# Patient Record
Sex: FEMALE | Race: BLACK OR AFRICAN AMERICAN | NOT HISPANIC OR LATINO | Employment: UNEMPLOYED | URBAN - METROPOLITAN AREA
[De-identification: names, ages, dates, MRNs, and addresses within clinical notes are randomized per-mention and may not be internally consistent; named-entity substitution may affect disease eponyms.]

---

## 2017-01-23 ENCOUNTER — HISTORICAL (OUTPATIENT)
Dept: INTERNAL MEDICINE | Facility: CLINIC | Age: 37
End: 2017-01-23

## 2017-05-12 ENCOUNTER — HISTORICAL (OUTPATIENT)
Dept: OTOLARYNGOLOGY | Facility: CLINIC | Age: 37
End: 2017-05-12

## 2017-06-23 ENCOUNTER — HISTORICAL (OUTPATIENT)
Dept: ADMINISTRATIVE | Facility: HOSPITAL | Age: 37
End: 2017-06-23

## 2017-06-23 LAB
BUN SERPL-MCNC: 8 MG/DL (ref 7–25)
CALCIUM SERPL-MCNC: 8.5 MG/DL (ref 8.4–10.3)
CHLORIDE SERPL-SCNC: 105 MMOL/L (ref 96–110)
CO2 SERPL-SCNC: 27 MMOL/L (ref 24–32)
CREAT SERPL-MCNC: 0.51 MG/DL (ref 0.7–1.1)
ERYTHROCYTE [DISTWIDTH] IN BLOOD BY AUTOMATED COUNT: 15.9 % (ref 11.5–14.5)
GLUCOSE SERPL-MCNC: 79 MG/DL (ref 65–99)
HCT VFR BLD AUTO: 35.6 % (ref 35–46)
HGB BLD-MCNC: 11.9 GM/DL (ref 12–16)
MCH RBC QN AUTO: 24.7 PG (ref 26–34)
MCHC RBC AUTO-ENTMCNC: 33.4 GM/DL (ref 31–37)
MCV RBC AUTO: 74 FL (ref 80–100)
PLATELET # BLD AUTO: 281 X10(3)/MCL (ref 130–400)
PMV BLD AUTO: 12.1 FL (ref 7.4–10.4)
POTASSIUM SERPL-SCNC: 3.7 MMOL/L (ref 3.6–5.2)
RBC # BLD AUTO: 4.81 X10(6)/MCL (ref 4–5.2)
SODIUM SERPL-SCNC: 140 MMOL/L (ref 135–146)
WBC # SPEC AUTO: 6.8 X10(3)/MCL (ref 4.5–11)

## 2017-06-30 ENCOUNTER — HISTORICAL (OUTPATIENT)
Dept: SURGERY | Facility: HOSPITAL | Age: 37
End: 2017-06-30

## 2017-06-30 LAB — B-HCG SERPL QL: NEGATIVE

## 2017-07-21 ENCOUNTER — HISTORICAL (OUTPATIENT)
Dept: ADMINISTRATIVE | Facility: HOSPITAL | Age: 37
End: 2017-07-21

## 2017-07-21 LAB
BUN SERPL-MCNC: 8 MG/DL (ref 7–18)
CALCIUM SERPL-MCNC: 8.5 MG/DL (ref 8.5–10.1)
CHLORIDE SERPL-SCNC: 104 MMOL/L (ref 98–107)
CO2 SERPL-SCNC: 28 MMOL/L (ref 21–32)
CREAT SERPL-MCNC: 0.6 MG/DL (ref 0.6–1.3)
ERYTHROCYTE [DISTWIDTH] IN BLOOD BY AUTOMATED COUNT: 16.3 % (ref 11.5–14.5)
GLUCOSE SERPL-MCNC: 79 MG/DL (ref 74–106)
HCT VFR BLD AUTO: 36.7 % (ref 35–46)
HGB BLD-MCNC: 12.5 GM/DL (ref 12–16)
MCH RBC QN AUTO: 25.1 PG (ref 26–34)
MCHC RBC AUTO-ENTMCNC: 34.1 GM/DL (ref 31–37)
MCV RBC AUTO: 73.7 FL (ref 80–100)
PLATELET # BLD AUTO: 268 X10(3)/MCL (ref 130–400)
PMV BLD AUTO: 11.1 FL (ref 7.4–10.4)
POTASSIUM SERPL-SCNC: 3.9 MMOL/L (ref 3.5–5.1)
RBC # BLD AUTO: 4.98 X10(6)/MCL (ref 4–5.2)
SODIUM SERPL-SCNC: 140 MMOL/L (ref 136–145)
WBC # SPEC AUTO: 8.2 X10(3)/MCL (ref 4.5–11)

## 2017-07-24 ENCOUNTER — HISTORICAL (OUTPATIENT)
Dept: SURGERY | Facility: HOSPITAL | Age: 37
End: 2017-07-24

## 2017-07-24 LAB — B-HCG SERPL QL: NEGATIVE

## 2017-08-04 ENCOUNTER — HISTORICAL (OUTPATIENT)
Dept: ADMINISTRATIVE | Facility: HOSPITAL | Age: 37
End: 2017-08-04

## 2017-08-11 ENCOUNTER — HOSPITAL ENCOUNTER (OUTPATIENT)
Dept: MEDSURG UNIT | Facility: HOSPITAL | Age: 37
End: 2017-08-12
Attending: OTOLARYNGOLOGY | Admitting: OTOLARYNGOLOGY

## 2017-08-11 LAB
B-HCG SERPL QL: NEGATIVE
POTASSIUM SERPL-SCNC: 3.7 MMOL/L (ref 3.5–5.1)

## 2018-09-01 ENCOUNTER — HISTORICAL (OUTPATIENT)
Dept: ADMINISTRATIVE | Facility: HOSPITAL | Age: 38
End: 2018-09-01

## 2018-09-01 LAB
ABS NEUT (OLG): 5.17 X10(3)/MCL (ref 2.1–9.2)
BASOPHILS # BLD AUTO: 0.03 X10(3)/MCL
BASOPHILS NFR BLD AUTO: 0 %
BUN SERPL-MCNC: 6 MG/DL (ref 7–18)
CALCIUM SERPL-MCNC: 7.7 MG/DL (ref 8.5–10.1)
CHLORIDE SERPL-SCNC: 106 MMOL/L (ref 98–107)
CO2 SERPL-SCNC: 28 MMOL/L (ref 21–32)
CREAT SERPL-MCNC: 0.6 MG/DL (ref 0.6–1.3)
CREAT/UREA NIT SERPL: 10
EOSINOPHIL # BLD AUTO: 0.03 X10(3)/MCL
EOSINOPHIL NFR BLD AUTO: 0 %
ERYTHROCYTE [DISTWIDTH] IN BLOOD BY AUTOMATED COUNT: 16.3 % (ref 11.5–14.5)
GLUCOSE SERPL-MCNC: 87 MG/DL (ref 74–106)
HCT VFR BLD AUTO: 30.6 % (ref 35–46)
HGB BLD-MCNC: 9.9 GM/DL (ref 12–16)
IMM GRANULOCYTES # BLD AUTO: 0.02 10*3/UL
IMM GRANULOCYTES NFR BLD AUTO: 0 %
LYMPHOCYTES # BLD AUTO: 1.8 X10(3)/MCL
LYMPHOCYTES NFR BLD AUTO: 23 % (ref 13–40)
MCH RBC QN AUTO: 22.6 PG (ref 26–34)
MCHC RBC AUTO-ENTMCNC: 32.4 GM/DL (ref 31–37)
MCV RBC AUTO: 69.7 FL (ref 80–100)
MONOCYTES # BLD AUTO: 0.66 X10(3)/MCL
MONOCYTES NFR BLD AUTO: 9 % (ref 4–12)
NEUTROPHILS # BLD AUTO: 5.17 X10(3)/MCL
NEUTROPHILS NFR BLD AUTO: 67 X10(3)/MCL
PLATELET # BLD AUTO: 316 X10(3)/MCL (ref 130–400)
PMV BLD AUTO: 10.1 FL (ref 7.4–10.4)
POTASSIUM SERPL-SCNC: 3.9 MMOL/L (ref 3.5–5.1)
RBC # BLD AUTO: 4.39 X10(6)/MCL (ref 4–5.2)
SODIUM SERPL-SCNC: 140 MMOL/L (ref 136–145)
T3FREE SERPL-MCNC: 2.51 PG/ML (ref 2.18–3.98)
T4 FREE SERPL-MCNC: 1.07 NG/DL (ref 0.76–1.46)
TSH SERPL-ACNC: 0.71 MIU/L (ref 0.36–3.74)
WBC # SPEC AUTO: 7.7 X10(3)/MCL (ref 4.5–11)

## 2018-10-01 ENCOUNTER — HISTORICAL (OUTPATIENT)
Dept: RADIOLOGY | Facility: HOSPITAL | Age: 38
End: 2018-10-01

## 2018-10-25 ENCOUNTER — HISTORICAL (OUTPATIENT)
Dept: RADIOLOGY | Facility: HOSPITAL | Age: 38
End: 2018-10-25

## 2018-10-25 LAB
ABS NEUT (OLG): 5.19 X10(3)/MCL (ref 2.1–9.2)
ALBUMIN SERPL-MCNC: 3.3 GM/DL (ref 3.4–5)
ALBUMIN/GLOB SERPL: 0.7 RATIO (ref 1.1–2)
ALP SERPL-CCNC: 64 UNIT/L (ref 46–116)
ALT SERPL-CCNC: 14 UNIT/L (ref 12–78)
ANISOCYTOSIS BLD QL SMEAR: NORMAL
AST SERPL-CCNC: 11 UNIT/L (ref 15–37)
BASOPHILS # BLD AUTO: 0 X10(3)/MCL (ref 0–0.2)
BASOPHILS NFR BLD AUTO: 0 %
BILIRUB SERPL-MCNC: 0.3 MG/DL (ref 0.2–1)
BILIRUBIN DIRECT+TOT PNL SERPL-MCNC: 0.1 MG/DL (ref 0–0.2)
BILIRUBIN DIRECT+TOT PNL SERPL-MCNC: 0.2 MG/DL (ref 0–0.8)
BUN SERPL-MCNC: 9.5 MG/DL (ref 7–18)
CALCIUM SERPL-MCNC: 8.4 MG/DL (ref 8.5–10.1)
CHLORIDE SERPL-SCNC: 105 MMOL/L (ref 98–107)
CO2 SERPL-SCNC: 25.7 MMOL/L (ref 21–32)
CREAT SERPL-MCNC: 0.75 MG/DL (ref 0.6–1.3)
EOSINOPHIL # BLD AUTO: 0.1 X10(3)/MCL (ref 0–0.9)
EOSINOPHIL NFR BLD AUTO: 1 %
ERYTHROCYTE [DISTWIDTH] IN BLOOD BY AUTOMATED COUNT: 17.9 % (ref 11.5–17)
FERRITIN SERPL-MCNC: 7 NG/ML (ref 8–388)
GLOBULIN SER-MCNC: 4.6 GM/DL (ref 2.4–3.5)
GLUCOSE SERPL-MCNC: 99 MG/DL (ref 74–106)
HCT VFR BLD AUTO: 34.7 % (ref 37–47)
HGB BLD-MCNC: 10.9 GM/DL (ref 12–16)
IMM GRANULOCYTES # BLD AUTO: 0.02 % (ref 0–0.02)
IMM GRANULOCYTES NFR BLD AUTO: 0.2 % (ref 0–0.43)
IRON SATN MFR SERPL: 6.8 % (ref 20–50)
IRON SERPL-MCNC: 29 MCG/DL (ref 50–175)
LYMPHOCYTES # BLD AUTO: 2 X10(3)/MCL (ref 0.6–4.6)
LYMPHOCYTES NFR BLD AUTO: 24 %
MCH RBC QN AUTO: 21.8 PG (ref 27–31)
MCHC RBC AUTO-ENTMCNC: 31.4 GM/DL (ref 33–36)
MCV RBC AUTO: 69.5 FL (ref 80–94)
MICROCYTES BLD QL SMEAR: NORMAL
MONOCYTES # BLD AUTO: 1 X10(3)/MCL (ref 0.1–1.3)
MONOCYTES NFR BLD AUTO: 12 %
NEUTROPHILS # BLD AUTO: 5.19 X10(3)/MCL (ref 1.4–7.9)
NEUTROPHILS NFR BLD AUTO: 62 %
PLATELET # BLD AUTO: 304 X10(3)/MCL (ref 130–400)
PLATELET # BLD EST: ADEQUATE 10*3/UL
PMV BLD AUTO: 9.6 FL (ref 9.4–12.4)
POTASSIUM SERPL-SCNC: 4.4 MMOL/L (ref 3.5–5.1)
PROT SERPL-MCNC: 7.9 GM/DL (ref 6.4–8.2)
RBC # BLD AUTO: 4.99 X10(6)/MCL (ref 4.2–5.4)
SODIUM SERPL-SCNC: 139 MMOL/L (ref 136–145)
TIBC SERPL-MCNC: 429 MCG/DL (ref 250–450)
TRANSFERRIN SERPL-MCNC: 317 MG/DL (ref 200–360)
WBC # SPEC AUTO: 8.4 X10(3)/MCL (ref 4.5–11.5)

## 2019-03-07 ENCOUNTER — HISTORICAL (OUTPATIENT)
Dept: ADMINISTRATIVE | Facility: HOSPITAL | Age: 39
End: 2019-03-07

## 2019-03-07 LAB
ALBUMIN SERPL-MCNC: 3.8 GM/DL (ref 3.4–5)
ALBUMIN/GLOB SERPL: 0.8 RATIO (ref 1.1–2)
ALP SERPL-CCNC: 83 UNIT/L (ref 45–117)
ALT SERPL-CCNC: 18 UNIT/L (ref 12–78)
ANISOCYTOSIS BLD QL SMEAR: ABNORMAL
AST SERPL-CCNC: 18 UNIT/L (ref 15–37)
BASOPHILS NFR BLD MANUAL: 0 %
BILIRUB SERPL-MCNC: 0.4 MG/DL (ref 0.2–1)
BILIRUBIN DIRECT+TOT PNL SERPL-MCNC: 0.1 MG/DL
BILIRUBIN DIRECT+TOT PNL SERPL-MCNC: 0.3 MG/DL
BUN SERPL-MCNC: 8 MG/DL (ref 7–18)
CALCIUM SERPL-MCNC: 8.6 MG/DL (ref 8.5–10.1)
CHLORIDE SERPL-SCNC: 109 MMOL/L (ref 98–107)
CO2 SERPL-SCNC: 22 MMOL/L (ref 21–32)
CREAT SERPL-MCNC: 0.9 MG/DL (ref 0.6–1.3)
DEPRECATED CALCIDIOL+CALCIFEROL SERPL-MC: 9.17 NG/ML (ref 30–80)
EOSINOPHIL NFR BLD MANUAL: 0 %
ERYTHROCYTE [DISTWIDTH] IN BLOOD BY AUTOMATED COUNT: 18.9 % (ref 11.5–14.5)
FERRITIN SERPL-MCNC: 9.4 NG/ML (ref 10–150)
GLOBULIN SER-MCNC: 4.9 GM/ML (ref 2.3–3.5)
GLUCOSE SERPL-MCNC: 80 MG/DL (ref 74–106)
GRANULOCYTES NFR BLD MANUAL: 69 % (ref 43–75)
HCT VFR BLD AUTO: 36.6 % (ref 35–46)
HGB BLD-MCNC: 11.3 GM/DL (ref 12–16)
IRON SATN MFR SERPL: 8 % (ref 15–50)
IRON SERPL-MCNC: 34 MCG/DL (ref 50–170)
LYMPHOCYTES NFR BLD MANUAL: 1 %
LYMPHOCYTES NFR BLD MANUAL: 18 % (ref 20.5–51.1)
MCH RBC QN AUTO: 21.3 PG (ref 26–34)
MCHC RBC AUTO-ENTMCNC: 30.9 GM/DL (ref 31–37)
MCV RBC AUTO: 68.9 FL (ref 80–100)
MICROCYTES BLD QL SMEAR: ABNORMAL
MONOCYTES NFR BLD MANUAL: 11 % (ref 2–9)
NEUTS BAND NFR BLD MANUAL: 1 % (ref 0–10)
PLATELET # BLD AUTO: 300 X10(3)/MCL (ref 130–400)
PLATELET # BLD EST: ADEQUATE 10*3/UL
PMV BLD AUTO: 10.3 FL (ref 7.4–10.4)
POLYCHROMASIA BLD QL SMEAR: ABNORMAL
POTASSIUM SERPL-SCNC: 3.6 MMOL/L (ref 3.5–5.1)
PROT SERPL-MCNC: 8.7 GM/DL (ref 6.4–8.2)
RBC # BLD AUTO: 5.31 X10(6)/MCL (ref 4–5.2)
RBC MORPH BLD: ABNORMAL
SODIUM SERPL-SCNC: 136 MMOL/L (ref 136–145)
SPHEROCYTES BLD QL SMEAR: ABNORMAL
TIBC SERPL-MCNC: 426 MCG/DL (ref 250–450)
TRANSFERRIN SERPL-MCNC: 331 MG/DL (ref 200–360)
WBC # SPEC AUTO: 5.8 X10(3)/MCL (ref 4.5–11)

## 2019-03-15 ENCOUNTER — HISTORICAL (OUTPATIENT)
Dept: RADIOLOGY | Facility: HOSPITAL | Age: 39
End: 2019-03-15

## 2019-04-15 ENCOUNTER — HISTORICAL (OUTPATIENT)
Dept: ENDOSCOPY | Facility: HOSPITAL | Age: 39
End: 2019-04-15

## 2019-04-15 LAB — B-HCG SERPL QL: NEGATIVE

## 2020-03-13 ENCOUNTER — HISTORICAL (OUTPATIENT)
Dept: RADIOLOGY | Facility: HOSPITAL | Age: 40
End: 2020-03-13

## 2020-09-21 ENCOUNTER — HISTORICAL (OUTPATIENT)
Dept: LAB | Facility: HOSPITAL | Age: 40
End: 2020-09-21

## 2020-09-21 LAB
ABS NEUT (OLG): 6.64 X10(3)/MCL (ref 2.1–9.2)
ALBUMIN SERPL-MCNC: 3.5 GM/DL (ref 3.4–5)
ALBUMIN/GLOB SERPL: 0.7 RATIO (ref 1.1–2)
ALP SERPL-CCNC: 68 UNIT/L (ref 46–116)
ALT SERPL-CCNC: 19 UNIT/L (ref 12–78)
AST SERPL-CCNC: 12 UNIT/L (ref 15–37)
BASOPHILS # BLD AUTO: 0 X10(3)/MCL (ref 0–0.2)
BASOPHILS NFR BLD AUTO: 0 %
BILIRUB SERPL-MCNC: 0.3 MG/DL (ref 0.2–1)
BILIRUBIN DIRECT+TOT PNL SERPL-MCNC: 0.1 MG/DL (ref 0–0.2)
BILIRUBIN DIRECT+TOT PNL SERPL-MCNC: 0.2 MG/DL (ref 0–0.8)
BUN SERPL-MCNC: 12.2 MG/DL (ref 7–18)
CALCIUM SERPL-MCNC: 9.1 MG/DL (ref 8.5–10.1)
CHLORIDE SERPL-SCNC: 104 MMOL/L (ref 98–107)
CHOLEST SERPL-MCNC: 199 MG/DL (ref 0–200)
CHOLEST/HDLC SERPL: 4.1 {RATIO} (ref 0–4)
CO2 SERPL-SCNC: 25.8 MMOL/L (ref 21–32)
CREAT SERPL-MCNC: 0.8 MG/DL (ref 0.6–1.3)
DEPRECATED CALCIDIOL+CALCIFEROL SERPL-MC: 25.1 NG/ML (ref 6.6–49.9)
EOSINOPHIL # BLD AUTO: 0 X10(3)/MCL (ref 0–0.9)
EOSINOPHIL NFR BLD AUTO: 0 %
ERYTHROCYTE [DISTWIDTH] IN BLOOD BY AUTOMATED COUNT: 17.5 % (ref 11.5–17)
GLOBULIN SER-MCNC: 4.8 GM/DL (ref 2.4–3.5)
GLUCOSE SERPL-MCNC: 99 MG/DL (ref 74–106)
HCT VFR BLD AUTO: 31.6 % (ref 37–47)
HDLC SERPL-MCNC: 49 MG/DL (ref 40–60)
HGB BLD-MCNC: 10.3 GM/DL (ref 12–16)
LDLC SERPL CALC-MCNC: 124 MG/DL (ref 0–129)
LYMPHOCYTES # BLD AUTO: 1.9 X10(3)/MCL (ref 0.6–4.6)
LYMPHOCYTES NFR BLD AUTO: 20 %
MCH RBC QN AUTO: 22.4 PG (ref 27–31)
MCHC RBC AUTO-ENTMCNC: 32.6 GM/DL (ref 33–36)
MCV RBC AUTO: 68.7 FL (ref 80–94)
MONOCYTES # BLD AUTO: 0.9 X10(3)/MCL (ref 0.1–1.3)
MONOCYTES NFR BLD AUTO: 9 %
NEUTROPHILS # BLD AUTO: 6.64 X10(3)/MCL (ref 1.4–7.9)
NEUTROPHILS NFR BLD AUTO: 70 %
PLATELET # BLD AUTO: 335 X10(3)/MCL (ref 130–400)
PMV BLD AUTO: 10.1 FL (ref 9.4–12.4)
POTASSIUM SERPL-SCNC: 4 MMOL/L (ref 3.5–5.1)
PROT SERPL-MCNC: 8.3 GM/DL (ref 6.4–8.2)
RBC # BLD AUTO: 4.6 X10(6)/MCL (ref 4.2–5.4)
SODIUM SERPL-SCNC: 141 MMOL/L (ref 136–145)
TRIGL SERPL-MCNC: 129 MG/DL
TSH SERPL-ACNC: 1.43 MIU/ML (ref 0.36–3.74)
VLDLC SERPL CALC-MCNC: 26 MG/DL
WBC # SPEC AUTO: 9.5 X10(3)/MCL (ref 4.5–11.5)

## 2020-10-30 ENCOUNTER — HISTORICAL (OUTPATIENT)
Dept: RADIOLOGY | Facility: HOSPITAL | Age: 40
End: 2020-10-30

## 2021-03-24 ENCOUNTER — HISTORICAL (OUTPATIENT)
Dept: ADMINISTRATIVE | Facility: HOSPITAL | Age: 41
End: 2021-03-24

## 2021-03-24 LAB
ABS NEUT (OLG): 6.44 X10(3)/MCL (ref 2.1–9.2)
APPEARANCE, UA: CLEAR
BACTERIA #/AREA URNS AUTO: ABNORMAL /HPF
BASOPHILS # BLD AUTO: 0 X10(3)/MCL (ref 0–0.2)
BASOPHILS NFR BLD AUTO: 0 %
BILIRUB UR QL STRIP: NEGATIVE
COLOR UR: NORMAL
EOSINOPHIL # BLD AUTO: 0 X10(3)/MCL (ref 0–0.9)
EOSINOPHIL NFR BLD AUTO: 0 %
ERYTHROCYTE [DISTWIDTH] IN BLOOD BY AUTOMATED COUNT: 17.3 % (ref 11.5–14.5)
GLUCOSE (UA): NEGATIVE
HAV IGM SERPL QL IA: NONREACTIVE
HBV CORE IGM SERPL QL IA: NONREACTIVE
HBV SURFACE AG SERPL QL IA: NONREACTIVE
HCT VFR BLD AUTO: 35.1 % (ref 35–46)
HCV AB SERPL QL IA: NONREACTIVE
HGB BLD-MCNC: 10.7 GM/DL (ref 12–16)
HGB UR QL STRIP: NEGATIVE
HIV 1+2 AB+HIV1 P24 AG SERPL QL IA: NONREACTIVE
HYALINE CASTS #/AREA URNS LPF: ABNORMAL /LPF
IMM GRANULOCYTES # BLD AUTO: 0.02 10*3/UL
IMM GRANULOCYTES NFR BLD AUTO: 0 %
KETONES UR QL STRIP: NEGATIVE
LEUKOCYTE ESTERASE UR QL STRIP: NEGATIVE
LYMPHOCYTES # BLD AUTO: 2.6 X10(3)/MCL (ref 0.6–4.6)
LYMPHOCYTES NFR BLD AUTO: 26 %
MCH RBC QN AUTO: 20.7 PG (ref 26–34)
MCHC RBC AUTO-ENTMCNC: 30.5 GM/DL (ref 31–37)
MCV RBC AUTO: 68 FL (ref 80–100)
MONOCYTES # BLD AUTO: 0.8 X10(3)/MCL (ref 0.1–1.3)
MONOCYTES NFR BLD AUTO: 8 %
NEUTROPHILS # BLD AUTO: 6.44 X10(3)/MCL (ref 2.1–9.2)
NEUTROPHILS NFR BLD AUTO: 65 %
NITRITE UR QL STRIP: NEGATIVE
PH UR STRIP: 7 [PH] (ref 4.5–8)
PLATELET # BLD AUTO: 389 X10(3)/MCL (ref 130–400)
PMV BLD AUTO: 10.2 FL (ref 7.4–10.4)
POC BETA-HCG (QUAL): NEGATIVE
PROT UR QL STRIP: NEGATIVE
RBC # BLD AUTO: 5.16 X10(6)/MCL (ref 4–5.2)
RBC #/AREA URNS AUTO: ABNORMAL /HPF
SP GR UR STRIP: 1.01 (ref 1–1.03)
SQUAMOUS #/AREA URNS LPF: ABNORMAL /LPF
T PALLIDUM AB SER QL: NONREACTIVE
UROBILINOGEN UR STRIP-ACNC: NORMAL
WBC # SPEC AUTO: 9.8 X10(3)/MCL (ref 4.5–11)
WBC #/AREA URNS AUTO: ABNORMAL /HPF

## 2021-04-22 ENCOUNTER — HISTORICAL (OUTPATIENT)
Dept: RADIOLOGY | Facility: HOSPITAL | Age: 41
End: 2021-04-22

## 2021-06-08 ENCOUNTER — HISTORICAL (OUTPATIENT)
Dept: RADIOLOGY | Facility: HOSPITAL | Age: 41
End: 2021-06-08

## 2021-07-16 ENCOUNTER — HISTORICAL (OUTPATIENT)
Dept: ADMINISTRATIVE | Facility: HOSPITAL | Age: 41
End: 2021-07-16

## 2021-07-22 ENCOUNTER — HISTORICAL (OUTPATIENT)
Dept: SURGERY | Facility: HOSPITAL | Age: 41
End: 2021-07-22

## 2021-08-18 ENCOUNTER — HISTORICAL (OUTPATIENT)
Dept: RADIOLOGY | Facility: HOSPITAL | Age: 41
End: 2021-08-18

## 2021-09-08 ENCOUNTER — HISTORICAL (OUTPATIENT)
Dept: LAB | Facility: HOSPITAL | Age: 41
End: 2021-09-08

## 2021-09-08 LAB
ABS NEUT (OLG): 1.88 X10(3)/MCL (ref 2.1–9.2)
ALBUMIN SERPL-MCNC: 3.7 GM/DL (ref 3.5–5)
ALBUMIN/GLOB SERPL: 0.8 RATIO (ref 1.1–2)
ALP SERPL-CCNC: 75 UNIT/L (ref 40–150)
ALT SERPL-CCNC: 15 UNIT/L (ref 0–55)
AST SERPL-CCNC: 18 UNIT/L (ref 5–34)
BASOPHILS # BLD AUTO: 0 X10(3)/MCL (ref 0–0.2)
BASOPHILS NFR BLD AUTO: 0 %
BILIRUB SERPL-MCNC: 0.4 MG/DL
BILIRUBIN DIRECT+TOT PNL SERPL-MCNC: 0.2 MG/DL (ref 0–0.5)
BILIRUBIN DIRECT+TOT PNL SERPL-MCNC: 0.2 MG/DL (ref 0–0.8)
BUN SERPL-MCNC: 10.4 MG/DL (ref 7–18.7)
CALCIUM SERPL-MCNC: 9 MG/DL (ref 8.4–10.2)
CHLORIDE SERPL-SCNC: 104 MMOL/L (ref 98–107)
CHOLEST SERPL-MCNC: 132 MG/DL
CHOLEST/HDLC SERPL: 4 {RATIO} (ref 0–5)
CO2 SERPL-SCNC: 25 MMOL/L (ref 22–29)
CREAT SERPL-MCNC: 0.74 MG/DL (ref 0.55–1.02)
DEPRECATED CALCIDIOL+CALCIFEROL SERPL-MC: 27.2 NG/ML (ref 30–80)
ERYTHROCYTE [DISTWIDTH] IN BLOOD BY AUTOMATED COUNT: 16.2 % (ref 11.5–14.5)
EST. AVERAGE GLUCOSE BLD GHB EST-MCNC: 119.8 MG/DL
GLOBULIN SER-MCNC: 4.7 GM/DL (ref 2.4–3.5)
GLUCOSE SERPL-MCNC: 90 MG/DL (ref 74–100)
HBA1C MFR BLD: 5.8 %
HCT VFR BLD AUTO: 35.8 % (ref 35–46)
HDLC SERPL-MCNC: 36 MG/DL (ref 35–60)
HGB BLD-MCNC: 11.2 GM/DL (ref 12–16)
IGA SERPL-MCNC: 477 MG/DL (ref 65–421)
IGG SERPL-MCNC: 1910 MG/DL (ref 552–1632)
IGM SERPL-MCNC: 187 MG/DL (ref 33–293)
IMM GRANULOCYTES # BLD AUTO: 0.01 10*3/UL
IMM GRANULOCYTES NFR BLD AUTO: 0 %
LDLC SERPL CALC-MCNC: 79 MG/DL (ref 50–140)
LYMPHOCYTES # BLD AUTO: 1.2 X10(3)/MCL (ref 0.6–4.6)
LYMPHOCYTES NFR BLD AUTO: 30 %
MCH RBC QN AUTO: 21.3 PG (ref 26–34)
MCHC RBC AUTO-ENTMCNC: 31.3 GM/DL (ref 31–37)
MCV RBC AUTO: 68.1 FL (ref 80–100)
MONOCYTES # BLD AUTO: 0.9 X10(3)/MCL (ref 0.1–1.3)
MONOCYTES NFR BLD AUTO: 22 %
NEUTROPHILS # BLD AUTO: 1.88 X10(3)/MCL (ref 2.1–9.2)
NEUTROPHILS NFR BLD AUTO: 48 %
NRBC BLD AUTO-RTO: 0 % (ref 0–0.2)
PLATELET # BLD AUTO: 314 X10(3)/MCL (ref 130–400)
PMV BLD AUTO: 10.7 FL (ref 7.4–10.4)
POTASSIUM SERPL-SCNC: 3 MMOL/L (ref 3.5–5.1)
PROT SERPL-MCNC: 8.4 GM/DL (ref 6.4–8.3)
RBC # BLD AUTO: 5.26 X10(6)/MCL (ref 4–5.2)
SODIUM SERPL-SCNC: 138 MMOL/L (ref 136–145)
TRIGL SERPL-MCNC: 86 MG/DL (ref 37–140)
VLDLC SERPL CALC-MCNC: 17 MG/DL
WBC # SPEC AUTO: 3.9 X10(3)/MCL (ref 4.5–11)

## 2021-10-06 ENCOUNTER — HISTORICAL (OUTPATIENT)
Dept: ADMINISTRATIVE | Facility: HOSPITAL | Age: 41
End: 2021-10-06

## 2021-10-06 LAB
APPEARANCE, UA: CLEAR
BACTERIA #/AREA URNS AUTO: ABNORMAL /HPF
BILIRUB UR QL STRIP: NEGATIVE
COLOR UR: YELLOW
GLUCOSE (UA): NEGATIVE
HGB UR QL STRIP: NEGATIVE
HYALINE CASTS #/AREA URNS LPF: ABNORMAL /LPF
KETONES UR QL STRIP: NEGATIVE
LEUKOCYTE ESTERASE UR QL STRIP: NEGATIVE
NITRITE UR QL STRIP: NEGATIVE
PH UR STRIP: 6.5 [PH] (ref 4.5–8)
PROT UR QL STRIP: 30 MG/DL
RBC #/AREA URNS AUTO: ABNORMAL /HPF
SP GR UR STRIP: 1.02 (ref 1–1.03)
SQUAMOUS #/AREA URNS LPF: >100 /LPF
UROBILINOGEN UR STRIP-ACNC: NORMAL
WBC #/AREA URNS AUTO: ABNORMAL /HPF

## 2021-10-09 LAB — FINAL CULTURE: NORMAL

## 2021-10-11 ENCOUNTER — HISTORICAL (OUTPATIENT)
Dept: ADMINISTRATIVE | Facility: HOSPITAL | Age: 41
End: 2021-10-11

## 2021-10-18 ENCOUNTER — HISTORICAL (OUTPATIENT)
Dept: ADMINISTRATIVE | Facility: HOSPITAL | Age: 41
End: 2021-10-18

## 2021-10-18 LAB — SARS-COV-2 AG RESP QL IA.RAPID: NEGATIVE

## 2021-10-19 ENCOUNTER — HOSPITAL ENCOUNTER (OUTPATIENT)
Dept: MEDSURG UNIT | Facility: HOSPITAL | Age: 41
End: 2021-10-20
Attending: OBSTETRICS & GYNECOLOGY | Admitting: OBSTETRICS & GYNECOLOGY

## 2021-10-20 LAB
ABS NEUT (OLG): 13.19 X10(3)/MCL (ref 2.1–9.2)
BASOPHILS # BLD AUTO: 0 X10(3)/MCL (ref 0–0.2)
BASOPHILS NFR BLD AUTO: 0 %
BUN SERPL-MCNC: 7.1 MG/DL (ref 7–18.7)
CALCIUM SERPL-MCNC: 8.6 MG/DL (ref 8.4–10.2)
CHLORIDE SERPL-SCNC: 105 MMOL/L (ref 98–107)
CO2 SERPL-SCNC: 27 MMOL/L (ref 22–29)
CREAT SERPL-MCNC: 0.69 MG/DL (ref 0.55–1.02)
CREAT/UREA NIT SERPL: 10
ERYTHROCYTE [DISTWIDTH] IN BLOOD BY AUTOMATED COUNT: 20.3 % (ref 11.5–14.5)
GLUCOSE SERPL-MCNC: 104 MG/DL (ref 74–100)
HCT VFR BLD AUTO: 31.3 % (ref 35–46)
HGB BLD-MCNC: 9.7 GM/DL (ref 12–16)
IMM GRANULOCYTES # BLD AUTO: 0.07 10*3/UL
IMM GRANULOCYTES NFR BLD AUTO: 0 %
LYMPHOCYTES # BLD AUTO: 1.8 X10(3)/MCL (ref 0.6–4.6)
LYMPHOCYTES NFR BLD AUTO: 11 %
MCH RBC QN AUTO: 21.7 PG (ref 26–34)
MCHC RBC AUTO-ENTMCNC: 31 GM/DL (ref 31–37)
MCV RBC AUTO: 70 FL (ref 80–100)
MONOCYTES # BLD AUTO: 1.2 X10(3)/MCL (ref 0.1–1.3)
MONOCYTES NFR BLD AUTO: 7 %
NEUTROPHILS # BLD AUTO: 13.19 X10(3)/MCL (ref 2.1–9.2)
NEUTROPHILS NFR BLD AUTO: 81 %
NRBC BLD AUTO-RTO: 0 % (ref 0–0.2)
PLATELET # BLD AUTO: 324 X10(3)/MCL (ref 130–400)
PMV BLD AUTO: 10.5 FL (ref 7.4–10.4)
POTASSIUM SERPL-SCNC: 3.8 MMOL/L (ref 3.5–5.1)
RBC # BLD AUTO: 4.47 X10(6)/MCL (ref 4–5.2)
SODIUM SERPL-SCNC: 139 MMOL/L (ref 136–145)
WBC # SPEC AUTO: 16.2 X10(3)/MCL (ref 4.5–11)

## 2022-02-28 ENCOUNTER — HISTORICAL (OUTPATIENT)
Dept: INTERNAL MEDICINE | Facility: CLINIC | Age: 42
End: 2022-02-28

## 2022-02-28 LAB
ABS NEUT (OLG): 5.38 (ref 2.1–9.2)
ALBUMIN SERPL-MCNC: 3.3 G/DL (ref 3.5–5)
ALBUMIN/GLOB SERPL: 0.8 {RATIO} (ref 1.1–2)
ALP SERPL-CCNC: 57 U/L (ref 40–150)
ALT SERPL-CCNC: 12 U/L (ref 0–55)
AST SERPL-CCNC: 16 U/L (ref 5–34)
BASOPHILS # BLD AUTO: 0 10*3/UL (ref 0–0.2)
BASOPHILS NFR BLD AUTO: 0 %
BILIRUB SERPL-MCNC: 0.4 MG/DL
BILIRUBIN DIRECT+TOT PNL SERPL-MCNC: 0.1 (ref 0–0.5)
BILIRUBIN DIRECT+TOT PNL SERPL-MCNC: 0.3 (ref 0–0.8)
BUN SERPL-MCNC: 6.5 MG/DL (ref 7–18.7)
CALCIUM SERPL-MCNC: 8.3 MG/DL (ref 8.7–10.5)
CHLORIDE SERPL-SCNC: 102 MMOL/L (ref 98–107)
CO2 SERPL-SCNC: 31 MMOL/L (ref 22–29)
CREAT SERPL-MCNC: 0.68 MG/DL (ref 0.55–1.02)
DEPRECATED CALCIDIOL+CALCIFEROL SERPL-MC: 28 NG/ML (ref 30–80)
EOSINOPHIL # BLD AUTO: 0.1 10*3/UL (ref 0–0.9)
EOSINOPHIL NFR BLD AUTO: 1 %
ERYTHROCYTE [DISTWIDTH] IN BLOOD BY AUTOMATED COUNT: 17.9 % (ref 11.5–14.5)
FLAG2 (OHS): 60
FLAG3 (OHS): 80
FLAGS (OHS): 80
GLOBULIN SER-MCNC: 4.2 G/DL (ref 2.4–3.5)
GLUCOSE SERPL-MCNC: 91 MG/DL (ref 74–100)
HCT VFR BLD AUTO: 37.9 % (ref 35–46)
HGB BLD-MCNC: 12.2 G/DL (ref 12–16)
ICTERIC INTERF INDEX SERPL-ACNC: 0
IMM GRANULOCYTES # BLD AUTO: 0.02 10*3/UL
IMM GRANULOCYTES NFR BLD AUTO: 0 %
LIPEMIC INTERF INDEX SERPL-ACNC: 2
LOW EVENT # SUSPECT FLAG (OHS): 90
LYMPHOCYTES # BLD AUTO: 1.7 10*3/UL (ref 0.6–4.6)
LYMPHOCYTES NFR BLD AUTO: 21 %
MANUAL DIFF? (OHS): NO
MCH RBC QN AUTO: 24 PG (ref 26–34)
MCHC RBC AUTO-ENTMCNC: 32.2 G/DL (ref 31–37)
MCV RBC AUTO: 74.6 FL (ref 80–100)
MO BLASTS SUSPECT FLAG (OHS): 40
MONOCYTES # BLD AUTO: 0.9 10*3/UL (ref 0.1–1.3)
MONOCYTES NFR BLD AUTO: 11 %
NEUTROPHILS # BLD AUTO: 5.38 10*3/UL (ref 2.1–9.2)
NEUTROPHILS NFR BLD AUTO: 67 %
NRBC BLD AUTO-RTO: 0 % (ref 0–0.2)
PLATELET # BLD AUTO: 277 10*3/UL (ref 130–400)
PLATELET CLUMPS SUSPECT FLAG (OHS): 50
PMV BLD AUTO: 11.1 FL (ref 7.4–10.4)
POTASSIUM SERPL-SCNC: 3.4 MMOL/L (ref 3.5–5.1)
PROT SERPL-MCNC: 7.5 G/DL (ref 6.4–8.3)
RBC # BLD AUTO: 5.08 10*6/UL (ref 4–5.2)
SODIUM SERPL-SCNC: 138 MMOL/L (ref 136–145)
WBC # SPEC AUTO: 8.1 10*3/UL (ref 4.5–11)
ZZGIANT PLATELETS (OHS): 20

## 2022-04-11 ENCOUNTER — HISTORICAL (OUTPATIENT)
Dept: ADMINISTRATIVE | Facility: HOSPITAL | Age: 42
End: 2022-04-11
Payer: MEDICAID

## 2022-04-24 VITALS
DIASTOLIC BLOOD PRESSURE: 90 MMHG | WEIGHT: 293 LBS | SYSTOLIC BLOOD PRESSURE: 139 MMHG | HEIGHT: 67 IN | BODY MASS INDEX: 45.99 KG/M2 | OXYGEN SATURATION: 98 %

## 2022-05-04 NOTE — HISTORICAL [facility] CERNER
This is a historical note converted from [employer]. Formatting and pictures may have been removed.  Please reference [employer] for original formatting and attached multimedia. History of Present Illness  36 year old female referred for allergic rhinitis. She states that her main symptoms are nasal obstruction, post nasal drip, and chronic facial pressure and pain. She has had about 4-5 rounds of antibiotics over the last year, the longest of which was a 3 week course of augmentin. She does get some benefit with antibiotics, but her symptoms quickly return. She denies symptoms of allergy such as sneezing or watery eyes or itchy eyes. She denies facial trauma or prior nasal surgery.  Currently, she is using flonase twice daily and has been doing that for several months without much benefit. [1]  ?   05/30/2017: Continues to endorse nasal obstruction with frontal pain/pressure. No benefit from flonase or augmentin. Uses water and halls drops for PND. [1]  ?  8/11/2017: Here for surgery. PCP has increased BP meds  Review of Systems  Constitutional - Denies  Eye - Denies  HENT - See HPI  Respiratory - Denies  Cardiovascular - Denies  Gastrointestinal - Denies  Genitourinary - Denies  Heme/Lymph ?- Denies  Endocrine ?- Denies  Immunologic ?- Denies  Musculoskeletal ?- Denies  Integumentary ?- Denies  Neurologic ?- Denies  All Other ROS negative [2]  Physical Exam  Constitutional: no acute distress, grooming appropriate, normal habitus,  Communication: voicing normally  Eyes: PERRL, EOMI  Head/face: no scars, lesions, or masses, no sinus tenderness on palpation, salivary glands normal, facial strength normal  Ears: external ears normal, TM intact, no effusion, hearing intact at conversational level  Nose: external nose normal, no septal deviation,?inferior turbinate hypertrophy, no polyp, mass or other mucosal lesions  Oral cavity: MMM, no lesion to lips, FOM,?oral tongue, buccal mucosa?without mass, dentition  intact  Oropharynx: BOT,?tonsillary fossa, hard/soft palate, posterior pharynx?without mass or lesion  Neck: supple, no thyromegaly, no lymphadenopathy  Respiratory: non-labored respirations, no stridor  Cardiovascular: normal peripheral perfusion, normal rate  Abdomen: soft, non-tender, non-distended  Extremities: no gross deformity, no clubbing, cyanosis, or edema  Skin: no lesions or rash to head/neck  Neurologic: CNII-XII intact, alert and oriented x 3  Lymphatic: no cervical lymphadenopathy  Psych: appropriate mood/affect [3]  Assessment/Plan  ?  36 year old F with CRS. Multiple reschedules d/t pre-op hypertension. PCP has increased regimen. Better controlled this AM.  - To OR for CRS with FESS/turbinate today  [4]   Problem List/Past Medical History  Acid reflux  Allergic rhinitis  HTN - Hypertension  Hypertension  Morbid obesity  Historical  No historical problems  Procedure/Surgical History  EMB (2016),  section (), Cholecystectomy (),  section (), cerclage.  Medications  Inpatient  cefazolin 2gm/50ml D5W (Premix), 2 gm, 100 mL, IV Piggyback, On Call  Depo-Provera 150 mg/mL intramuscular suspension, 150 mg, IM, Once  Depo-Provera 150 mg/mL intramuscular suspension, 150 mg, IM, Once  Depo-Provera 150 mg/mL intramuscular suspension, 150 mg, IM, Once  IVF Lactated Ringers LR Infusion 1,000 mL, 1000 mL, IV  Valium 5 mg oral tablet, 10 mg, 2 tab(s), Oral, On Call, PRN  Home  AMOX-CLAV 875-125 MG TABLET, 1 tab(s), Oral, BID,? ?Not taking  AMOXICILLIN 500 MG CAPSULE, 500 mg, 1 cap(s), Oral, TID,? ?Not taking  cetirizine 10 mg oral tablet, 10 mg, 1 tab(s), Oral, Daily, 2 refills  Colace 100 mg oral capsule, 100 mg, 1 cap(s), Oral, BID, PRN, 1 refills  ferrous gluconate 325 mg oral tablet, 325 mg, 1 tab(s), Oral, Daily, 6 refills  Flonase 50 mcg/inh nasal spray, 2 spray(s), Nasal, BID, 2 refills  hydrochlorothiazide 12.5 mg oral tablet, 12.5 mg, 1 tab(s), Oral, Daily, 5  refills  HYDROXYZINE HCL 50MG TABS (WHITE), 50 mg, 1 tab(s), Oral, Daily  lisinopril 20 mg oral tablet, 20 mg, 1 tab(s), Oral, Daily, 5 refills  METHYLPREDNISOLONE 4 MG DOSEPK, Oral,? ?Not taking  PROMETHAZINE DM SYRUP, 5 mL, Oral, QID,? ?Not taking  Allergies  Tylox  Social History  Alcohol - Low Risk  Past  Current, Wine, 1-2 times per month  Employment/School  Employed  Exercise  Exercise duration: 90. Exercise frequency: 1-2 times/week. Self assessment: Poor condition. Exercise type: Aerobics.  Home/Environment  Lives with Children. Living situation: Home/Independent. Alcohol abuse in household: No. Substance abuse in household: No. Smoker in household: No. Injuries/Abuse/Neglect in household: No. Feels unsafe at home: No.  Nutrition/Health  Regular, low sodium, Wants to lose weight: Yes.  Other  Sexual  Substance Abuse - Denies Substance Abuse  Never  Never  Tobacco - Denies Tobacco Use  Never smoker  Never smoker     [1]?H&ELVIS; Warren Downey MD 07/24/2017 06:08 CDT  [2]?MARIE&Warren Manrique MD 07/24/2017 06:08 CDT  [3]?MARIE&Warren Manrique MD 07/24/2017 06:08 CDT  [4]?MARIE&Warren Manrique MD 07/24/2017 06:08 CDT

## 2022-05-04 NOTE — HISTORICAL [facility]
This is a historical note converted from [employer]. Formatting and pictures may have been removed.  Please reference [employer] for original formatting and attached multimedia. Indication for Surgery  Ms. Case is a 37 year old female with recurrent episodes of sinusitis. Despite maximal medical treatment, she was found to have changes consistent with chronic sinusitis on imaging. After discussion of options, she opted to proceed with endoscopic sinus surgery.  Preoperative Diagnosis  1. Chronic sinusitis  2. Nasal obstruction  3. Inferior turbinate hypertrophy  Postoperative Diagnosis  1. Chronic sinusitis  2. Nasal obstruction  3. Inferior turbinate hypertrophy  Operation  1. Endoscopic sinus surgery  2. Bilateral maxillary antrostomy  3. Bilateral total ethmoidectomy  4. Bilateral sphenoidotomy  5. Bilateral inferior turbinate submucous resection and outfracture  Surgeon(s)  Sridevi Mcbride MD  Assistant  Pool Ruiz MD (staff)  Anesthesia  Genera endotracheal anesthesia  Estimated Blood Loss  30 ml  Specimen(s)  Left and right nasal contents  Complications  None  Technique  After appropriate consent was obtained, the patient was taken to the operating room and laid in the supine position. ?General endotracheal anesthesia was performed.?Time out was performed. Epinephrine soaked cottonoid pledgets were placed in bilateral nasal cavities, with a concentration of 1:1,000.?The patient was then prepped and draped in a sterile fashion.??Next, the navigation was set up and found to be functioning. We then proceeded to examine the nasal cavity. She had bilateral inferior turbinate hypertrophy and a high leftward septal deflection that was nonobstructing.?2% Lidocaine with 1:200,000 Epinephrine was injected into the face of the middle turbinate, the axilla and the lateral nasal wall on both sides. We started on the left side. The middle turbinate on the left side was medialized with a freer. Ball-tipped probe was used to  cely the uncinate. The backbiter was used to bisect the uncinate and the uncinate was removed both superiorly and inferiorly with the up-biting cutters and microdebrider.??The ethmoid bulla was then taken down with the microdebrider.?Up-biting forceps and microdebrider were used to take the anterior ethmoids down to the lamina.??The basal lamella of the middle turbinate was entered low and medial using the microdebrider.?Posterior ethmoid partitions were taken down and the superior turbinate was identified. The inferior 2/3 of the superior turbinate was taken down and the sphenoid ostium was identified and confirmed with navigation. The ostium was enlarged with mushroom punch and Kerrison rongeur. Ethmoidectomy was then completed from posterior to anterior, taking down partitions up to the skull base. The axilla was taken down with Kerrison rongeur as well. Maxillary antrostomy was performed by dilating the natural ostia with the ball-tipped probe, and the opening was enlarged with , back-biter, and microdebrider. We then repeated the same on the right side. The backbiter was used to bisect the uncinate and the uncinate was removed both superiorly and inferiorly with the up-biting cutters and microdebrider.??The ethmoid bulla was then taken down with the microdebrider.?Up-biting forceps and microdebrider were used to take the anterior ethmoids down to the lamina.??The basal lamella of the middle turbinate was entered low and medial using the microdebrider.?Posterior ethmoid partitions were taken down and the superior turbinate was identified. The inferior 2/3 of the superior turbinate was taken down and the sphenoid ostium was identified and confirmed with navigation. The ostium was enlarged with mushroom punch and Kerrison rongeur. Ethmoidectomy was then completed from posterior to anterior, taking down partitions up to the skull base. The axilla was taken down with Kerrison rongeur as well.  Maxillary antrostomy was performed by dilating the natural ostia with the ball-tipped probe, and the opening was enlarged with , back-biter, and microdebrider. We next turned our attention to the inferior turbinates. The head of the turbinate was injected with 2% Lidocaine with 1:200,000 Epinephrine. 15 blade was used to puncture the head of the left inferior turbinate. Caudal was used to raise soft tissue from the bone. Microdebrider with the inferior turbinate blade was used to perform a submucous resection. The turbinate was in then outfractured. The same was performed on the right side. Half of a Nasopore was placed into each middle meatus with the middle turbinate medialized. The patient was then turned back to anesthesia, awakened, and transferred to the recovery room in good condition.  was present for connolly portions of the procedure.

## 2022-05-04 NOTE — HISTORICAL [facility] CERN[facility]
This is a historical note converted from [employer]. Formatting and pictures may have been removed.  Please reference [employer] for original formatting and attached multimedia. Chief Complaint  Here for surgery  History of Present Illness  36 year old female referred for allergic rhinitis. She states that her main symptoms are nasal obstruction, post nasal drip, and chronic facial pressure and pain. She has had about 4-5 rounds of antibiotics over the last year, the longest of which was a 3 week course of augmentin. She does get some benefit with antibiotics, but her symptoms quickly return. She denies symptoms of allergy such as sneezing or watery eyes or itchy eyes. She denies facial trauma or prior nasal surgery.  Currently, she is using flonase twice daily and has been doing that for several months without much benefit. [1]  ?   05/30/2017: Continues to endorse nasal obstruction with frontal pain/pressure. No benefit from flonase or augmentin. Uses water and halls drops for PND.  ?   6/30/17: Here today for surgery  Review of Systems  Constitutional - Denies  Eye - Denies  HENT - See HPI  Respiratory - Denies  Cardiovascular - Denies  Gastrointestinal - Denies  Genitourinary - Denies  Heme/Lymph ?- Denies  Endocrine ?- Denies  Immunologic ?- Denies  Musculoskeletal ?- Denies  Integumentary ?- Denies  Neurologic ?- Denies  All Other ROS negative  Physical Exam  Constitutional: no acute distress, grooming appropriate, normal habitus,  Communication: voicing normally  Eyes: PERRL, EOMI  Head/face: no scars, lesions, or masses, no sinus tenderness on palpation, salivary glands normal, facial strength normal  Ears: external ears normal, TM intact, no effusion, hearing intact at conversational level  Nose: external nose normal, no septal deviation,?inferior turbinate hypertrophy, no polyp, mass or other mucosal lesions  Oral cavity: MMM, no lesion to lips, FOM,?oral tongue, buccal mucosa?without mass, dentition  intact  Oropharynx: BOT,?tonsillary fossa, hard/soft palate, posterior pharynx?without mass or lesion  Neck: supple, no thyromegaly, no lymphadenopathy  Respiratory: non-labored respirations, no stridor  Cardiovascular: normal peripheral perfusion, normal rate  Abdomen: soft, non-tender, non-distended  Extremities: no gross deformity, no clubbing, cyanosis, or edema  Skin: no lesions or rash to head/neck  Neurologic: CNII-XII intact, alert and oriented x 3  Lymphatic: no cervical lymphadenopathy  Psych: appropriate mood/affect  Assessment/Plan  36 year old F with CRS.  - To OR for CRS with FESS/turbinate today   Problem List/Past Medical History  Acid reflux  Allergic rhinitis  HTN - Hypertension  Hypertension  Morbid obesity  Historical  No historical problems  Procedure/Surgical History  EMB (2016),  section (), Cholecystectomy (),  section (), cerclage.  Medications  Inpatient  Ancef, 2 gm, IV Piggyback, On Call  Ancef, 2 gm, 50 mL, IV Piggyback, On Call  Depo-Provera 150 mg/mL intramuscular suspension, 150 mg, IM, Once  Depo-Provera 150 mg/mL intramuscular suspension, 150 mg, IM, Once  Depo-Provera 150 mg/mL intramuscular suspension, 150 mg, IM, Once  Valium 5 mg oral tablet, 10 mg, 2 tab(s), Oral, On Call, PRN  Home  cetirizine 10 mg oral tablet, 10 mg, 1 tab(s), Oral, Daily, 2 refills  Colace 100 mg oral capsule, 100 mg, 1 cap(s), Oral, BID, PRN, 1 refills  ferrous gluconate 325 mg oral tablet, 325 mg, 1 tab(s), Oral, Daily, 6 refills  Flonase 50 mcg/inh nasal spray, 2 spray(s), Nasal, BID, 2 refills  hydrOXYzine hydrochloride 50 mg oral tablet, 50 mg, 1 tab(s), Oral, Daily, PRN  lisinopril 10 mg oral tablet, 10 mg, 1 tab(s), Oral, Daily, 6 refills  Allergies  Tylox  Social History  Alcohol - Low Risk  Current, Wine, 1-2 times per month  Employment/School  Employed  Exercise  Exercise type: does not exercise.  Home/Environment  Lives with Children. Injuries/Abuse/Neglect in  household: No. Feels unsafe at home: No.  Nutrition/Health  Regular  Other  Sexual  Substance Abuse - Denies Substance Abuse  Never  Tobacco - Denies Tobacco Use  Never smoker     [1]?Office Visit Note; Yasmine Gaffney MD 05/30/2017 12:57 CDT

## 2022-05-04 NOTE — HISTORICAL [facility]
This is a historical note converted from [employer]. Formatting and pictures may have been removed.  Please reference [employer] for original formatting and attached multimedia. Admission Information  35yo F with a history of CRS presenting for FESS.?Overnight observation?for BP and pain control.  Hospital Course  Patient presented on the day of admission for elective FESS. She underwent her surgery as planned. Post-operatively she was noted to have significant hypertension in same day surgery as well as difficulty with pain control. The patient has previously had difficulty with BP management pre-operatively leading multiple surgery cancellations in the past. As such she was admitted for close management of her BP and pain. IM was consulted for assistance in BP management. On POD1 she was doing well. Her BP was near baseline and her pain was improved. She reported resolution of her headache.  Procedures and Treatment Provided  08/11/2017 - [id number] - Turbinate Reduction  08/11/2017 - [id number] - FESS/Functional Endoscopic Sinus Surgery  Physical Exam  Vitals & Measurements  T:?36.4? ?C ?(Oral)? T:?36.6? ?C ?(Temporal Artery)? TMIN:?36.4? ?C ?(Oral)? TMAX:?36.6? ?C ?(Temporal Artery)? HR:?82?(Monitored)? BP:?142/83? SpO2:?99%?  Constitutional: no acute distress, grooming appropriate, normal habitus,  Communication: voicing normally  Eyes: PERRL, EOMI  Head/face: no scars, lesions, or masses, no sinus tenderness on palpation, salivary glands normal, facial strength normal  Ears: external ears normal, TM intact, no effusion, hearing intact at conversational level  Nose: mustache dressing in place and unsoiled  Oral cavity: MMM, no lesion to lips, FOM,?oral tongue, buccal mucosa?without mass, dentition intact  Oropharynx: BOT,?tonsillary fossa, hard/soft palate, posterior pharynx?without mass or lesion  Neck: supple, soft  Respiratory: non-labored respirations, no stridor  Cardiovascular: normal peripheral perfusion,  normal rate  Abdomen: soft, non-tender, non-distended  Extremities: no gross deformity, no clubbing, cyanosis, or edema  Skin: no lesions or rash to head/neck  Neurologic: CNII-XII intact, alert and oriented x 3  Lymphatic: no cervical lymphadenopathy  Psych: appropriate mood/affect [1]  Discharge Plan  Discharge home  Discharge activity: as tolerated, may shower, no lifting more than 20lbs for 2 weeks, no nose blowing  Discharge diet: regular  Orders:  acetaminophen-HYDROcodone, 2 tab(s), form: Tab, Oral, q4hr PRN for pain, severe, first dose 08/11/17 20:50:00 CDT  acetaminophen-HYDROcodone, 1 tab(s), form: Tab, Oral, q3hr PRN for pain, moderate, first dose 08/11/17 20:49:00 CDT  docusate, 100 mg, form: Cap, Oral, BID PRN for constipation, first dose 08/11/17 14:10:00 CDT, 24,034  ferrous gluconate, 324 mg, form: Tab, Oral, Daily, first dose 08/12/17 9:00:00 CDT, 23  hydrochlorothiazide, 12.5 mg, form: Tab, Oral, Daily, first dose 08/12/17 9:00:00 CDT, 23  sulfamethoxazole-trimethoprim, 1 tab(s), form: Tab, Oral, BID, first dose 08/11/17 21:00:00 CDT  Discharge, Home, when awake and alert, tolerating liquids.  Discharge Activity, Other:, Avoid heavy lifting or strenuous activity. Do not blow nose.  Discharge Diet, Regular  Place in Outpatient Observation, 08/11/17 14:09:00 CDT, Sara KIDD, Sander PORTILLO Post-Op, No  Regular Diet, 08/11/17 17:25:00 CDT, Start Meal: Next Meal  Patient Discharge Condition  stable  Discharge Disposition  home, plan for post-op follow-up on 8/23  [1]?Admission H & P; Scottie KIDD, Farrukh LOPEZ 08/11/2017 05:54 CDT   The patients note was reviewed and I agree with the proposed assessment and plan.

## 2022-05-04 NOTE — HISTORICAL [facility]
This is a historical note converted from [employer]. Formatting and pictures may have been removed.  Please reference [employer] for original formatting and attached multimedia. Chief Complaint  ?  Here for surgery  History of Present Illness  36 year old female referred for allergic rhinitis. She states that her main symptoms are nasal obstruction, post nasal drip, and chronic facial pressure and pain. She has had about 4-5 rounds of antibiotics over the last year, the longest of which was a 3 week course of augmentin. She does get some benefit with antibiotics, but her symptoms quickly return. She denies symptoms of allergy such as sneezing or watery eyes or itchy eyes. She denies facial trauma or prior nasal surgery.  Currently, she is using flonase twice daily and has been doing that for several months without much benefit. [1]  ?  05/30/2017: Continues to endorse nasal obstruction with frontal pain/pressure. No benefit from flonase or augmentin. Uses water and halls drops for PND.  ?  6/30/17: Here today for surgery  Review of Systems  Constitutional - Denies  Eye - Denies  HENT - See HPI  Respiratory - Denies  Cardiovascular - Denies  Gastrointestinal - Denies  Genitourinary - Denies  Heme/Lymph ?- Denies  Endocrine ?- Denies  Immunologic ?- Denies  Musculoskeletal ?- Denies  Integumentary ?- Denies  Neurologic ?- Denies  All Other ROS negative  Physical Exam  Constitutional: no acute distress, grooming appropriate, normal habitus,  Communication: voicing normally  Eyes: PERRL, EOMI  Head/face: no scars, lesions, or masses, no sinus tenderness on palpation, salivary glands normal, facial strength normal  Ears: external ears normal, TM intact, no effusion, hearing intact at conversational level  Nose: external nose normal, no septal deviation,?inferior turbinate hypertrophy, no polyp, mass or other mucosal lesions  Oral cavity: MMM, no lesion to lips, FOM,?oral tongue, buccal mucosa?without mass, dentition  intact  Oropharynx: BOT,?tonsillary fossa, hard/soft palate, posterior pharynx?without mass or lesion  Neck: supple, no thyromegaly, no lymphadenopathy  Respiratory: non-labored respirations, no stridor  Cardiovascular: normal peripheral perfusion, normal rate  Abdomen: soft, non-tender, non-distended  Extremities: no gross deformity, no clubbing, cyanosis, or edema  Skin: no lesions or rash to head/neck  Neurologic: CNII-XII intact, alert and oriented x 3  Lymphatic: no cervical lymphadenopathy  Psych: appropriate mood/affect  Assessment/Plan  ?  36 year old F with CRS.  - To OR for CRS with FESS/turbinate today  ?  ?  17: No changes to H&P above. PCP increased dose of BP medication, BP better controlled per report. Here for surgery. Problem List/Past Medical History  ??Acid reflux  ?Allergic rhinitis  ?HTN - Hypertension  ?Hypertension  ?Morbid obesity  ?Historical  ???No historical problems  Procedure/Surgical History  EMB (2016),  section (), Cholecystectomy (),  section (), cerclage.  Medications  ?Inpatient  ???Ancef, 2 gm, IV Piggyback, On Call  ???Ancef, 2 gm, 50 mL, IV Piggyback, On Call  ???Depo-Provera 150 mg/mL intramuscular suspension, 150 mg, IM, Once  ???Depo-Provera 150 mg/mL intramuscular suspension, 150 mg, IM, Once  ???Depo-Provera 150 mg/mL intramuscular suspension, 150 mg, IM, Once  ???Valium 5 mg oral tablet, 10 mg, 2 tab(s), Oral, On Call, PRN  ?Home  ???cetirizine 10 mg oral tablet, 10 mg, 1 tab(s), Oral, Daily, 2 refills  ???Colace 100 mg oral capsule, 100 mg, 1 cap(s), Oral, BID, PRN, 1 refills  ???ferrous gluconate 325 mg oral tablet, 325 mg, 1 tab(s), Oral, Daily, 6 refills  ???Flonase 50 mcg/inh nasal spray, 2 spray(s), Nasal, BID, 2 refills  ???hydrOXYzine hydrochloride 50 mg oral tablet, 50 mg, 1 tab(s), Oral, Daily, PRN  ???lisinopril 10 mg oral tablet, 10 mg, 1 tab(s), Oral, Daily, 6 refills  Allergies  Tylox  Social History  ??Alcohol - Low  Risk  ???Current, Wine, 1-2 times per month  ??Employment/School  ???Employed  ??Exercise  ???Exercise type: does not exercise.  ??Home/Environment  ???Lives with Children. Injuries/Abuse/Neglect in household: No. Feels unsafe at home: No.  ??Nutrition/Health  ???Regular  ??Other  ???  ??Sexual  ???  ??Substance Abuse - Denies Substance Abuse  ???Never  ??Tobacco - Denies Tobacco Use  ???Never smoker  ?  ?   [1]?ENT Surgery H & P; Yadiel KIDD, Edith 06/30/2017 08:39 CDT

## 2022-05-04 NOTE — HISTORICAL [facility]ERNER
This is a historical note converted from [place]. Formatting and pictures may have been removed.  Please reference [place] for original formatting and attached multimedia. History of Present Illness  Patient is a 37 yo F w/ PMH of HTN who presents for EGD. Patient has a multi year history of GERD, which is currently treated with Zantac. She states that she still intermittently experiences heartburn symptoms as well as globus sensation with solid foods. She states that she cant eat spicy foods or bready foods as they seem to get stuck in her throat and she ends up having nausea/vomiting. Denies any blood in her emesis. Denies abdominal pain. Has regular BMs. No BRBPR. Denies NSAID use. Denies any family history of GI cancers. Denies any smoking. Occasional (1x monthly) EtOH use.  Was sent for UGI which demonstrated narrowing of GE junction with proximal dilation.  Review of Systems  12 pt ROS negative except for stated above  Physical Exam  Vitals & Measurements  T:?36.8? ?C (Oral)? HR:?85(Monitored)? RR:?18? BP:?163/114? SpO2:?99%? WT:?139.9?kg?  NAD  NC/AT  RRR  CTAB  S/NT/ND  2+ pulses  Assessment/Plan  37 yo F w/ dysphagia, globus sensation, GERD  - EGD today   Problem List/Past Medical History  Ongoing  Acid reflux  Allergic rhinitis  HTN - Hypertension  Hypertension  Morbid obesity  Historical  No qualifying data  Procedure/Surgical History  Drainage of Left Ethmoid Sinus, Percutaneous Endoscopic Approach (08/11/2017)  Drainage of Left Maxillary Sinus, Percutaneous Endoscopic Approach (08/11/2017)  Drainage of Left Sphenoid Sinus, Percutaneous Endoscopic Approach (08/11/2017)  Drainage of Right Ethmoid Sinus, Percutaneous Endoscopic Approach (08/11/2017)  Drainage of Right Maxillary Sinus, Percutaneous Endoscopic Approach (08/11/2017)  Drainage of Right Sphenoid Sinus, Percutaneous Endoscopic Approach (08/11/2017)  Excision of Nasal Turbinate, Percutaneous Endoscopic Approach (08/11/2017)  FESS/Functional  Endoscopic Sinus Surgery (None) (2017)  Nasal/sinus endoscopy, surgical, with maxillary antrostomy; (2017)  Nasal/sinus endoscopy, surgical, with sphenoidotomy; (2017)  Nasal/sinus endoscopy, surgical; with ethmoidectomy, total (anterior and posterior) (2017)  Reposition Nasal Turbinate, Via Natural or Artificial Opening Endoscopic (2017)  Stereotactic computer-assisted (navigational) procedure; cranial, extradural (List separately in addition to code for primary procedure) (2017)  Submucous resection inferior turbinate, partial or complete, any method (2017)  Turbinate Reduction (None, Inferior) (2017)  EMB (2016)   section ()  Cholecystectomy ()   section ()  Bilateral tubal ligation  cerclage   Medications  Inpatient  buffered lidocaine 2% - 0.5 ml syringe, 10 mg= 0.5 mL, Subcutaneous, As Directed  IVF Lactated Ringers LR Infusion 1,000 mL, 1000 mL, IV  Home  acetaminophen/butalbital/caffeine 300 mg-50 mg-40 mg oral capsule  Astelin 137 mcg/inh nasal spray, 1 spray(s), Nasal, TID, PRN, 3 refills  cetirizine 10 mg oral tablet, 10 mg= 1 tab(s), Oral, Daily, 6 refills  Colace 100 mg oral capsule, 100 mg= 1 cap(s), Oral, BID, PRN, 3 refills  dextromethorphan-promethazine 15 mg-6.25 mg/5 mL oral syrup, Oral  ferrous sulfate 325 mg (65 mg elemental iron) oral delayed release tablet, 325 mg= 1 tab(s), Oral, Daily, 4 refills  Flonase 50 mcg/inh nasal spray, 1 spray(s), Nasal, BID, 11 refills  HYDROXYZINE FITO 25 MG CAP, 25 mg= 1 cap(s), Oral, Daily  lisinopril 10 mg oral tablet, See Instructions, 5 refills  naproxen 250 mg oral tablet, 250 mg= 1 tab(s), Oral, BID  TRAZODONE 50 MG TABLET, Oral  Vitamin D 50,000 intl units oral capsule, 02257 IntUnit= 1 cap(s), Oral, qFriday  Allergies  Tylox?(tounge swelling, edema)  Social History  Alcohol - Low Risk, 2015  Current, Wine, Liquor, 04/15/2019  Employment/School  Employed,  06/01/2015  Exercise  Exercise duration: 90. Exercise frequency: 1-2 times/week. Self assessment: Poor condition. Exercise type: Aerobics., 07/27/2017  Home/Environment  Lives with Children. Living situation: Home/Independent. Alcohol abuse in household: No. Substance abuse in household: No. Smoker in household: No. Injuries/Abuse/Neglect in household: No. Feels unsafe at home: No., 07/26/2017  Nutrition/Health  Regular, low sodium, Wants to lose weight: Yes., 07/26/2017  Other  Sexual  Sexually active: Yes. Sexually active at age 17 Years. Number of current partners 1. Number of lifetime partners 7. Sexual orientation: Heterosexual. Uses condoms: No. Other contraceptive use: Tubaligation. History of sexual abuse: No., 11/12/2018  Substance Abuse - Denies Substance Abuse, 06/01/2015  Never, 04/15/2019  Tobacco - Denies Tobacco Use, 06/01/2015  Never (less than 100 in lifetime), N/A, 04/15/2019  Family History  Cardiac arrest.: Brother.  Diabetes mellitus type 1.: Father.  Hypertension.: Mother and Father.  Immunizations  Vaccine Date Status   tetanus/diphtheria/pertussis, acel(Tdap) 10/17/2018 Given   influenza virus vaccine, inactivated 10/17/2018 Given   influenza virus vaccine, inactivated 10/03/2016 Given       Dysphagia primarily to breads (pancakes, biscuits) and has to regurgitate it up when she eats these things.? Denies any weight loss.? Has heartburn, reflux for which she takes prilosec otc.?Has to take colace for bowel movements.? no melena or rectal bleeding.? Reports an EGD in the past x 2.? does not recall findings.? Had?UGI?series?that showed persistent narrowing at the GE junction.

## 2022-05-22 ENCOUNTER — HOSPITAL ENCOUNTER (EMERGENCY)
Facility: HOSPITAL | Age: 42
Discharge: ANOTHER HOSPITAL | End: 2022-05-22
Attending: SPECIALIST
Payer: MEDICAID

## 2022-05-22 VITALS
RESPIRATION RATE: 24 BRPM | TEMPERATURE: 98 F | DIASTOLIC BLOOD PRESSURE: 107 MMHG | SYSTOLIC BLOOD PRESSURE: 159 MMHG | HEART RATE: 90 BPM | OXYGEN SATURATION: 99 %

## 2022-05-22 DIAGNOSIS — E87.6 HYPOKALEMIA: ICD-10-CM

## 2022-05-22 DIAGNOSIS — R73.9 ELEVATED BLOOD SUGAR: ICD-10-CM

## 2022-05-22 DIAGNOSIS — K85.90 ACUTE PANCREATITIS, UNSPECIFIED COMPLICATION STATUS, UNSPECIFIED PANCREATITIS TYPE: Primary | ICD-10-CM

## 2022-05-22 LAB
ALBUMIN SERPL-MCNC: 3.9 GM/DL (ref 3.5–5)
ALBUMIN/GLOB SERPL: 0.7 RATIO (ref 1.1–2)
ALP SERPL-CCNC: 193 UNIT/L (ref 40–150)
ALT SERPL-CCNC: 520 UNIT/L (ref 0–55)
AMPHET UR QL SCN: NEGATIVE
ANISOCYTOSIS BLD QL SMEAR: SLIGHT
APPEARANCE UR: ABNORMAL
AST SERPL-CCNC: 510 UNIT/L (ref 5–34)
B-HCG SERPL QL: NEGATIVE
BACTERIA #/AREA URNS AUTO: ABNORMAL /HPF
BARBITURATE SCN PRESENT UR: NEGATIVE
BASOPHILS # BLD AUTO: 0.02 X10(3)/MCL (ref 0–0.2)
BASOPHILS NFR BLD AUTO: 0.1 %
BENZODIAZ UR QL SCN: POSITIVE
BILIRUB UR QL STRIP.AUTO: ABNORMAL MG/DL
BILIRUBIN DIRECT+TOT PNL SERPL-MCNC: 4.3 MG/DL
BUN SERPL-MCNC: 8.3 MG/DL (ref 7–18.7)
CALCIUM SERPL-MCNC: 9.9 MG/DL (ref 8.4–10.2)
CANNABINOIDS UR QL SCN: NEGATIVE
CHLORIDE SERPL-SCNC: 99 MMOL/L (ref 98–107)
CO2 SERPL-SCNC: 27 MMOL/L (ref 22–29)
COCAINE UR QL SCN: NEGATIVE
COLOR UR AUTO: ABNORMAL
CREAT SERPL-MCNC: 1.24 MG/DL (ref 0.55–1.02)
EOSINOPHIL # BLD AUTO: 0.02 X10(3)/MCL (ref 0–0.9)
EOSINOPHIL NFR BLD AUTO: 0.1 %
ERYTHROCYTE [DISTWIDTH] IN BLOOD BY AUTOMATED COUNT: 17.5 % (ref 11.5–17)
GLOBULIN SER-MCNC: 5.3 GM/DL (ref 2.4–3.5)
GLUCOSE SERPL-MCNC: 163 MG/DL (ref 74–100)
GLUCOSE UR QL STRIP.AUTO: 100 MG/DL
HCT VFR BLD AUTO: 43.7 % (ref 37–47)
HGB BLD-MCNC: 14.2 GM/DL (ref 12–16)
IMM GRANULOCYTES # BLD AUTO: 0.06 X10(3)/MCL (ref 0–0.02)
IMM GRANULOCYTES NFR BLD AUTO: 0.3 % (ref 0–0.43)
KETONES UR QL STRIP.AUTO: ABNORMAL MG/DL
LEUKOCYTE ESTERASE UR QL STRIP.AUTO: ABNORMAL UNIT/L
LIPASE SERPL-CCNC: >3000 U/L
LYMPHOCYTES # BLD AUTO: 1.54 X10(3)/MCL (ref 0.6–4.6)
LYMPHOCYTES NFR BLD AUTO: 7.3 %
MCH RBC QN AUTO: 24 PG (ref 27–31)
MCHC RBC AUTO-ENTMCNC: 32.5 MG/DL (ref 33–36)
MCV RBC AUTO: 73.8 FL (ref 80–94)
MONOCYTES # BLD AUTO: 1.72 X10(3)/MCL (ref 0.1–1.3)
MONOCYTES NFR BLD AUTO: 8.2 %
NEUTROPHILS # BLD AUTO: 17.7 X10(3)/MCL (ref 2.1–9.2)
NEUTROPHILS NFR BLD AUTO: 84 %
NITRITE UR QL STRIP.AUTO: NEGATIVE
OPIATES UR QL SCN: NEGATIVE
PCP UR QL: NEGATIVE
PH UR STRIP.AUTO: 6 [PH]
PH UR: 6 [PH] (ref 3–11)
PLATELET # BLD AUTO: 392 X10(3)/MCL (ref 130–400)
PLATELET # BLD EST: ADEQUATE 10*3/UL
PMV BLD AUTO: 10.6 FL (ref 9.4–12.4)
POTASSIUM SERPL-SCNC: 2.9 MMOL/L (ref 3.5–5.1)
PROT SERPL-MCNC: 9.2 GM/DL (ref 6.4–8.3)
PROT UR QL STRIP.AUTO: 100 MG/DL
RBC # BLD AUTO: 5.92 X10(6)/MCL (ref 4.2–5.4)
RBC #/AREA URNS AUTO: ABNORMAL /HPF
RBC MORPH BLD: ABNORMAL
RBC UR QL AUTO: ABNORMAL UNIT/L
SARS-COV-2 RNA RESP QL NAA+PROBE: NOT DETECTED
SODIUM SERPL-SCNC: 141 MMOL/L (ref 136–145)
SP GR UR STRIP.AUTO: 1.02
SPECIFIC GRAVITY, URINE AUTO (.000) (OHS): 1.02 (ref 1–1.03)
SQUAMOUS #/AREA URNS AUTO: ABNORMAL /LPF
UROBILINOGEN UR STRIP-ACNC: 4 MG/DL
WBC # SPEC AUTO: 21.1 X10(3)/MCL (ref 4.5–11.5)
WBC #/AREA URNS AUTO: ABNORMAL /HPF

## 2022-05-22 PROCEDURE — 85025 COMPLETE CBC W/AUTO DIFF WBC: CPT | Performed by: SPECIALIST

## 2022-05-22 PROCEDURE — 36415 COLL VENOUS BLD VENIPUNCTURE: CPT | Performed by: SPECIALIST

## 2022-05-22 PROCEDURE — 80307 DRUG TEST PRSMV CHEM ANLYZR: CPT | Performed by: SPECIALIST

## 2022-05-22 PROCEDURE — 81025 URINE PREGNANCY TEST: CPT | Performed by: SPECIALIST

## 2022-05-22 PROCEDURE — 63600175 PHARM REV CODE 636 W HCPCS: Performed by: SPECIALIST

## 2022-05-22 PROCEDURE — 96374 THER/PROPH/DIAG INJ IV PUSH: CPT

## 2022-05-22 PROCEDURE — 80053 COMPREHEN METABOLIC PANEL: CPT | Performed by: SPECIALIST

## 2022-05-22 PROCEDURE — 96375 TX/PRO/DX INJ NEW DRUG ADDON: CPT

## 2022-05-22 PROCEDURE — 99285 EMERGENCY DEPT VISIT HI MDM: CPT | Mod: 25

## 2022-05-22 PROCEDURE — 87635 SARS-COV-2 COVID-19 AMP PRB: CPT | Performed by: SPECIALIST

## 2022-05-22 PROCEDURE — 96376 TX/PRO/DX INJ SAME DRUG ADON: CPT

## 2022-05-22 PROCEDURE — 81001 URINALYSIS AUTO W/SCOPE: CPT | Mod: 59 | Performed by: SPECIALIST

## 2022-05-22 PROCEDURE — 83690 ASSAY OF LIPASE: CPT | Performed by: SPECIALIST

## 2022-05-22 PROCEDURE — 96361 HYDRATE IV INFUSION ADD-ON: CPT

## 2022-05-22 PROCEDURE — 25000003 PHARM REV CODE 250: Performed by: SPECIALIST

## 2022-05-22 RX ORDER — MORPHINE SULFATE 4 MG/ML
4 INJECTION, SOLUTION INTRAMUSCULAR; INTRAVENOUS
Status: COMPLETED | OUTPATIENT
Start: 2022-05-22 | End: 2022-05-22

## 2022-05-22 RX ORDER — HYDROCODONE BITARTRATE AND ACETAMINOPHEN 5; 325 MG/1; MG/1
1 TABLET ORAL
Status: DISCONTINUED | OUTPATIENT
Start: 2022-05-22 | End: 2022-05-22

## 2022-05-22 RX ORDER — ONDANSETRON 2 MG/ML
4 INJECTION INTRAMUSCULAR; INTRAVENOUS
Status: COMPLETED | OUTPATIENT
Start: 2022-05-22 | End: 2022-05-22

## 2022-05-22 RX ORDER — ACETAMINOPHEN 500 MG
1000 TABLET ORAL
Status: COMPLETED | OUTPATIENT
Start: 2022-05-22 | End: 2022-05-22

## 2022-05-22 RX ADMIN — POTASSIUM BICARBONATE 25 MEQ: 977.5 TABLET, EFFERVESCENT ORAL at 04:05

## 2022-05-22 RX ADMIN — SODIUM CHLORIDE, POTASSIUM CHLORIDE, SODIUM LACTATE AND CALCIUM CHLORIDE 1000 ML: 600; 310; 30; 20 INJECTION, SOLUTION INTRAVENOUS at 04:05

## 2022-05-22 RX ADMIN — MORPHINE SULFATE 4 MG: 4 INJECTION INTRAVENOUS at 03:05

## 2022-05-22 RX ADMIN — MORPHINE SULFATE 4 MG: 4 INJECTION INTRAVENOUS at 06:05

## 2022-05-22 RX ADMIN — ACETAMINOPHEN 1000 MG: 500 TABLET, FILM COATED ORAL at 02:05

## 2022-05-22 RX ADMIN — SODIUM CHLORIDE 500 ML: 9 INJECTION, SOLUTION INTRAVENOUS at 02:05

## 2022-05-22 RX ADMIN — ONDANSETRON 4 MG: 2 INJECTION INTRAMUSCULAR; INTRAVENOUS at 02:05

## 2022-05-22 NOTE — ED NOTES
Patient more alert now talking states her anxiety is bad c/o abdominal pain urine collected sent to lab

## 2022-05-22 NOTE — ED PROVIDER NOTES
Encounter Date: 5/22/2022       History     Chief Complaint   Patient presents with    Weakness     [facility]  Zofran 4 mg in route c/o weakness nausea vomiting denies diarrhea denies cough or fever weak speaking so quietly unable to understand      Patient c/o N/V with generalized abdominal pain beginning yesterday and reports constipation over three days; no fever or diarrhea; patient seems sedated and admits to Xanax for her anxiety        Review of patient's allergies indicates:   Allergen Reactions    Tylox [oxycodone-acetaminophen] Anaphylaxis     History reviewed. No pertinent past medical history.  History reviewed. No pertinent surgical history.  History reviewed. No pertinent family history.     Review of Systems   Constitutional: Positive for fatigue. Negative for chills and fever.   HENT: Negative.    Respiratory: Negative for cough and shortness of breath.    Cardiovascular: Negative for chest pain and palpitations.   Gastrointestinal: Positive for abdominal pain. Negative for abdominal distention, anal bleeding and blood in stool.   Skin: Negative.    Neurological: Negative for dizziness, light-headedness and headaches.   All other systems reviewed and are negative.      Physical Exam     Initial Vitals   BP Pulse Resp Temp SpO2   05/22/22 0041 05/22/22 0041 05/22/22 0041 05/22/22 0248 05/22/22 0041   (!) 106/51 91 20 97.6 °F (36.4 °C) 96 %      MAP       --                Physical Exam    Nursing note and vitals reviewed.  Constitutional: She appears well-developed and well-nourished.   HENT:   Head: Normocephalic and atraumatic.   Eyes: EOM are normal. Pupils are equal, round, and reactive to light.   Neck: Neck supple.   Normal range of motion.  Cardiovascular: Normal rate, regular rhythm, normal heart sounds and intact distal pulses.   Pulmonary/Chest: Breath sounds normal.   Abdominal: Abdomen is soft. Bowel sounds are normal. There is abdominal tenderness (generalized). There is  guarding.   Musculoskeletal:         General: Normal range of motion.      Cervical back: Normal range of motion and neck supple.     Neurological: She is alert and oriented to person, place, and time. She has normal strength.   Skin: Skin is warm and dry.         ED Course   Procedures  Labs Reviewed   COMPREHENSIVE METABOLIC PANEL - Abnormal; Notable for the following components:       Result Value    Potassium Level 2.9 (*)     Glucose Level 163 (*)     Creatinine 1.24 (*)     Protein Total 9.2 (*)     Globulin 5.3 (*)     Albumin/Globulin Ratio 0.7 (*)     Bilirubin Total 4.3 (*)     Alkaline Phosphatase 193 (*)     Alanine Aminotransferase 520 (*)     Aspartate Aminotransferase 510 (*)     All other components within normal limits   URINALYSIS, REFLEX TO URINE CULTURE - Abnormal; Notable for the following components:    Color, UA Dark Yellow (*)     Appearance, UA Slightly Cloudy (*)     Protein,   (*)     Glucose,   (*)     Ketones, UA Trace (*)     Blood, UA Trace-Lysed (*)     Bilirubin, UA Large (*)     Urobilinogen, UA 4.0 (*)     Leukocyte Esterase, UA Small (*)     All other components within normal limits   CBC WITH DIFFERENTIAL - Abnormal; Notable for the following components:    WBC 21.1 (*)     RBC 5.92 (*)     MCV 73.8 (*)     MCH 24.0 (*)     MCHC 32.5 (*)     RDW 17.5 (*)     Neut # 17.7 (*)     Mono # 1.72 (*)     IG# 0.06 (*)     All other components within normal limits   BLOOD SMEAR MICROSCOPIC EXAM ([facility]) - Abnormal; Notable for the following components:    RBC Morph Abnormal (*)     Anisocyte Slight (*)     All other components within normal limits   DRUG SCREEN, URINE (BEAKER) - Abnormal; Notable for the following components:    Benzodiazepine, Urine Positive (*)     All other components within normal limits   LIPASE - Abnormal; Notable for the following components:    Lipase Level >3,000 (*)     All other components within normal limits   URINALYSIS, MICROSCOPIC - Abnormal;  Notable for the following components:    Bacteria, UA Moderate (*)     WBC, UA 6-10 (*)     Squamous Epithelial Cells, UA Moderate (*)     All other components within normal limits   PREGNANCY TEST, URINE RAPID - Normal   SARS-COV-2 (COVID-19) QUALITATIVE PCR - Normal   CBC W/ AUTO DIFFERENTIAL    Narrative:     The following orders were created for panel order CBC auto differential.  Procedure                               Abnormality         Status                     ---------                               -----------         ------                     CBC with Differential[[id number]]        Abnormal            Final result                 Please view results for these tests on the individual orders.     EKG Readings: (Independently Interpreted)   Rhythm: Normal Sinus Rhythm. Ectopy: No Ectopy.   Minimal voltage criteria for LVH, may be normal variant; Prolonged QT       Imaging Results          CT Abdomen Pelvis  Without Contrast (Preliminary result)  Result time 05/22/22 04:01:58    Preliminary result by Dre Veloz MD (05/22/22 03:54:57)                 Narrative:    START OF REPORT:  Technique: CT of the abdomen and pelvis was performed with axial images as well as sagittal and coronal reconstruction images without intravenous contrast.    Comparison: None available.    Clinical History: Weakness, nausea, vomiting, abdominal pain.    Dosage Information: Automated Exposure Control was utilized.    Findings:  Thorax:  Lungs: There is nonspecific dependent change at the lung bases.  Pleura: No effusions or thickening.  Heart: The heart size is within normal limits.  Abdomen:  Abdominal Wall: No abdominal wall pathology is seen.  Liver: The liver appears unremarkable.  Biliary System: No intrahepatic or extrahepatic biliary duct dilatation is seen.  Gallbladder: Surgical clips are seen in the gallbladder fossa consistent with prior cholecystectomy.  Pancreas: Edematous pancreas with surrounding peripancreatic  fat stranding and fluid in the right anterior pararenal spaces. This is consistent with acute pancreatitis. No pancreatic pseudocyst is identified.  Spleen: The spleen appears unremarkable.  Adrenals: The adrenal glands appear unremarkable.  Kidneys: The kidneys appear unremarkable with no stones cysts masses or hydronephrosis.  Aorta: The abdominal aorta appears unremarkable.  IVC: Unremarkable.  Bowel:  Esophagus: The visualized esophagus appears unremarkable.  Stomach: The stomach appears unremarkable.  Duodenum: Unremarkable appearing duodenum.  Small Bowel: The small bowel appears unremarkable.  Colon: Nondistended.  Appendix: The appendix appears unremarkable.  Peritoneum: No intraperitoneal free air or ascites is seen.    Pelvis: Calcific densities are seen in the pelvis, likely reflecting phleboliths.  Bladder: The bladder appears unremarkable.  Female:  Uterus: The uterus is surgically absent.  Ovaries: No adnexal masses are seen.    Bony structures:  Dorsal Spine: There is mild spondylosis of the visualized dorsal spine.      Impression:  1. Edematous pancreas with surrounding peripancreatic fat stranding and fluid in the right anterior pararenal spaces. This is consistent with acute pancreatitis. No pancreatic pseudocyst is identified. Correlate with clinical and laboratory findings as regards further evaluation and followup.  2. Details and other findings as discussed above.                                   Medications   lactated ringers bolus 1,000 mL (1,000 mLs Intravenous New Bag 5/22/22 0450)   ondansetron injection 4 mg (4 mg Intravenous Given 5/22/22 0213)   acetaminophen tablet 1,000 mg (1,000 mg Oral Given 5/22/22 0225)   sodium chloride 0.9% bolus 500 mL (0 mLs Intravenous Stopped 5/22/22 0353)   morphine injection 4 mg (4 mg Intravenous Given 5/22/22 0337)   potassium bicarbonate disintegrating tablet 25 mEq (25 mEq Oral Given 5/22/22 0451)     Patient Vitals for the past 24 hrs:   BP Temp  Temp src Pulse Resp SpO2   05/22/22 0400 (!) 159/107 -- -- 96 (!) 25 96 %   05/22/22 0337 -- -- -- -- 20 --   05/22/22 0248 -- 97.6 °F (36.4 °C) Oral -- -- --   05/22/22 0216 -- -- -- -- (!) 26 --   05/22/22 0207 136/84 -- -- 99 (!) 26 --   05/22/22 0041 (!) 106/51 -- -- 91 20 96 %     Progressing as expected, pain controlled, no emesis, nausea improved  Medical Decision Making:   Differential Diagnosis:    Viral illness; Dehydration; Electrolyte abnormality; Bowel obstruction; hepatitis; pancreatitis  Clinical Tests:   Lab Tests: Ordered  Radiological Study: Ordered  Medical Tests: Ordered  ED Management:  Must consider choledocholithiasis and will seek accepting facility for GI evaluation                    Accepted at [facility]  Clinical Impression:   Final diagnoses:  [K85.90] Acute pancreatitis, unspecified complication status, unspecified pancreatitis type (Primary)  [R73.9] Elevated blood sugar  [E87.6] Hypokalemia          ED Disposition Condition    Transfer to Another Facility Stable              Titus Cespedes MD  05/22/22 7822

## 2022-05-22 NOTE — ED NOTES
Report given to [facility] ED nurse; [facility] arrived to transport patient

## 2022-06-10 ENCOUNTER — TELEPHONE (OUTPATIENT)
Dept: INTERNAL MEDICINE | Facility: CLINIC | Age: 42
End: 2022-06-10
Payer: MEDICAID

## 2022-06-10 DIAGNOSIS — Z12.39 ENCOUNTER FOR SCREENING FOR MALIGNANT NEOPLASM OF BREAST, UNSPECIFIED SCREENING MODALITY: Primary | ICD-10-CM

## 2022-06-10 NOTE — TELEPHONE ENCOUNTER
----- Message from Trisha Callejas sent at 6/10/2022 12:53 PM CDT -----  Regarding: Dr. Cifuentes-[facility] Order  Patient is requesting an order for her year [facility]. Thanks

## 2022-06-16 ENCOUNTER — TELEPHONE (OUTPATIENT)
Dept: INTERNAL MEDICINE | Facility: CLINIC | Age: 42
End: 2022-06-16
Payer: MEDICAID

## 2022-06-16 ENCOUNTER — LAB VISIT (OUTPATIENT)
Dept: LAB | Facility: HOSPITAL | Age: 42
End: 2022-06-16
Attending: STUDENT IN AN ORGANIZED HEALTH CARE EDUCATION/TRAINING PROGRAM
Payer: MEDICAID

## 2022-06-16 DIAGNOSIS — Z00.00 ROUTINE GENERAL MEDICAL EXAMINATION AT A HEALTH CARE FACILITY: Primary | ICD-10-CM

## 2022-06-16 LAB
ALBUMIN SERPL-MCNC: 3.3 GM/DL (ref 3.5–5)
ALBUMIN/GLOB SERPL: 0.7 RATIO (ref 1.1–2)
ALP SERPL-CCNC: 80 UNIT/L (ref 40–150)
ALT SERPL-CCNC: 12 UNIT/L (ref 0–55)
APPEARANCE UR: CLEAR
AST SERPL-CCNC: 13 UNIT/L (ref 5–34)
BACTERIA #/AREA URNS AUTO: ABNORMAL /HPF
BASOPHILS # BLD AUTO: 0.03 X10(3)/MCL (ref 0–0.2)
BASOPHILS NFR BLD AUTO: 0.5 %
BILIRUB UR QL STRIP.AUTO: ABNORMAL MG/DL
BILIRUBIN DIRECT+TOT PNL SERPL-MCNC: 0.4 MG/DL
BUN SERPL-MCNC: 6.9 MG/DL (ref 7–18.7)
CALCIUM SERPL-MCNC: 9.1 MG/DL (ref 8.4–10.2)
CHLORIDE SERPL-SCNC: 105 MMOL/L (ref 98–107)
CO2 SERPL-SCNC: 29 MMOL/L (ref 22–29)
COLOR UR AUTO: YELLOW
CREAT SERPL-MCNC: 0.71 MG/DL (ref 0.55–1.02)
DEPRECATED CALCIDIOL+CALCIFEROL SERPL-MC: 28.8 NG/ML (ref 30–80)
EOSINOPHIL # BLD AUTO: 0.07 X10(3)/MCL (ref 0–0.9)
EOSINOPHIL NFR BLD AUTO: 1.1 %
ERYTHROCYTE [DISTWIDTH] IN BLOOD BY AUTOMATED COUNT: 17.1 % (ref 11.5–17)
GLOBULIN SER-MCNC: 4.6 GM/DL (ref 2.4–3.5)
GLUCOSE SERPL-MCNC: 100 MG/DL (ref 74–100)
GLUCOSE UR QL STRIP.AUTO: NEGATIVE MG/DL
HCT VFR BLD AUTO: 36.9 % (ref 37–47)
HGB BLD-MCNC: 12.4 GM/DL (ref 12–16)
KETONES UR QL STRIP.AUTO: ABNORMAL MG/DL
LEUKOCYTE ESTERASE UR QL STRIP.AUTO: NEGATIVE UNIT/L
LYMPHOCYTES # BLD AUTO: 1.92 X10(3)/MCL (ref 0.6–4.6)
LYMPHOCYTES NFR BLD AUTO: 29.5 %
MCH RBC QN AUTO: 25.8 PG (ref 27–31)
MCHC RBC AUTO-ENTMCNC: 33.6 MG/DL (ref 33–36)
MCV RBC AUTO: 76.7 FL (ref 80–94)
MONOCYTES # BLD AUTO: 0.75 X10(3)/MCL (ref 0.1–1.3)
MONOCYTES NFR BLD AUTO: 11.5 %
NEUTROPHILS # BLD AUTO: 3.7 X10(3)/MCL (ref 2.1–9.2)
NEUTROPHILS NFR BLD AUTO: 57.2 %
NITRITE UR QL STRIP.AUTO: NEGATIVE
PH UR STRIP.AUTO: 6.5 [PH]
PLATELET # BLD AUTO: 284 X10(3)/MCL (ref 130–400)
PMV BLD AUTO: 10.5 FL (ref 9.4–12.4)
POTASSIUM SERPL-SCNC: 3.6 MMOL/L (ref 3.5–5.1)
PROT SERPL-MCNC: 7.9 GM/DL (ref 6.4–8.3)
PROT UR QL STRIP.AUTO: ABNORMAL MG/DL
RBC # BLD AUTO: 4.81 X10(6)/MCL (ref 4.2–5.4)
RBC #/AREA URNS AUTO: ABNORMAL /HPF
RBC UR QL AUTO: NEGATIVE UNIT/L
SODIUM SERPL-SCNC: 144 MMOL/L (ref 136–145)
SP GR UR STRIP.AUTO: 1.02
SQUAMOUS #/AREA URNS AUTO: ABNORMAL /HPF
UROBILINOGEN UR STRIP-ACNC: 1 MG/DL
WBC # SPEC AUTO: 6.5 X10(3)/MCL (ref 4.5–11.5)
WBC #/AREA URNS AUTO: ABNORMAL /HPF

## 2022-06-16 PROCEDURE — 81001 URINALYSIS AUTO W/SCOPE: CPT

## 2022-06-16 PROCEDURE — 80053 COMPREHEN METABOLIC PANEL: CPT

## 2022-06-16 PROCEDURE — 85025 COMPLETE CBC W/AUTO DIFF WBC: CPT

## 2022-06-16 PROCEDURE — 82306 VITAMIN D 25 HYDROXY: CPT

## 2022-06-16 PROCEDURE — 36415 COLL VENOUS BLD VENIPUNCTURE: CPT

## 2022-06-20 RX ORDER — OXYCODONE HYDROCHLORIDE 5 MG/1
5 TABLET ORAL EVERY 6 HOURS PRN
COMMUNITY
Start: 2021-12-21 | End: 2022-08-22

## 2022-06-20 RX ORDER — MEDROXYPROGESTERONE ACETATE 10 MG/1
10 TABLET ORAL DAILY
COMMUNITY
Start: 2021-12-21 | End: 2022-08-22

## 2022-06-20 RX ORDER — SENNOSIDES 8.6 MG/1
34.4 TABLET ORAL NIGHTLY PRN
COMMUNITY
Start: 2021-11-11 | End: 2022-12-06

## 2022-06-20 RX ORDER — ERGOCALCIFEROL 1.25 MG/1
50000 CAPSULE ORAL
COMMUNITY
Start: 2021-12-21 | End: 2023-02-01

## 2022-06-20 RX ORDER — SUMATRIPTAN SUCCINATE 100 MG/1
100 TABLET ORAL EVERY 12 HOURS PRN
COMMUNITY
Start: 2022-06-06 | End: 2022-06-21 | Stop reason: SDUPTHER

## 2022-06-20 RX ORDER — DOXEPIN HYDROCHLORIDE 100 MG/1
100 CAPSULE ORAL NIGHTLY
COMMUNITY
Start: 2022-05-11 | End: 2023-02-01

## 2022-06-20 RX ORDER — LISINOPRIL AND HYDROCHLOROTHIAZIDE 12.5; 2 MG/1; MG/1
1 TABLET ORAL DAILY
COMMUNITY
Start: 2022-05-24 | End: 2022-06-21

## 2022-06-20 RX ORDER — ESZOPICLONE 3 MG/1
3 TABLET, FILM COATED ORAL NIGHTLY
COMMUNITY
Start: 2022-03-15 | End: 2022-08-22

## 2022-06-20 RX ORDER — ALPRAZOLAM 2 MG/1
2 TABLET ORAL 3 TIMES DAILY
COMMUNITY
Start: 2022-05-16 | End: 2023-02-01

## 2022-06-20 RX ORDER — NIFEDIPINE 90 MG/1
90 TABLET, EXTENDED RELEASE ORAL DAILY
COMMUNITY
Start: 2022-05-28 | End: 2022-10-13

## 2022-06-20 RX ORDER — LEVOCETIRIZINE DIHYDROCHLORIDE 5 MG/1
5 TABLET, FILM COATED ORAL NIGHTLY
COMMUNITY
Start: 2021-12-21 | End: 2022-12-06

## 2022-06-20 RX ORDER — AZELASTINE 1 MG/ML
1 SPRAY, METERED NASAL 2 TIMES DAILY
COMMUNITY
Start: 2021-10-11 | End: 2022-12-06

## 2022-06-20 RX ORDER — ZOLPIDEM TARTRATE 10 MG/1
10 TABLET ORAL NIGHTLY PRN
COMMUNITY
End: 2022-12-06

## 2022-06-20 RX ORDER — FLUTICASONE PROPIONATE 50 MCG
1 SPRAY, SUSPENSION (ML) NASAL 2 TIMES DAILY
COMMUNITY
Start: 2022-06-03 | End: 2023-02-01 | Stop reason: SDUPTHER

## 2022-06-20 RX ORDER — VENLAFAXINE HYDROCHLORIDE 150 MG/1
150 CAPSULE, EXTENDED RELEASE ORAL DAILY
COMMUNITY
Start: 2022-04-18 | End: 2023-01-24 | Stop reason: SDUPTHER

## 2022-06-20 RX ORDER — PANTOPRAZOLE SODIUM 40 MG/1
40 TABLET, DELAYED RELEASE ORAL EVERY MORNING
COMMUNITY
Start: 2022-05-28 | End: 2022-12-06

## 2022-06-20 RX ORDER — LORAZEPAM 2 MG/1
2 TABLET ORAL EVERY 6 HOURS PRN
COMMUNITY
End: 2022-08-22

## 2022-06-20 NOTE — PROGRESS NOTES
INTERNAL MEDICINE RESIDENT CLINIC  CLINIC NOTE    Patient Name: Abdi Combs  YOB: 1980  Chief Complaint: Follow-up and Hypertension (Pt states was hospitalized for pancreatitis at [facility] for 1 week  stil with pain in lower flank right side)     PRESENTING HISTORY   History of Present Illness:  Ms. Abdi Combs is a 42 y.o. female w/ PMH hypertension, GERD, anxiety/depression, allergic rhinitis. s/p FESS 17 who comes to clinic today for follow up.  Patient was hospitalized in May for acute pancreatitis which was felt to be secondary to Lisinopril. Pt states she has continued with pain although it is relieved with acetaminophen.  Her lisinopril was stopped she was started on Procardia.  She states she has been having difficulty trying to lose weight still despite multiple different diets and exercise programs.  She is awaiting a call from bariatric surgery center.  She is states her blood pressure has been running pretty well at home.  She also states her migraines have been worse since discharge from hospital and she has run out of her sumatriptan.  She continues to follow with outside psychiatrist.  She denies chest pain, shortness a breath, palpitations, dizziness, lightheadedness, dysuria, hematuria, melena, hematochezia.    Review of Systems:  12 point review of symptoms negative unless otherwise stated above    PAST HISTORY:     Past Medical History:   Diagnosis Date    Anxiety     Depression     Hypertension         Past Surgical History:   Procedure Laterality Date     SECTION      CHOLECYSTECTOMY      HYSTERECTOMY         Family History   Problem Relation Age of Onset    Hypertension Mother     Hypertension Father     Heart disease Father        Social History     Socioeconomic History    Marital status: Single    Number of children: 2   Tobacco Use    Smoking status: Never Smoker    Smokeless tobacco: Never Used   Substance and Sexual Activity    Alcohol use: Not  Currently    Drug use: Never    Sexual activity: Yes   Social History Narrative    ** Merged History Encounter **          Social Determinants of Health     Financial Resource Strain: Medium Risk    Difficulty of Paying Living Expenses: Somewhat hard   Food Insecurity: No Food Insecurity    Worried About Running Out of Food in the Last Year: Never true    Ran Out of Food in the Last Year: Never true   Transportation Needs: No Transportation Needs    Lack of Transportation (Medical): No    Lack of Transportation (Non-Medical): No   Physical Activity: Sufficiently Active    Days of Exercise per Week: 3 days    Minutes of Exercise per Session: 60 min   Stress: Stress Concern Present    Feeling of Stress : Very much   Social Connections: Moderately Integrated    Frequency of Communication with Friends and Family: More than three times a week    Frequency of Social Gatherings with Friends and Family: Three times a week    Attends Bahai Services: More than 4 times per year    Attends Club or Organization Meetings: More than 4 times per year    Marital Status: Never    Housing Stability: Unknown    Unable to Pay for Housing in the Last Year: No    Unstable Housing in the Last Year: No       MEDICATIONS:     Current Outpatient Medications   Medication Instructions    ALPRAZolam (XANAX) 2 mg, Oral, 3 times daily    azelastine (ASTELIN) 137 mcg (0.1 %) nasal spray 1 spray, Nasal, 2 times daily    doxepin (SINEQUAN) 100 mg, Oral, Nightly    ergocalciferol (ERGOCALCIFEROL) 50,000 Units, Oral, Every 7 days    eszopiclone (LUNESTA) 3 mg, Oral, Nightly    fluticasone propionate (FLONASE) 50 mcg/actuation nasal spray 1 spray, Each Nostril, 2 times daily    levocetirizine (XYZAL) 5 mg, Oral, Nightly    lisinopriL-hydrochlorothiazide (PRINZIDE,ZESTORETIC) 20-12.5 mg per tablet 1 tablet, Oral, Daily    LORazepam (ATIVAN) 2 mg, Oral, Every 6 hours PRN    medroxyPROGESTERone (PROVERA) 10 mg, Oral,  "Daily    NIFEdipine (PROCARDIA-XL) 90 mg, Oral, Daily    oxyCODONE (ROXICODONE) 5 mg, Oral, Every 6 hours PRN    pantoprazole (PROTONIX) 40 mg, Oral, Every morning    senna (SENOKOT) 34.4 mg, Oral, Nightly PRN    sumatriptan (IMITREX) 100 mg, Oral, Every 12 hours PRN    venlafaxine (EFFEXOR-XR) 150 mg, Oral, Daily    zolpidem (AMBIEN) 10 mg, Oral, Nightly PRN        OBJECTIVE:   Vital Signs:  Vitals:    06/21/22 0744   BP: 126/89   Resp: 20   Temp: 98.1 °F (36.7 °C)   SpO2: 98%   Weight: 134 kg (295 lb 6.4 oz)   Height: 5' 6.93" (1.7 m)        Physical Exam:  General: well-developed well-nourished in no acute distress  Eye: PERRLA, EOMI, clear conjunctiva, eyelids normal  HENT: Head-normocephalic and atraumatic  Neck: full range of motion, no thyromegaly or lymphadenopathy, trachea midline, supple, no palpable thyroid nodules  Respiratory: clear to auscultation bilaterally without wheezes, rales, rhonchi  Cardiovascular: regular rate and rhythm without murmurs.  No gallops or rubs no JVD.  Capillary refill within normal limits.  Gastrointestinal: soft, non-tender, non-distended with normal bowel sounds, without masses to palpation  Genitourinary: no CVA tenderness to palpation  Musculoskeletal: full range of motion of all extremities/spine without limitation or discomfort  Integumentary: no rashes or skin lesions present  Neurologic: no signs of peripheral neurological deficit, motor/sensory function intact  Psychiatric:  alert and oriented, cognitive function intact, cooperative with exam, good eye contact, judgement and insight intact, mood and affect full range.      Laboratory  Lab Results   Component Value Date     06/16/2022    K 3.6 06/16/2022    CO2 29 06/16/2022    BUN 6.9 (L) 06/16/2022    CREATININE 0.71 06/16/2022    CALCIUM 9.1 06/16/2022    BILIDIR 0.1 02/28/2022    IBILI 0.30 02/28/2022    ALKPHOS 80 06/16/2022    AST 13 06/16/2022    ALT 12 06/16/2022        Lab Results   Component Value " Date    WBC 6.5 06/16/2022    RBC 4.81 06/16/2022    HGB 12.4 06/16/2022    HCT 36.9 (L) 06/16/2022    MCV 76.7 (L) 06/16/2022    MCH 25.8 (L) 06/16/2022    MCHC 33.6 06/16/2022    RDW 17.1 (H) 06/16/2022     06/16/2022    MPV 10.5 06/16/2022        Diagnostic Results:  CT Abdomen Pelvis  Without Contrast    Result Date: 5/22/2022  EXAMINATION:  CT ABDOMEN PELVIS WITHOUT CONTRAST    CLINICAL HISTORY:  ; Bowel obstruction suspected;Abdominal pain, acute, nonlocalized;    TECHNIQUE:  Helical-acquisition CT images were obtained without the intravenous administration of iodinated contrast media. Enteric contrast was not utilized.  Multiplanar reformats were accomplished by a CT technologist at a separate workstation and pushed to PACS for physician review.    Automated tube current modulation, weight-based exposure dosing, and/or iterative reconstruction technique utilized to reach lowest reasonably achievable exposure rate.    DLP: 1178.2 mGy*cm    COMPARISON:  None available at the time of initial interpretation.    FINDINGS:  Images were reviewed in soft tissue, lung, and bone windows.    Exam quality: adequate for evaluation    Lines/tubes: none visualized    Heart: No acute or focal abnormality.    Lung bases: No acute or focal abnormality.    Esophagus: Visualized lower portion is unremarkable.    Hepatobiliary: No acute or focal liver abnormality. The gallbladder is surgically absent.  No convincing obstructive biliary process.    Pancreas: Edematous pancreas with surrounding peripancreatic fat stranding and fluid in the right anterior pararenal spaces. This is consistent with acute pancreatitis. No organized acute peripancreatic collection or pseudocyst is identified.  There is no dilatation of the pancreatic duct.    Spleen: No acute or focal abnormality.    Adrenal Glands: No acute or focal abnormality.    Genitourinary: No solid lesion renal lesion, complex cyst, radiopaque stone, or evidence of distal  obstructive uropathy. No focal bladder wall thickening or convincing intraluminal abnormality. Prostate is unremarkable for CT assessment.    Stomach/Bowel: No evidence of gastric outlet or small bowel obstruction. No acute inflammatory process or convincing focal lesion.    Peritoneal/Extraperitoneal Spaces: No free fluid or air. No drainable collections.    Vasculature: No acute or focal abnormality.    Lymph Nodes: No pathologic enlargement or necrotic process.    Musculoskeletal: No acute process or suspicious focal abnormality.         No results found in the last 24 hours.     ASSESSMENT & PLAN:   Migraines  -Continues following with [facility] neurology  -Continue sumatriptan 100 mg daily PRN for migraine    HTN  -/89 today  -Continue Nifedipine 90mg daily    Allergic rhinosinusitis  -Not discussed this visit, continue current plan as needed  --Continue Xyzal    GERD  -Continue Nexium  -EGD performed after results of KUB. GI recommends continue PPI and assess response to dilatation. Have encouraged patient to eat with small bites and chew well and make sure she takes sips of liquid with dry foods. Likely cause for symptoms are purely anatomical.    Iron deficiency anemia  AUB s/p hysterectomy  -Continue senna  -Continue iron supplementation every other day  -Continue to follow with [facility] gynecology clinic    Anxiety/depression  -Continues on Xanax, Lunesta, venlafaxine per outside psychiatrist at [facility]    LAURA on CPAP  -Following with [place] [facility] [place]. Burger  -Using and benefitting from CPAP machine, continue. Currently awaiting new CPAP machine as hers was recalled    Morbid obesity  -Education and counseling of diet and exercise  -Patient has had difficulty losing weight despite being on appropriate diet and exercise regimens for 6+ months. She is currently doing a vegan diet with her son however has not had any results other than losing a couple pounds here and there. She also currently  walks 3 miles a day with no improvement in weight loss.  -Awaiting referral from bariatric surgery evaluation as it was sent last visit      Health Maintenance/ Wellness  Pneumococcal vaccine: UTD  TDAP: UTD  Influenza vaccine: UTD  Shingrix vaccine: n/a  AAA U/S screening:n/a  Bone Density Screening: n/a  Mammogram: Last done on Oct 2020, results wnl, will send referral for mammogram  Pap smear: Hysterectomy in October 2021  Screening colonoscopy:n/a  Lung Cancer Screening: n/a  Hepatitis Panel: Negative in March 2021      Counseling:  - Patient instructed to limit alcohol use  - Patient counselled on smoking cessation  - Educated on diet (portion control) and exercise (at least 30 minutes per day)  - Relevant educational materials provided    Labs ordered: CMP, CBC, Vit D  Imaging: CT head without contrast  Medications: reconciled, discussed and refills given.  RTC in 4 months    Discussed all treatments indicated above in great detail with patient while also discussing the possible side effects, risks and benefits of each medication; patient was able to repeat these explanations back to me, voiced understanding and agreed to the above treatment plan.    Darius Cifuentes MD  06/20/2022, 11:21 AM

## 2022-06-21 ENCOUNTER — OFFICE VISIT (OUTPATIENT)
Dept: INTERNAL MEDICINE | Facility: CLINIC | Age: 42
End: 2022-06-21
Payer: MEDICAID

## 2022-06-21 VITALS
HEIGHT: 67 IN | RESPIRATION RATE: 20 BRPM | DIASTOLIC BLOOD PRESSURE: 89 MMHG | WEIGHT: 293 LBS | OXYGEN SATURATION: 98 % | SYSTOLIC BLOOD PRESSURE: 126 MMHG | BODY MASS INDEX: 45.99 KG/M2 | TEMPERATURE: 98 F

## 2022-06-21 DIAGNOSIS — G47.33 OBSTRUCTIVE SLEEP APNEA SYNDROME: ICD-10-CM

## 2022-06-21 DIAGNOSIS — E66.01 MORBID OBESITY: ICD-10-CM

## 2022-06-21 DIAGNOSIS — G43.009 MIGRAINE WITHOUT AURA AND RESPONSIVE TO TREATMENT: ICD-10-CM

## 2022-06-21 DIAGNOSIS — G47.00 INSOMNIA, UNSPECIFIED TYPE: ICD-10-CM

## 2022-06-21 DIAGNOSIS — I10 PRIMARY HYPERTENSION: Primary | ICD-10-CM

## 2022-06-21 DIAGNOSIS — F41.9 ANXIETY: Chronic | ICD-10-CM

## 2022-06-21 DIAGNOSIS — K85.80 OTHER ACUTE PANCREATITIS WITHOUT INFECTION OR NECROSIS: ICD-10-CM

## 2022-06-21 DIAGNOSIS — F33.9 EPISODE OF RECURRENT MAJOR DEPRESSIVE DISORDER, UNSPECIFIED DEPRESSION EPISODE SEVERITY: Chronic | ICD-10-CM

## 2022-06-21 PROBLEM — K85.90 ACUTE PANCREATITIS: Status: ACTIVE | Noted: 2022-05-22

## 2022-06-21 PROCEDURE — 99214 OFFICE O/P EST MOD 30 MIN: CPT | Mod: PBBFAC

## 2022-06-21 RX ORDER — SUMATRIPTAN SUCCINATE 100 MG/1
100 TABLET ORAL EVERY 12 HOURS PRN
Qty: 20 TABLET | Refills: 5 | Status: SHIPPED | OUTPATIENT
Start: 2022-06-21 | End: 2022-08-22 | Stop reason: SDUPTHER

## 2022-06-21 NOTE — PROGRESS NOTES
I have reviewed and concur with the resident's history, physical, assessment, and plan.  I have discussed with him all issues related to the diagnosis, workup and treatment plan.Care provided as reasonable and necessary.    Isai Do MD  [facility]

## 2022-06-28 ENCOUNTER — HOSPITAL ENCOUNTER (EMERGENCY)
Facility: HOSPITAL | Age: 42
Discharge: HOME | End: 2022-06-28
Attending: SPECIALIST
Payer: MEDICAID

## 2022-06-28 VITALS
WEIGHT: 293 LBS | BODY MASS INDEX: 51.01 KG/M2 | RESPIRATION RATE: 20 BRPM | DIASTOLIC BLOOD PRESSURE: 74 MMHG | TEMPERATURE: 98 F | OXYGEN SATURATION: 99 % | SYSTOLIC BLOOD PRESSURE: 105 MMHG | HEART RATE: 108 BPM

## 2022-06-28 DIAGNOSIS — I10 PRIMARY HYPERTENSION: Primary | ICD-10-CM

## 2022-06-28 DIAGNOSIS — A08.4 VIRAL GASTROENTERITIS: Primary | ICD-10-CM

## 2022-06-28 LAB
ABS NEUT CALC (OHS): 24.93 X10(3)/MCL (ref 2.1–9.2)
ALBUMIN SERPL-MCNC: 4.2 GM/DL (ref 3.5–5)
ALBUMIN/GLOB SERPL: 0.8 RATIO (ref 1.1–2)
ALP SERPL-CCNC: 118 UNIT/L (ref 40–150)
ALT SERPL-CCNC: 16 UNIT/L (ref 0–55)
AST SERPL-CCNC: 22 UNIT/L (ref 5–34)
BILIRUBIN DIRECT+TOT PNL SERPL-MCNC: 0.6 MG/DL
BUN SERPL-MCNC: 12.8 MG/DL (ref 7–18.7)
CALCIUM SERPL-MCNC: 9.8 MG/DL (ref 8.4–10.2)
CHLORIDE SERPL-SCNC: 105 MMOL/L (ref 98–107)
CO2 SERPL-SCNC: 20 MMOL/L (ref 22–29)
CREAT SERPL-MCNC: 1 MG/DL (ref 0.55–1.02)
ERYTHROCYTE [DISTWIDTH] IN BLOOD BY AUTOMATED COUNT: 18.4 % (ref 11.5–17)
ETHANOL SERPL-MCNC: <10 MG/DL
FLUAV AG UPPER RESP QL IA.RAPID: NOT DETECTED
FLUBV AG UPPER RESP QL IA.RAPID: NOT DETECTED
GLOBULIN SER-MCNC: 5.1 GM/DL (ref 2.4–3.5)
GLUCOSE SERPL-MCNC: 129 MG/DL (ref 74–100)
HCT VFR BLD AUTO: 51.7 % (ref 37–47)
HGB BLD-MCNC: 16.1 GM/DL (ref 12–16)
IMM GRANULOCYTES # BLD AUTO: 0.06 X10(3)/MCL (ref 0–0.02)
IMM GRANULOCYTES NFR BLD AUTO: 0.2 % (ref 0–0.43)
LIPASE SERPL-CCNC: 28 U/L
LYMPHOCYTES NFR BLD MANUAL: 1.37 X10(3)/MCL
LYMPHOCYTES NFR BLD MANUAL: 5 % (ref 13–40)
MCH RBC QN AUTO: 24.3 PG (ref 27–31)
MCHC RBC AUTO-ENTMCNC: 31.1 MG/DL (ref 33–36)
MCV RBC AUTO: 78 FL (ref 80–94)
MONOCYTES NFR BLD MANUAL: 1.1 X10(3)/MCL (ref 0.1–1.3)
MONOCYTES NFR BLD MANUAL: 4 % (ref 2–11)
NEUTROPHILS NFR BLD MANUAL: 91 % (ref 47–80)
PLATELET # BLD AUTO: 315 X10(3)/MCL (ref 130–400)
PLATELET # BLD EST: ADEQUATE 10*3/UL
PMV BLD AUTO: 10.6 FL (ref 9.4–12.4)
POTASSIUM SERPL-SCNC: 3.7 MMOL/L (ref 3.5–5.1)
PROT SERPL-MCNC: 9.3 GM/DL (ref 6.4–8.3)
RBC # BLD AUTO: 6.63 X10(6)/MCL (ref 4.2–5.4)
RSV A 5' UTR RNA NPH QL NAA+PROBE: NOT DETECTED
SARS-COV-2 RNA RESP QL NAA+PROBE: NOT DETECTED
SODIUM SERPL-SCNC: 142 MMOL/L (ref 136–145)
WBC # SPEC AUTO: 27.4 X10(3)/MCL (ref 4.5–11.5)

## 2022-06-28 PROCEDURE — 87636 SARSCOV2 & INF A&B AMP PRB: CPT | Performed by: SPECIALIST

## 2022-06-28 PROCEDURE — 36415 COLL VENOUS BLD VENIPUNCTURE: CPT | Performed by: SPECIALIST

## 2022-06-28 PROCEDURE — 80053 COMPREHEN METABOLIC PANEL: CPT | Performed by: SPECIALIST

## 2022-06-28 PROCEDURE — 82077 ASSAY SPEC XCP UR&BREATH IA: CPT | Performed by: SPECIALIST

## 2022-06-28 PROCEDURE — 96361 HYDRATE IV INFUSION ADD-ON: CPT

## 2022-06-28 PROCEDURE — 83690 ASSAY OF LIPASE: CPT | Performed by: SPECIALIST

## 2022-06-28 PROCEDURE — 85027 COMPLETE CBC AUTOMATED: CPT | Performed by: SPECIALIST

## 2022-06-28 PROCEDURE — 63600175 PHARM REV CODE 636 W HCPCS: Performed by: SPECIALIST

## 2022-06-28 PROCEDURE — 99285 EMERGENCY DEPT VISIT HI MDM: CPT | Mod: 25

## 2022-06-28 PROCEDURE — 25000003 PHARM REV CODE 250: Performed by: SPECIALIST

## 2022-06-28 PROCEDURE — 96374 THER/PROPH/DIAG INJ IV PUSH: CPT

## 2022-06-28 RX ORDER — HYOSCYAMINE SULFATE 0.12 MG/1
0.12 TABLET SUBLINGUAL
Status: COMPLETED | OUTPATIENT
Start: 2022-06-28 | End: 2022-06-28

## 2022-06-28 RX ORDER — HYOSCYAMINE SULFATE 0.125 MG
125 TABLET ORAL EVERY 4 HOURS PRN
Qty: 20 TABLET | Refills: 0 | Status: SHIPPED | OUTPATIENT
Start: 2022-06-28 | End: 2022-08-22

## 2022-06-28 RX ORDER — LISINOPRIL AND HYDROCHLOROTHIAZIDE 12.5; 2 MG/1; MG/1
1 TABLET ORAL DAILY
Qty: 30 TABLET | Refills: 2 | Status: SHIPPED | OUTPATIENT
Start: 2022-06-28 | End: 2022-08-22

## 2022-06-28 RX ORDER — DIPHENOXYLATE HYDROCHLORIDE AND ATROPINE SULFATE 2.5; .025 MG/1; MG/1
2 TABLET ORAL
Status: COMPLETED | OUTPATIENT
Start: 2022-06-28 | End: 2022-06-28

## 2022-06-28 RX ORDER — KETOROLAC TROMETHAMINE 30 MG/ML
30 INJECTION, SOLUTION INTRAMUSCULAR; INTRAVENOUS
Status: COMPLETED | OUTPATIENT
Start: 2022-06-28 | End: 2022-06-28

## 2022-06-28 RX ORDER — LISINOPRIL AND HYDROCHLOROTHIAZIDE 12.5; 2 MG/1; MG/1
1 TABLET ORAL DAILY
COMMUNITY
Start: 2022-05-23 | End: 2022-06-28 | Stop reason: SDUPTHER

## 2022-06-28 RX ORDER — DIPHENOXYLATE HYDROCHLORIDE AND ATROPINE SULFATE 2.5; .025 MG/1; MG/1
1 TABLET ORAL 4 TIMES DAILY PRN
Qty: 12 TABLET | Refills: 0 | Status: SHIPPED | OUTPATIENT
Start: 2022-06-28 | End: 2022-07-08

## 2022-06-28 RX ADMIN — DIPHENOXYLATE HYDROCHLORIDE AND ATROPINE SULFATE 2 TABLET: 2.5; .025 TABLET ORAL at 04:06

## 2022-06-28 RX ADMIN — SODIUM CHLORIDE 1000 ML: 9 INJECTION, SOLUTION INTRAVENOUS at 03:06

## 2022-06-28 RX ADMIN — HYOSCYAMINE SULFATE 0.12 MG: 0.12 TABLET ORAL at 04:06

## 2022-06-28 RX ADMIN — KETOROLAC TROMETHAMINE 30 MG: 30 INJECTION, SOLUTION INTRAMUSCULAR at 04:06

## 2022-06-28 NOTE — ED NOTES
Pt reports having upper abd pain and nv for the past hour. Pt reports having hx of pancreatitis. Pt describes pain that feels consistent with her last visit. Pt was given 4 mg of zofran in route to ed by ems. Pt has family at bedside. No distress noted upon arrival to ed. Pt given urine specimen for collection.

## 2022-06-28 NOTE — ED NOTES
Pt requesting meds for elevated anxiety. Pt appears drowsy and sedated at this time. Pt has no signs of anxiety. She states that's he is having racing thoughts. md notified.

## 2022-06-28 NOTE — ED NOTES
Pt up to bedside commode for ua. Pt reports only able to have a bowel movement. Unable to collect urine specimen.

## 2022-06-28 NOTE — ED NOTES
Pt's labs are elevated and is tachycardic, showing signs of sepsis. MD notified. Awaiting new orders.

## 2022-06-28 NOTE — ED PROVIDER NOTES
Encounter Date: 2022       History     Chief Complaint   Patient presents with    Abdominal Pain     Abd pain x1 hr with N/V. Hx of pancreatitis. Groaning and guarding between answers.      Patient presents with EMS complaining of abdominal pain for about an hour with nausea vomiting; patient diagnosed with pancreatitis May 22, 2022 of unknown origin but thought to be due to a medication lisinopril; patient had negative workup for choledocholithiasis; patient has had a cholecystectomy in the past; she denies fever or chills; she later reported many loose BMs and generalized abdominal cramping    The history is provided by the patient and the EMS personnel.     Review of patient's allergies indicates:  No Known Allergies  Past Medical History:   Diagnosis Date    Anxiety     Depression     Hypertension      Past Surgical History:   Procedure Laterality Date     SECTION      CHOLECYSTECTOMY      HYSTERECTOMY       Family History   Problem Relation Age of Onset    Hypertension Mother     Hypertension Father     Heart disease Father      Social History     Tobacco Use    Smoking status: Never Smoker    Smokeless tobacco: Never Used   Substance Use Topics    Alcohol use: Not Currently    Drug use: Never     Review of Systems   Constitutional: Negative.    HENT: Negative.    Respiratory: Negative.    Cardiovascular: Negative.    Gastrointestinal: Positive for abdominal pain and nausea.   Musculoskeletal: Negative.    Skin: Negative.    Neurological: Negative.    All other systems reviewed and are negative.      Physical Exam     Initial Vitals   BP Pulse Resp Temp SpO2   22 0039 22 0038 22 0039 22 0039 22 0039   (!) 132/108 107 20 97.9 °F (36.6 °C) 95 %      MAP       --                Physical Exam    Nursing note and vitals reviewed.  Constitutional: She appears well-developed and well-nourished.   HENT:   Head: Normocephalic and atraumatic.   Eyes: EOM are  normal. Pupils are equal, round, and reactive to light.   Neck: Neck supple.   Normal range of motion.  Cardiovascular: Normal rate, regular rhythm, normal heart sounds and intact distal pulses.   Pulmonary/Chest: Breath sounds normal.   Abdominal: Abdomen is soft. Bowel sounds are normal. There is abdominal tenderness (Midepigastric abdominal pain to palpation).   Musculoskeletal:         General: Normal range of motion.      Cervical back: Normal range of motion and neck supple.     Neurological: She is alert and oriented to person, place, and time. She has normal strength.   Skin: Skin is warm and dry.         ED Course   Procedures  Labs Reviewed   COMPREHENSIVE METABOLIC PANEL - Abnormal; Notable for the following components:       Result Value    Carbon Dioxide 20 (*)     Glucose Level 129 (*)     Protein Total 9.3 (*)     Globulin 5.1 (*)     Albumin/Globulin Ratio 0.8 (*)     All other components within normal limits   CBC WITH DIFFERENTIAL - Abnormal; Notable for the following components:    WBC 27.4 (*)     RBC 6.63 (*)     Hgb 16.1 (*)     Hct 51.7 (*)     MCV 78.0 (*)     MCH 24.3 (*)     MCHC 31.1 (*)     RDW 18.4 (*)     IG# 0.06 (*)     All other components within normal limits   MANUAL DIFFERENTIAL - Abnormal; Notable for the following components:    Neut Man 91 (*)     Lymph Man 5 (*)     Abs Neut calc 24.934 (*)     Abs Lymp 1.37 (*)     All other components within normal limits   LIPASE - Normal   ALCOHOL,MEDICAL (ETHANOL) - Normal   COVID/RSV/FLU A&B PCR - Normal   CBC W/ AUTO DIFFERENTIAL    Narrative:     The following orders were created for panel order CBC auto differential.  Procedure                               Abnormality         Status                     ---------                               -----------         ------                     CBC with Differential[[id number]]        Abnormal            Final result               Manual Differential[[id number]]          Abnormal             Final result                 Please view results for these tests on the individual orders.   URINALYSIS, REFLEX TO URINE CULTURE   DRUG SCREEN, URINE (BEAKER)          Imaging Results          X-Ray Chest AP Portable (Preliminary result)  Result time 06/28/22 04:09:29    ED Interpretation by Titus Cespedes MD (06/28/22 04:09:29, [facility], Emergency Medicine)    Clear lung fields                             CT Abdomen Pelvis  Without Contrast (Preliminary result)  Result time 06/28/22 03:08:41    Preliminary result by Jerome Mann Jr., MD (06/28/22 03:08:41)                 Narrative:    START OF REPORT:  Technique: CT of the abdomen and pelvis was performed with axial images as well as sagittal and coronal reconstruction images without intravenous contrast.    Comparison: None available.    Clinical History: Abd pain x1 hr with N/V. Hysterectomy.    Dosage Information: Automated Exposure Control was utilized.    Findings:  Thorax:  Lungs: There is mild nonspecific dependent change at the lung bases.  Pleura: No effusions or thickening.  Heart: The heart size is within normal limits.  Abdomen:  Abdominal Wall: No abdominal wall pathology is seen.  Liver: Mild fatty infiltration of the liver is present.  Biliary System: No extrahepatic biliary duct dilatation is seen.  Gallbladder: Surgical clips are seen in the gallbladder fossa which may reflect prior cholecystectomy.  Pancreas: There is mild peripancreatic fat stranding at the level of the uncinate process of the pancreas (series 3, image 49). An element of pancreatitis is a concern.  Spleen: The spleen appears unremarkable.  Adrenals: The adrenal glands appear unremarkable.  Kidneys: The kidneys appear unremarkable with no stones cysts masses or hydronephrosis.  Aorta: The abdominal aorta appears unremarkable.  IVC: Unremarkable.  Bowel:  Esophagus: The visualized esophagus appears unremarkable.  Stomach: The stomach appears  unremarkable.  Duodenum: Unremarkable appearing duodenum.  Small Bowel: There is thickening of the small bowel in the mid and upper abdomen with subtle mesenteric vascular congestion. An element of enteritis is a concern.  Colon: Nondistended.  Appendix: The appendix appears unremarkable.  Peritoneum: No intraperitoneal free air or ascites is seen.    Pelvis:  Bladder: The bladder is nondistended and cannot be definitively evaluated.  Female:  Uterus: The uterus is not identified consistent with history of hysterectomy.  Ovaries: No adnexal masses are seen.    Bony structures:  Dorsal Spine: There is mild spondylosis of the visualized dorsal spine.      Impression:  1. There is thickening of the small bowel in the mid and upper abdomen with subtle mesenteric vascular congestion. An element of enteritis is a concern.  2. There is mild peripancreatic fat stranding at the level of the uncinate process of the pancreas (series 3, image 49). An element of pancreatitis is a concern.  3. Details and other findings as discussed above.                                   Medications   sodium chloride 0.9% bolus 1,000 mL (0 mLs Intravenous Stopped 6/28/22 0459)   ketorolac injection 30 mg (30 mg Intravenous Given 6/28/22 0449)   diphenoxylate-atropine 2.5-0.025 mg per tablet 2 tablet (2 tablets Oral Given 6/28/22 0449)   hyoscyamine ODT 0.125 mg (0.125 mg Oral Given 6/28/22 0449)                 ED Course as of 06/28/22 0533   Tue Jun 28, 2022   0338 CT Abdomen Pelvis  Without Contrast [DD]   0411 MCH(!): 24.3 [DD]      ED Course User Index  [DD] Titus Cespedes MD           Patient Vitals for the past 24 hrs:   BP Temp Temp src Pulse Resp SpO2 Weight   06/28/22 0523 105/74 -- Oral 108 20 99 % --   06/28/22 0457 (!) 145/93 98.1 °F (36.7 °C) Oral (!) 116 18 98 % --   06/28/22 0336 128/85 -- -- (!) 120 20 97 % --   06/28/22 0322 115/85 -- -- (!) 118 18 95 % --   06/28/22 0254 (!) 131/94 98.3 °F (36.8 °C) Oral (!) 122 20 95 % --    06/28/22 0039 (!) 132/108 97.9 °F (36.6 °C) -- 101 20 95 % (!) 147.4 kg (325 lb)   06/28/22 0038 -- -- -- 107 -- -- --     Much improved  Clinical Impression:   Final diagnoses:  [A08.4] Viral gastroenteritis (Primary)          ED Disposition Condition    Discharge Stable        ED Prescriptions     Medication Sig Dispense Start Date End Date Auth. Provider    diphenoxylate-atropine 2.5-0.025 mg (LOMOTIL) 2.5-0.025 mg per tablet Take 1 tablet by mouth 4 (four) times daily as needed for Diarrhea. 12 tablet 6/28/2022 7/8/2022 Titus Cespedes MD    hyoscyamine (ANASPAZ,LEVSIN) 0.125 mg Tab Take 1 tablet (125 mcg total) by mouth every 4 (four) hours as needed. 20 tablet 6/28/2022 7/28/2022 Titus Cespedes MD        Follow-up Information     Follow up With Specialties Details Why Contact Info    Darius Cifuentes MD Family Medicine In 1 day As needed [address] [facility] [address] [phone]             Titus Cespedes MD  06/28/22 2001

## 2022-06-30 ENCOUNTER — OFFICE VISIT (OUTPATIENT)
Dept: URGENT CARE | Facility: CLINIC | Age: 42
End: 2022-06-30
Payer: MEDICAID

## 2022-06-30 VITALS
HEIGHT: 67 IN | SYSTOLIC BLOOD PRESSURE: 134 MMHG | HEART RATE: 102 BPM | WEIGHT: 287.19 LBS | BODY MASS INDEX: 45.08 KG/M2 | DIASTOLIC BLOOD PRESSURE: 88 MMHG | TEMPERATURE: 98 F | RESPIRATION RATE: 20 BRPM | OXYGEN SATURATION: 100 %

## 2022-06-30 DIAGNOSIS — R39.9 UTI SYMPTOMS: ICD-10-CM

## 2022-06-30 DIAGNOSIS — N89.8 VAGINA ITCHING: ICD-10-CM

## 2022-06-30 DIAGNOSIS — N39.0 ACUTE UTI: Primary | ICD-10-CM

## 2022-06-30 LAB
BILIRUB UR QL STRIP: POSITIVE
CLUE CELLS VAG QL WET PREP: ABNORMAL
GLUCOSE UR QL STRIP: NEGATIVE
KETONES UR QL STRIP: POSITIVE
LEUKOCYTE ESTERASE UR QL STRIP: POSITIVE
PH, POC UA: ABNORMAL
POC BLOOD, URINE: POSITIVE
POC NITRATES, URINE: NEGATIVE
PROT UR QL STRIP: POSITIVE
SP GR UR STRIP: 6 (ref 1–1.03)
T VAGINALIS VAG QL WET PREP: ABNORMAL
UROBILINOGEN UR STRIP-ACNC: 1 (ref 0.1–1.1)
WBC #/AREA VAG WET PREP: ABNORMAL
YEAST SPEC QL WET PREP: ABNORMAL

## 2022-06-30 PROCEDURE — 99213 PR OFFICE/OUTPT VISIT, EST, LEVL III, 20-29 MIN: ICD-10-PCS | Mod: S$PBB,,, | Performed by: NURSE PRACTITIONER

## 2022-06-30 PROCEDURE — 99215 OFFICE O/P EST HI 40 MIN: CPT | Mod: PBBFAC | Performed by: NURSE PRACTITIONER

## 2022-06-30 PROCEDURE — 81003 URINALYSIS AUTO W/O SCOPE: CPT | Mod: PBBFAC | Performed by: NURSE PRACTITIONER

## 2022-06-30 PROCEDURE — 99213 OFFICE O/P EST LOW 20 MIN: CPT | Mod: S$PBB,,, | Performed by: NURSE PRACTITIONER

## 2022-06-30 PROCEDURE — 87210 SMEAR WET MOUNT SALINE/INK: CPT | Performed by: NURSE PRACTITIONER

## 2022-06-30 RX ORDER — NITROFURANTOIN 25; 75 MG/1; MG/1
100 CAPSULE ORAL 2 TIMES DAILY
Qty: 14 CAPSULE | Refills: 0 | Status: SHIPPED | OUTPATIENT
Start: 2022-06-30 | End: 2022-06-30

## 2022-06-30 RX ORDER — FLUCONAZOLE 150 MG/1
150 TABLET ORAL
Qty: 2 TABLET | Refills: 0 | Status: SHIPPED | OUTPATIENT
Start: 2022-06-30 | End: 2022-06-30

## 2022-06-30 RX ORDER — NITROFURANTOIN 25; 75 MG/1; MG/1
100 CAPSULE ORAL 2 TIMES DAILY
Qty: 14 CAPSULE | Refills: 0 | Status: SHIPPED | OUTPATIENT
Start: 2022-06-30 | End: 2022-07-07

## 2022-06-30 RX ORDER — TEMAZEPAM 15 MG/1
15 CAPSULE ORAL NIGHTLY PRN
COMMUNITY
Start: 2022-06-20 | End: 2022-12-06

## 2022-06-30 RX ORDER — FLUCONAZOLE 150 MG/1
150 TABLET ORAL
Qty: 2 TABLET | Refills: 0 | Status: SHIPPED | OUTPATIENT
Start: 2022-06-30 | End: 2022-07-06

## 2022-07-01 NOTE — PROGRESS NOTES
"Subjective:       Patient ID: Abdi Combs is a 42 y.o. female.    Vitals:  height is 5' 6.93" (1.7 m) and weight is 130.3 kg (287 lb 3.2 oz). Her temperature is 98.2 °F (36.8 °C). Her blood pressure is 134/88 and her pulse is 102. Her respiration is 20 and oxygen saturation is 100%.     Chief Complaint: Urinary Tract Infection (Discomfort ) and Vaginitis (Patient states she recently in the hospital on abx, think she may have a yeast infection )     Patient is a 42-year-old female, here today for dysuria, vaginal itching over the past few days.      Constitution: Negative.   Neck: neck negative.   Cardiovascular: Negative.    Respiratory: Negative.    Gastrointestinal: Negative.    Genitourinary: Positive for dysuria.       Objective:      Physical Exam   Constitutional: She is oriented to person, place, and time. She appears well-developed.   HENT:   Head: Normocephalic.   Eyes: Conjunctivae and EOM are normal. Pupils are equal, round, and reactive to light.   Neck: Neck supple.   Cardiovascular: Normal rate, regular rhythm and normal heart sounds.   Pulmonary/Chest: Effort normal and breath sounds normal.   Abdominal: Bowel sounds are normal. Soft.   Musculoskeletal: Normal range of motion.         General: Normal range of motion.   Neurological: She is alert and oriented to person, place, and time.   Skin: Skin is warm and dry.   Psychiatric: Her behavior is normal.   Vitals reviewed.        Assessment:       1. Acute UTI    2. UTI symptoms    3. Vagina itching            Office Visit on 06/30/2022   Component Date Value Ref Range Status    POC Blood, Urine 06/30/2022 Positive (A) Negative Final    POC Bilirubin, Urine 06/30/2022 Positive (A) Negative Final    POC Urobilinogen, Urine 06/30/2022 1  0.1 - 1.1 Final    POC Ketones, Urine 06/30/2022 Positive (A) Negative Final    POC Protein, Urine 06/30/2022 Positive (A) Negative Final    POC Nitrates, Urine 06/30/2022 Negative  Negative Final    POC Glucose, " Urine 06/30/2022 Negative  Negative Final    POC Specific Gravity, Urine 06/30/2022 6 (A) 1.003 - 1.029 Final    POC Leukocytes, Urine 06/30/2022 Positive (A) Negative Final           Plan:           medication as ordered.  Wear loose fitting, cotton undergarments. Urine sent for C&S, gonorrhea, chlamydia screening. Wet prep sent to lab. Will notify of abnormal results.  Maintain adequate hydration.  If any flank pain, fever or any symptoms immediately go to the ER.            Acute UTI  -     nitrofurantoin, macrocrystal-monohydrate, (MACROBID) 100 MG capsule; Take 1 capsule (100 mg total) by mouth 2 (two) times daily. for 7 days  Dispense: 14 capsule; Refill: 0  -     Urine culture    UTI symptoms  -     POCT Urinalysis, Dipstick, Automated, W/O Scope  -     Urine culture  -     Chlamydia/GC, PCR    Vagina itching  -     Wet Prep, Genital  -     fluconazole (DIFLUCAN) 150 MG Tab; Take 1 tablet (150 mg total) by mouth Every 3 (three) days. for 6 days  Dispense: 2 tablet; Refill: 0

## 2022-07-19 ENCOUNTER — CLINICAL SUPPORT (OUTPATIENT)
Dept: INTERNAL MEDICINE | Facility: CLINIC | Age: 42
End: 2022-07-19
Payer: MEDICAID

## 2022-07-19 DIAGNOSIS — Z00.00 HEALTHCARE MAINTENANCE: Primary | ICD-10-CM

## 2022-07-19 DIAGNOSIS — K85.00 IDIOPATHIC ACUTE PANCREATITIS WITHOUT INFECTION OR NECROSIS: ICD-10-CM

## 2022-07-19 DIAGNOSIS — K55.1 MESENTERIC ISCHEMIA, CHRONIC: ICD-10-CM

## 2022-07-19 NOTE — PROGRESS NOTES
Established Patient - Audio Only Telehealth Visit     The patient location is: home  The chief complaint leading to consultation is: abdominal pain   Visit type: Virtual visit with audio only (telephone)  Total time spent with patient: 30 mins       The reason for the audio only service rather than synchronous audio and video virtual visit was related to technical difficulties or patient preference/necessity.     Each patient to whom I provide medical services by telemedicine is:  (1) informed of the relationship between the physician and patient and the respective role of any other health care provider with respect to management of the patient; and (2) notified that they may decline to receive medical services by telemedicine and may withdraw from such care at any time. Patient verbally consented to receive this service via voice-only telephone call.           Ms. Abdi Combs is a 42 y.o. female w/ PMH hypertension, GERD, anxiety/depression, allergic rhinitis. s/p FESS 8/11/17 who comes to clinic today for follow up.  Patient has been having nonspecific abdominal pain s/p 1-2 hours after the.  Patient says this pain is chronic and last.  Patient was hospitalized in May and was found to have edematous pancreatitis patient underwent MRCP with no underlying blockage.  Patient's triglyceride and does not have any alcohol use, patient's pancreatitis was then attributed to lisinopril even otherwise noted chronic changes noted.  Patient has been miserable she says she is scared of eating, she says that she has only been eating bananas because she says that it is good for your stomach and causes less pain.  Patient is still having pain.  Patient also says that she also been having some migraines which have been intermittent, she says she does not have any migraines at this time and she has an appointment later this month when Neurology.      Abdominal pain:  With patient's clinical picture and laboratory and imaging thus  far patient more than likely may have an aspect of chronic mesenteric ischemia.  Will order a CT abdomen with contrast to doing if there is any vascular congestion like there is noted in the CT that was performed last month which showed slight mesenteric congestion.  Will get referral for Gastroenterology [facility]  Will set a vasculitis workup along with hypercoagulable workup    Migraines  -Continues following with [facility] neurology      HTN  - continue Procardia    Allergic rhinosinusitis  -Not discussed this visit, continue current plan as needed  --Continue Xyzal    GERD  -Continue Nexium  -EGD performed after results of KUB. GI recommends continue PPI and assess response to dilatation. Have encouraged patient to eat with small bites and chew well and make sure she takes sips of liquid with dry foods. Likely cause for symptoms are purely anatomical.    Iron deficiency anemia  AUB s/p hysterectomy  -Continue senna  -Continue iron supplementation every other day  -Continue to follow with [facility] gynecology clinic. Patient has fibroids    Anxiety/depression  -Continues on Xanax, Lunesta, venlafaxine per outside psychiatrist at [facility]    LAURA on CPAP  -Following with sleep specialist at [facility], Dr. Burger            Health Maintenance/ Wellness  Pneumococcal vaccine: UTD  TDAP: UTD  Influenza vaccine: UTD  Shingrix vaccine: n/a  AAA U/S screening:n/a  Bone Density Screening: n/a  Mammogram: Last done on Oct 2020, results wnl, will send referral for mammogram  Pap smear: Hysterectomy in October 2021  Screening colonoscopy:n/a  Lung Cancer Screening: n/a  Hepatitis Panel: Negative in March 2021            RTC in  3 months        This service was not originating from a related E/M service provided within the previous 7 days nor will  to an E/M service or procedure within the next 24 hours or my soonest available appointment.  Prevailing standard of care was able to be met in this audio-only visit.

## 2022-07-28 ENCOUNTER — HOSPITAL ENCOUNTER (OUTPATIENT)
Dept: RADIOLOGY | Facility: HOSPITAL | Age: 42
Discharge: HOME | End: 2022-07-28
Attending: STUDENT IN AN ORGANIZED HEALTH CARE EDUCATION/TRAINING PROGRAM
Payer: MEDICAID

## 2022-07-28 DIAGNOSIS — K85.00 IDIOPATHIC ACUTE PANCREATITIS WITHOUT INFECTION OR NECROSIS: ICD-10-CM

## 2022-07-28 DIAGNOSIS — Z00.00 HEALTHCARE MAINTENANCE: ICD-10-CM

## 2022-07-28 PROCEDURE — 77067 MAMMO DIGITAL SCREENING BILAT WITH TOMO: ICD-10-PCS | Mod: 26,,, | Performed by: RADIOLOGY

## 2022-07-28 PROCEDURE — 25500020 PHARM REV CODE 255: Performed by: STUDENT IN AN ORGANIZED HEALTH CARE EDUCATION/TRAINING PROGRAM

## 2022-07-28 PROCEDURE — 77067 SCR MAMMO BI INCL CAD: CPT | Mod: 26,,, | Performed by: RADIOLOGY

## 2022-07-28 PROCEDURE — 74160 CT ABDOMEN W/CONTRAST: CPT | Mod: TC

## 2022-07-28 PROCEDURE — 77067 SCR MAMMO BI INCL CAD: CPT | Mod: TC

## 2022-07-28 PROCEDURE — 77063 BREAST TOMOSYNTHESIS BI: CPT | Mod: 26,,, | Performed by: RADIOLOGY

## 2022-07-28 PROCEDURE — 77063 MAMMO DIGITAL SCREENING BILAT WITH TOMO: ICD-10-PCS | Mod: 26,,, | Performed by: RADIOLOGY

## 2022-07-28 RX ADMIN — IOPAMIDOL 100 ML: 755 INJECTION, SOLUTION INTRAVENOUS at 08:07

## 2022-08-09 ENCOUNTER — HOSPITAL ENCOUNTER (EMERGENCY)
Facility: HOSPITAL | Age: 42
Discharge: HOME | End: 2022-08-09
Attending: STUDENT IN AN ORGANIZED HEALTH CARE EDUCATION/TRAINING PROGRAM
Payer: MEDICAID

## 2022-08-09 ENCOUNTER — PATIENT MESSAGE (OUTPATIENT)
Dept: HEPATOLOGY | Facility: HOSPITAL | Age: 42
End: 2022-08-09
Payer: MEDICAID

## 2022-08-09 VITALS
TEMPERATURE: 98 F | HEART RATE: 88 BPM | RESPIRATION RATE: 18 BRPM | BODY MASS INDEX: 43.4 KG/M2 | SYSTOLIC BLOOD PRESSURE: 135 MMHG | HEIGHT: 69 IN | DIASTOLIC BLOOD PRESSURE: 84 MMHG | WEIGHT: 293 LBS | OXYGEN SATURATION: 98 %

## 2022-08-09 DIAGNOSIS — L08.9 SKIN INFECTION: ICD-10-CM

## 2022-08-09 DIAGNOSIS — M79.89 LEG SWELLING: Primary | ICD-10-CM

## 2022-08-09 LAB
ALBUMIN SERPL-MCNC: 3.6 GM/DL (ref 3.5–5)
ALBUMIN/GLOB SERPL: 0.8 RATIO (ref 1.1–2)
ALP SERPL-CCNC: 75 UNIT/L (ref 40–150)
ALT SERPL-CCNC: 14 UNIT/L (ref 0–55)
AST SERPL-CCNC: 17 UNIT/L (ref 5–34)
BASOPHILS # BLD AUTO: 0.02 X10(3)/MCL (ref 0–0.2)
BASOPHILS NFR BLD AUTO: 0.2 %
BILIRUBIN DIRECT+TOT PNL SERPL-MCNC: 0.3 MG/DL
BNP BLD-MCNC: <10 PG/ML
BUN SERPL-MCNC: 10.6 MG/DL (ref 7–18.7)
CALCIUM SERPL-MCNC: 9.8 MG/DL (ref 8.4–10.2)
CHLORIDE SERPL-SCNC: 105 MMOL/L (ref 98–107)
CO2 SERPL-SCNC: 27 MMOL/L (ref 22–29)
CREAT SERPL-MCNC: 0.78 MG/DL (ref 0.55–1.02)
D DIMER PPP IA.FEU-MCNC: 1.08 UG/ML FEU (ref 0–0.5)
EOSINOPHIL # BLD AUTO: 0.07 X10(3)/MCL (ref 0–0.9)
EOSINOPHIL NFR BLD AUTO: 0.8 %
ERYTHROCYTE [DISTWIDTH] IN BLOOD BY AUTOMATED COUNT: 16 % (ref 11.5–17)
GFR SERPLBLD CREATININE-BSD FMLA CKD-EPI: >60 MLS/MIN/1.73/M2
GLOBULIN SER-MCNC: 4.5 GM/DL (ref 2.4–3.5)
GLUCOSE SERPL-MCNC: 98 MG/DL (ref 74–100)
HCT VFR BLD AUTO: 36.1 % (ref 37–47)
HGB BLD-MCNC: 12.3 GM/DL (ref 12–16)
IMM GRANULOCYTES # BLD AUTO: 0.02 X10(3)/MCL (ref 0–0.04)
IMM GRANULOCYTES NFR BLD AUTO: 0.2 %
LYMPHOCYTES # BLD AUTO: 2.11 X10(3)/MCL (ref 0.6–4.6)
LYMPHOCYTES NFR BLD AUTO: 22.6 %
MCH RBC QN AUTO: 25.7 PG (ref 27–31)
MCHC RBC AUTO-ENTMCNC: 34.1 MG/DL (ref 33–36)
MCV RBC AUTO: 75.5 FL (ref 80–94)
MONOCYTES # BLD AUTO: 0.93 X10(3)/MCL (ref 0.1–1.3)
MONOCYTES NFR BLD AUTO: 10 %
NEUTROPHILS # BLD AUTO: 6.2 X10(3)/MCL (ref 2.1–9.2)
NEUTROPHILS NFR BLD AUTO: 66.2 %
NRBC BLD AUTO-RTO: 0 %
PLATELET # BLD AUTO: 326 X10(3)/MCL (ref 130–400)
PMV BLD AUTO: 10.6 FL (ref 7.4–10.4)
POTASSIUM SERPL-SCNC: 3.9 MMOL/L (ref 3.5–5.1)
PROT SERPL-MCNC: 8.1 GM/DL (ref 6.4–8.3)
RBC # BLD AUTO: 4.78 X10(6)/MCL (ref 4.2–5.4)
SODIUM SERPL-SCNC: 140 MMOL/L (ref 136–145)
TROPONIN I SERPL-MCNC: 0.02 NG/ML (ref 0–0.04)
WBC # SPEC AUTO: 9.3 X10(3)/MCL (ref 4.5–11.5)

## 2022-08-09 PROCEDURE — 84484 ASSAY OF TROPONIN QUANT: CPT | Performed by: PHYSICIAN ASSISTANT

## 2022-08-09 PROCEDURE — 85379 FIBRIN DEGRADATION QUANT: CPT | Performed by: PHYSICIAN ASSISTANT

## 2022-08-09 PROCEDURE — 85025 COMPLETE CBC W/AUTO DIFF WBC: CPT | Performed by: PHYSICIAN ASSISTANT

## 2022-08-09 PROCEDURE — 96372 THER/PROPH/DIAG INJ SC/IM: CPT | Performed by: PHYSICIAN ASSISTANT

## 2022-08-09 PROCEDURE — 36415 COLL VENOUS BLD VENIPUNCTURE: CPT | Performed by: PHYSICIAN ASSISTANT

## 2022-08-09 PROCEDURE — 99285 EMERGENCY DEPT VISIT HI MDM: CPT | Mod: 25

## 2022-08-09 PROCEDURE — 83880 ASSAY OF NATRIURETIC PEPTIDE: CPT | Performed by: PHYSICIAN ASSISTANT

## 2022-08-09 PROCEDURE — 80053 COMPREHEN METABOLIC PANEL: CPT | Performed by: PHYSICIAN ASSISTANT

## 2022-08-09 PROCEDURE — 63600175 PHARM REV CODE 636 W HCPCS: Performed by: PHYSICIAN ASSISTANT

## 2022-08-09 PROCEDURE — 93005 ELECTROCARDIOGRAM TRACING: CPT

## 2022-08-09 RX ORDER — IBUPROFEN 800 MG/1
800 TABLET ORAL EVERY 6 HOURS PRN
Qty: 20 TABLET | Refills: 0 | Status: SHIPPED | OUTPATIENT
Start: 2022-08-09 | End: 2022-08-22

## 2022-08-09 RX ORDER — SULFAMETHOXAZOLE AND TRIMETHOPRIM 800; 160 MG/1; MG/1
1 TABLET ORAL 2 TIMES DAILY
Qty: 14 TABLET | Refills: 0 | Status: SHIPPED | OUTPATIENT
Start: 2022-08-09 | End: 2022-08-16

## 2022-08-09 RX ORDER — KETOROLAC TROMETHAMINE 30 MG/ML
30 INJECTION, SOLUTION INTRAMUSCULAR; INTRAVENOUS
Status: COMPLETED | OUTPATIENT
Start: 2022-08-09 | End: 2022-08-09

## 2022-08-09 RX ADMIN — KETOROLAC TROMETHAMINE 30 MG: 30 INJECTION, SOLUTION INTRAMUSCULAR; INTRAVENOUS at 05:08

## 2022-08-09 NOTE — ED PROVIDER NOTES
Encounter Date: 2022       History     Chief Complaint   Patient presents with    Leg Swelling     Pt reports bilateral lower leg swelling secondary to redness to the lateral lower legs x 4 days.      Patient reports bilateral lower leg pain and swelling for the past x4 days with assoc redness to the anterior aspects; pt denies sob/chest pain/fall/injury      Leg Pain   There was no injury mechanism. The incident occurred several days ago. The pain is present in the left leg and right leg. The quality of the pain is described as aching. The pain is at a severity of 4/10. The pain has been constant since onset. Pertinent negatives include no numbness, no inability to bear weight, no loss of motion, no muscle weakness, no loss of sensation and no tingling. She reports no foreign bodies present. The symptoms are aggravated by bearing weight and palpation. She has tried rest for the symptoms. The treatment provided mild relief.     Review of patient's allergies indicates:  No Known Allergies  Past Medical History:   Diagnosis Date    Anxiety     Depression     Hypertension      Past Surgical History:   Procedure Laterality Date     SECTION      CHOLECYSTECTOMY      HYSTERECTOMY       Family History   Problem Relation Age of Onset    Hypertension Mother     Hypertension Father     Heart disease Father      Social History     Tobacco Use    Smoking status: Never Smoker    Smokeless tobacco: Never Used   Substance Use Topics    Alcohol use: Not Currently    Drug use: Never     Review of Systems   Constitutional: Negative for fever.   HENT: Negative for sore throat.    Eyes: Negative.    Respiratory: Negative for shortness of breath.    Cardiovascular: Negative for chest pain.   Gastrointestinal: Negative for nausea.   Genitourinary: Negative for dysuria.   Musculoskeletal: Negative for back pain.   Skin: Negative for rash.   Neurological: Negative for tingling, weakness and numbness.    Hematological: Does not bruise/bleed easily.   Psychiatric/Behavioral: Negative.        Physical Exam     Initial Vitals [08/09/22 1524]   BP Pulse Resp Temp SpO2   130/84 102 18 98.2 °F (36.8 °C) 100 %      MAP       --         Physical Exam    Constitutional: She appears well-developed and well-nourished.   HENT:   Head: Normocephalic and atraumatic.   Eyes: Conjunctivae and EOM are normal. Pupils are equal, round, and reactive to light.   Neck: Neck supple.   Normal range of motion.  Cardiovascular: Normal rate, regular rhythm and normal heart sounds.   Pulmonary/Chest: Breath sounds normal. No respiratory distress. She has no wheezes. She exhibits no tenderness.   Abdominal: Abdomen is soft. Bowel sounds are normal. There is no abdominal tenderness.   Musculoskeletal:         General: Normal range of motion.      Cervical back: Normal range of motion and neck supple.      Right lower leg: Swelling and tenderness present.      Left lower leg: Swelling and tenderness present.        Legs:       Comments: No fluctuance noted; mild erythema to the anterior aspects of bilateral legs, no evidence of bite marks     Neurological: She is alert and oriented to person, place, and time. She displays normal reflexes. No cranial nerve deficit or sensory deficit.   Skin: Skin is warm and dry.   Psychiatric: She has a normal mood and affect. Her behavior is normal. Judgment and thought content normal.         ED Course   Procedures  Labs Reviewed   COMPREHENSIVE METABOLIC PANEL - Abnormal; Notable for the following components:       Result Value    Globulin 4.5 (*)     Albumin/Globulin Ratio 0.8 (*)     All other components within normal limits   D DIMER, QUANTITATIVE - Abnormal; Notable for the following components:    D-Dimer 1.08 (*)     All other components within normal limits   CBC WITH DIFFERENTIAL - Abnormal; Notable for the following components:    Hct 36.1 (*)     MCV 75.5 (*)     MCH 25.7 (*)     MPV 10.6 (*)      All other components within normal limits   B-TYPE NATRIURETIC PEPTIDE - Normal   TROPONIN I - Normal   CBC W/ AUTO DIFFERENTIAL    Narrative:     The following orders were created for panel order CBC auto differential.  Procedure                               Abnormality         Status                     ---------                               -----------         ------                     CBC with Differential[[id number]]        Abnormal            Final result                 Please view results for these tests on the individual orders.   EXTRA TUBES    Narrative:     The following orders were created for panel order EXTRA TUBES.  Procedure                               Abnormality         Status                     ---------                               -----------         ------                     Red Top Hold[[id number]]                                     In process                   Please view results for these tests on the individual orders.   RED TOP HOLD        ECG Results          EKG 12-lead (Final result)  Result time 08/11/22 10:11:25    Final result by Interface, Lab In [facility] (08/11/22 10:11:25)                 Narrative:    Test Reason : M79.89,    Vent. Rate : 083 BPM     Atrial Rate : 083 BPM     P-R Int : 174 ms          QRS Dur : 092 ms      QT Int : 416 ms       P-R-T Axes : 038 -45 040 degrees     QTc Int : 488 ms    Normal sinus rhythm  Left anterior fascicular block  Prolonged QT  Abnormal ECG  When compared with ECG of 22-MAY-2022 00:40,  No significant change was found  Confirmed by Hernandez Love MD ([id number]) on 8/11/2022 10:11:14 AM    Referred By: [email or web address]   SELF           Confirmed By:Hernandez Love MD                            Imaging Results          X-Ray Chest PA And Lateral (Final result)  Result time 08/09/22 18:45:56    Final result by Shiva Salazar MD (08/09/22 18:45:56)                 Impression:      No acute process      Electronically signed by: Shiva Salazar  MD  Date:    08/09/2022  Time:    18:45             Narrative:    EXAMINATION:  Chest two views    CLINICAL HISTORY:  Leg pain, edema    COMPARISON:  06/28/2022    FINDINGS:  The cardiac silhouette is normal in size.  The central vessels are normal.  No confluent airspace disease.  No visible pneumothorax or pleural effusion.                               X-Ray Tibia Fibula Bilateral (Final result)  Result time 08/09/22 18:46:45    Final result by Shiva Salazar MD (08/09/22 18:46:45)                 Impression:      No acute osseous finding      Electronically signed by: Shiva Salazar MD  Date:    08/09/2022  Time:    18:46             Narrative:    EXAMINATION:  Right tibia and fibula two views, left tibia and fibula two views    CLINICAL HISTORY:  Pain    COMPARISON:  06/05/2021    FINDINGS:  There is no acute fracture subluxation.  No periosteal reaction seen.  No osseous destruction.  The visualized joint spaces appear preserved.  The bone mineralization appears normal.  The regional soft tissues appear unremarkable                                 Medications   ketorolac injection 30 mg (30 mg Intramuscular Given 8/9/22 1710)     Medical Decision Making:   Clinical Tests:   Lab Tests: Reviewed and Ordered       <> Summary of Lab: Conclusion    · There is no evidence of a right lower extremity DVT.  · There is no evidence of a left lower extremity DVT.      Radiological Study: Reviewed and Ordered       APC / Resident Notes:   Randal requested that I see this patient at discharge for the leg redness and discomfort.  I agree with assessment/plan as listed above. Attending attestation.     Hx: Patient reports several days worsening redness and swelling of the anterior shin portion of bilateral legs.  Minimal swelling however patient has never had before.  Denies any calf pain or discomfort, no shortness of breath, no fevers, no trauma, no joint involvement.  No other complaints.    Physical exam.  General:  Well appearing, nontoxic, no acute distress  Head: Normocephalic Atraumatic  Eyes: EOMI  ENT: Airway patent, no stridor  Neck: supple  Chest: Lungs CTAB, no respiratory distress  Cardiac: RRR, no murmurs, rubs or gallops  Abdomen: soft, no rebound / guarding / rigidity to deep palpation   Musculoskeletal: LE grossly symmetric.  Bilateral areas of circumferential redness swelling and minimal tenderness approximately 3 cm.  Nonfluctuant, not indurated.  No calf tenderness.  Excellent distal pulses sensation strength.  Skin: Normal skin tone  Neuro: A&Ox3; No obvious focal deficit    Assessment/plan.  Patient's labs are grossly unremarkable except for positive D-dimer.  Bilateral duplex negative for clot.  Not consistent clinically ith DVT and zero symptoms of PE. Likely mild infectious.  Possible bug bite.  Considered karposi sarcoma and erythema nodosum.  Patient has no weight loss, no subjective Bsymptoms things to suggest a fulminant illness such as HIV or of autoimmune etiology.  Will cover for infection at this time and pt will continue to monitor.  Recommended ibuprofen for anti-inflammatory component and provided strict return precautions. Will follow up closely outpatient setting. (zmora)                 Clinical Impression:   Final diagnoses:  [M79.89] Leg swelling (Primary)  [L08.9] Skin infection          ED Disposition Condition    Discharge Stable        ED Prescriptions     Medication Sig Dispense Start Date End Date Auth. Provider    sulfamethoxazole-trimethoprim 800-160mg (BACTRIM DS) 800-160 mg Tab Take 1 tablet by mouth 2 (two) times daily. for 7 days 14 tablet 8/9/2022 8/16/2022 MARLENY Avila    ibuprofen (ADVIL,MOTRIN) 800 MG tablet Take 1 tablet (800 mg total) by mouth every 6 (six) hours as needed for Pain. 20 tablet 8/9/2022  MARLENY Avila        Follow-up Information     Follow up With Specialties Details Why Contact Info    discharge followup    If your symptoms become WORSE or you  DO NOT IMPROVE and you are unable to reach your health care provider, you should RETURN to the emergency department    discharge info    Discussed all pertinent ED information, results, diagnosis and treatment plan; All questions and concerns were addressed at this time. Patient voices understanding of information and instructions. Patient is comfortable with plan and discharge    Griselda Carroll MD Internal Medicine   [address] [facility] [address]  [phone]             MARLENY Avila  08/09/22 [id number]       Pool Gomez MD  08/12/22 [id number]

## 2022-08-11 ENCOUNTER — TELEPHONE (OUTPATIENT)
Dept: INTERNAL MEDICINE | Facility: CLINIC | Age: 42
End: 2022-08-11
Payer: MEDICAID

## 2022-08-11 NOTE — TELEPHONE ENCOUNTER
----- Message from Josi Miller sent at 8/9/2022  3:43 PM CDT -----    ----- Message -----  From: Griselda Carroll MD  Sent: 8/9/2022   8:12 AM CDT  To: [facility] Internal Medicine Clerical Pool    Inform patient mammogram is negative

## 2022-08-11 NOTE — TELEPHONE ENCOUNTER
Pt called, name and  verified. Pt informed of mammogram negative. Pt verbalized 100% understanding. No further questions or concerns noted. Call ended.

## 2022-08-22 ENCOUNTER — OFFICE VISIT (OUTPATIENT)
Dept: NEUROLOGY | Facility: CLINIC | Age: 42
End: 2022-08-22
Payer: MEDICAID

## 2022-08-22 DIAGNOSIS — G43.009 MIGRAINE WITHOUT AURA AND RESPONSIVE TO TREATMENT: ICD-10-CM

## 2022-08-22 PROCEDURE — 1159F PR MEDICATION LIST DOCUMENTED IN MEDICAL RECORD: ICD-10-PCS | Mod: CPTII,95,, | Performed by: NURSE PRACTITIONER

## 2022-08-22 PROCEDURE — 1160F RVW MEDS BY RX/DR IN RCRD: CPT | Mod: CPTII,95,, | Performed by: NURSE PRACTITIONER

## 2022-08-22 PROCEDURE — 4010F PR ACE/ARB THEARPY RXD/TAKEN: ICD-10-PCS | Mod: CPTII,95,, | Performed by: NURSE PRACTITIONER

## 2022-08-22 PROCEDURE — 99214 PR OFFICE/OUTPT VISIT, EST, LEVL IV, 30-39 MIN: ICD-10-PCS | Mod: 95,,, | Performed by: NURSE PRACTITIONER

## 2022-08-22 PROCEDURE — 4010F ACE/ARB THERAPY RXD/TAKEN: CPT | Mod: CPTII,95,, | Performed by: NURSE PRACTITIONER

## 2022-08-22 PROCEDURE — 1160F PR REVIEW ALL MEDS BY PRESCRIBER/CLIN PHARMACIST DOCUMENTED: ICD-10-PCS | Mod: CPTII,95,, | Performed by: NURSE PRACTITIONER

## 2022-08-22 PROCEDURE — 99214 OFFICE O/P EST MOD 30 MIN: CPT | Mod: 95,,, | Performed by: NURSE PRACTITIONER

## 2022-08-22 PROCEDURE — 1159F MED LIST DOCD IN RCRD: CPT | Mod: CPTII,95,, | Performed by: NURSE PRACTITIONER

## 2022-08-22 RX ORDER — SUMATRIPTAN SUCCINATE 100 MG/1
100 TABLET ORAL EVERY 12 HOURS PRN
Qty: 20 TABLET | Refills: 5 | Status: SHIPPED | OUTPATIENT
Start: 2022-08-22 | End: 2023-02-23 | Stop reason: SDUPTHER

## 2022-08-22 NOTE — PROGRESS NOTES
"This is a real-time audio/video visit that was performed with the originating site at patient's home and the distant site, [facility]. Verbal consent to participate in interactive audio & video visit was obtained.    I discussed with the patient regarding the nature of our telehealth visits, that:    - Our sessions are not being recorded and that personal health information is protected  - Provider would evaluate the patient and recommend diagnostics and treatments based on my assessment  - [facility] will provide follow up care in person if/when the patient needs it.   Subjective:       Patient ID: Abdi Combs is a 42 y.o. female.    Chief Complaint: Migraine    HPI   This is a 42 year old Right Handed F with PMHX of HTN, Depression, Anxiety, hysterectomy, LAURA (not currently on CPAP) was referred for Migraine w/ status migrainosus. She was last seen on 2/15/2022. During that visit, Sumatriptan 100mg daily PRN was continued.     Today, Pt states she had a flare up of more frequent migraines due to increased stress, but now back to baseline (1-2 migraines monthly) Last migraine 2 weeks ago. Triptans effective. No longer on CPAP, machine was "recalled" several months ago. Patient believes migraines may be related to CPAP discontinuation, but notes improvement in headache frequency.    Age of Onset - around the time of puberty    Semiology - bifrontal, pounding, 10/10, lasting 1 day, w/ nausea and photophobia.    Frequency - 1-2 headache days/month    Risk Factors -  - Family history of headache disorder? Mom with headache  - History of Head Trauma? denied  - History of CNS infection? denied  - History of underlying mood disorder? anxiety, depression.  - Illicit drug use? denied  - Tobacco use? denied  - History of sleep disorder? moderate LAURA. Started on CPAP in 9/2020. Sleeping better with CPAP.    Workup -  - MRI Brain: denied  - " [facility]: 6/8/2021 - no acute intracranial abnormality identified    Current ppx meds -  Lisinopril 10mg daily     Current abortive meds -  Naproxen prn - BID  Sumatriptan 100mg PO Qday PRN    Prior ppx meds -  Elavil [id number] - ineffective  TPM 100mg daily (1/2018 - 4/2018) - ineffective  Lexapro (stopped 2/26/2020)  Effexor 150mg daily (5/27/2020 - 6/27/2020) - dizziness  Ajovy once per month (11/16/2020 - 8/16/2021) - ineffective    Current abortive meds -  Sumatriptan 100mg Oral TAB PRN - effective  Fioricet prn - only partially effective. Has rebound headaches afterwards.    Prior abortive meds -   Compazine - dystonia    Review of Systems    Constitutional: no fever, fatigue, weakness  Eye: no vision loss, eye redness, drainage, or pain  ENMT: no sore throat, ear pain, sinus pain/congestion, nasal congestion/drainage  Respiratory: no cough, no wheezing, no shortness of breath  Cardiovascular: no chest pain, no palpitations, no edema  Gastrointestinal: no nausea, vomiting, or diarrhea. No abdominal pain  Genitourinary: no dysuria, no urinary frequency or urgency, no hematuria  Hema/Lymph: no abnormal bruising or bleeding  Endocrine: no heat or cold intolerance, no excessive thirst or excessive urination  Musculoskeletal: no muscle or joint pain, no joint swelling  Integumentary: no skin rash or abnormal lesion  Psychiatric: no depressed mood, no anxiety, no visual/auditory hallucinations, no delusions, no suicidal or homicidal ideation  Neurologic: as per HPI    Objective:      Physical Exam    General: well-developed well-nourished in no acute distress  Eye: clear conjunctiva, eyelids normal  HENT: oropharynx without erythema/exudate, oropharynx mucosal surface moist  Neck: full range of motion  Respiratory: no distress on RA  Cardiovascular: unable to assess due to nature of visit   Gastrointestinal: unable to assess due to nature of visit  Genitourinary: unable to assess due to nature of  visit  Musculoskeletal: full range of motion of all extremities  Integumentary: no visible rashes rashes.  Neurologic:  Mental Status- Alert and Oriented to time, self, place, Speech is fluent, with intact repetition, naming, reading, and comprehension., 3/3 in registration, 3/3 in delayed recall, No Dysarthria.  CN II-XII - CN I Deferred, unable to assess fundus, CN V1-3, visual field due to nature of visit, VA grossly normal to motion and finger counting, No ptosis b/l, EOMI w/o nystagmus, T/U midline, Shoulder shrug symmetric b/l,Face Symmetric.  Motor - unable to assess tone and confrontation due to nature of visit, bulk appears grossly normal, no gross abnormal involuntary or voluntary movements, FFM nml b/l, No visible satelliting on orbiting, No pronator drift,  Sensory - unable to assess due to nature of visit  Reflexes - unable to assess due to nature of visit  Cerebellar Exam - FNF wnl b/l no intention tremor.  Gait - Normal, good arm swing b/l.    Assessment:       1. Migraine without aura and responsive to treatment  - sumatriptan (IMITREX) 100 MG tablet; Take 1 tablet (100 mg total) by mouth every 12 (twelve) hours as needed for Migraine.  Dispense: 20 tablet; Refill: 5      Plan:       This is a 42 year old Right Handed F with PMHX of HTN, Depression, Anxiety, hysterectomy, LAURA on CPAP (not currently), with Migraine w/ status migrainosus, stable with relative headache freedom. Had a history of Allyson Hunt Syndrome in 12/2020. Patient no longer on CPAP for last several months due to recall, which seems to correlate with headaches. Pleased with current results    [] c/w Sumatriptan 100mg daily PO prn    rtc 6 months    Headache education provided - including good sleep hygiene, stress management, medication overuse education provided - using more 3 OTC per week may worsen headaches, High intensity interval training has shown to reduce headache frequency. Low carb, high protein has shown to reduce  headache frequency. Patient is instructed to keep headache diary.    I have explained the treatment plan, diagnosis, and prognosis to the patient, caretaker, family. All questions are answered to the best of my knowledge.    Face to Face Time 30 minute, including, counseling, education, review of test results, relevant medical records, and coordination of care.

## 2022-10-06 ENCOUNTER — TELEPHONE (OUTPATIENT)
Dept: INTERNAL MEDICINE | Facility: CLINIC | Age: 42
End: 2022-10-06
Payer: MEDICAID

## 2022-10-06 NOTE — TELEPHONE ENCOUNTER
----- Message from Trisha Callejas sent at 10/6/2022  8:57 AM CDT -----  Regarding: Dr. Carroll-Tele-Med Request  Patient is requesting an audio call for her 3 month RTC on 10/13/22. Would an audio call be okay, no labs are due?Thanks

## 2022-10-07 RX ORDER — ALPRAZOLAM 0.25 MG/1
0.25 TABLET ORAL EVERY 8 HOURS PRN
COMMUNITY
Start: 2022-09-16 | End: 2022-12-06

## 2022-10-13 ENCOUNTER — CLINICAL SUPPORT (OUTPATIENT)
Dept: INTERNAL MEDICINE | Facility: CLINIC | Age: 42
End: 2022-10-13
Payer: MEDICAID

## 2022-10-13 DIAGNOSIS — G43.009 MIGRAINE WITHOUT AURA AND RESPONSIVE TO TREATMENT: ICD-10-CM

## 2022-10-13 DIAGNOSIS — I10 HYPERTENSION, UNSPECIFIED TYPE: ICD-10-CM

## 2022-10-13 DIAGNOSIS — I77.82 ANCA-POSITIVE VASCULITIS: Primary | ICD-10-CM

## 2022-10-13 DIAGNOSIS — G47.33 OBSTRUCTIVE SLEEP APNEA SYNDROME: ICD-10-CM

## 2022-10-13 RX ORDER — PANTOPRAZOLE SODIUM 40 MG/1
40 TABLET, DELAYED RELEASE ORAL DAILY
Qty: 30 TABLET | Refills: 11 | Status: SHIPPED | OUTPATIENT
Start: 2022-10-13 | End: 2023-02-01

## 2022-10-13 RX ORDER — FERROUS SULFATE 325(65) MG
325 TABLET, DELAYED RELEASE (ENTERIC COATED) ORAL
Qty: 45 TABLET | Refills: 0 | Status: SHIPPED | OUTPATIENT
Start: 2022-10-13 | End: 2023-01-11

## 2022-10-13 RX ORDER — HYDROCHLOROTHIAZIDE 25 MG/1
25 TABLET ORAL DAILY
Qty: 90 TABLET | Refills: 0 | Status: SHIPPED | OUTPATIENT
Start: 2022-10-13 | End: 2023-01-20 | Stop reason: SDUPTHER

## 2022-10-13 RX ORDER — ACETAMINOPHEN 160 MG
TABLET,CHEWABLE ORAL
COMMUNITY
End: 2022-12-06

## 2022-10-13 NOTE — PROGRESS NOTES
Established Patient - Audio Only Telehealth Visit     The patient location is: home  The chief complaint leading to consultation is: follow up  Visit type: Virtual visit with audio only (telephone)  Total time spent with patient: 40       The reason for the audio only service rather than synchronous audio and video virtual visit was related to technical difficulties or patient preference/necessity.     Each patient to whom I provide medical services by telemedicine is:  (1) informed of the relationship between the physician and patient and the respective role of any other health care provider with respect to management of the patient; and (2) notified that they may decline to receive medical services by telemedicine and may withdraw from such care at any time. Patient verbally consented to receive this service via voice-only telephone call.       Ms. Abdi Combs is a 42 y.o. female w/ PMH hypertension, GERD, anxiety/depression, allergic rhinitis. s/p FESS 8/11/17 who comes to clinic today for follow up.  Patient has been having nonspecific abdominal pain s/p 1-2 hours after the.  Patient says this pain is chronic and last.  Patient was hospitalized in May and was found to have edematous pancreatitis patient underwent MRCP with no underlying blockage.  Patient's triglyceride and does not have any alcohol use, patient's pancreatitis was then attributed to lisinopril even otherwise noted chronic changes noted.     7/2022 Patient has been miserable she says she is scared of eating, she says that she has only been eating bananas because she says that it is good for your stomach and causes less pain.  Patient is still having pain.  Patient also says that she also been having some migraines which have been intermittent, she says she does not have any migraines at this time and she has an appointment later this month when Neurology.     10/2022:  Patient says that her chest pain is much improved, but she does have some  chest pain that radiates to her back similar to the  pain when she had her pancreatitis.  Patient says that this is seldomly, approximately 1-2 times per month.  These episodes only last approximately 15 minutes.  Patient says that she is able to eat without any issues, except for things that or spicy.  Patient's migraines are much improved s/p treatment.      Abdominal pain:  CT abdomen with contrast completed on 07/20/2022 showed mild stranding involving the superior mesentery and near the pancreatic head/uncinate.  A related to pancreatitis or nonspecific mild mesenteric congestion.  -p-ANCA was positive.    -will send IgG subclasses include Igg subclass 4.  Antimitochondrial rheumatoid factor, MANPREET,   Will send patient for repeat CT abdomen chest pelvis with contrast     Migraines  -Continues following with [facility] neurology    HTN  - patient has been having some lower extremity swelling secondary to Procardia, was switched to hydrochlorothiazide and I have told patient to get a home pressure monitor.  If patient's blood pressures are elevated greater than 130 will add lisinopril    Allergic rhinosinusitis  -Not discussed this visit, continue current plan as needed  --Continue Xyzal    GERD  -Continue protonix    Iron deficiency anemia  AUB s/p hysterectomy  -Continue senna  -Continue iron supplementation every other day  -Continue to follow with [facility] gynecology clinic. Patient has fibroids    Anxiety/depression  -Continues on Xanax, Lunesta, venlafaxine per outside psychiatrist at [facility]    LAURA on CPAP  -Following with sleep specialist at [facility], Dr. Burger        This service was not originating from a related E/M service provided within the previous 7 days nor will  to an E/M service or procedure within the next 24 hours or my soonest available appointment.  Prevailing standard of care was able to be met in this audio-only visit.

## 2022-10-17 ENCOUNTER — LAB VISIT (OUTPATIENT)
Dept: LAB | Facility: HOSPITAL | Age: 42
End: 2022-10-17
Attending: STUDENT IN AN ORGANIZED HEALTH CARE EDUCATION/TRAINING PROGRAM
Payer: MEDICAID

## 2022-10-17 DIAGNOSIS — I77.82 ANCA-POSITIVE VASCULITIS: ICD-10-CM

## 2022-10-17 LAB
IGA SERPL-MCNC: 475 MG/DL (ref 65–421)
IGG SERPL-MCNC: 1937 MG/DL (ref 522–1631)
IGM SERPL-MCNC: 197 MG/DL (ref 33–293)
RHEUMATOID FACT SERPL-ACNC: <13 IU/ML

## 2022-10-17 PROCEDURE — 83516 IMMUNOASSAY NONANTIBODY: CPT

## 2022-10-17 PROCEDURE — 86431 RHEUMATOID FACTOR QUANT: CPT

## 2022-10-17 PROCEDURE — 82784 ASSAY IGA/IGD/IGG/IGM EACH: CPT

## 2022-10-17 PROCEDURE — 86200 CCP ANTIBODY: CPT

## 2022-10-17 PROCEDURE — 86039 ANTINUCLEAR ANTIBODIES (ANA): CPT

## 2022-10-17 PROCEDURE — 36415 COLL VENOUS BLD VENIPUNCTURE: CPT

## 2022-10-18 LAB
ANA SER QL HEP2 SUBST: NORMAL
CYCLIC CITRULLINATED PEPTIDE (CCP) (OLG): NEGATIVE
MAYO GENERIC ORDERABLE RESULT: NORMAL
RHEUMATOID FACTOR IGA (OLG): NEGATIVE
RHEUMATOID FACTOR IGM (OLG): NEGATIVE

## 2022-11-03 LAB — MITOCHONDRIA M2 AB SER IA-ACNC: NORMAL

## 2022-11-08 ENCOUNTER — HOSPITAL ENCOUNTER (OUTPATIENT)
Dept: RADIOLOGY | Facility: HOSPITAL | Age: 42
Discharge: HOME | End: 2022-11-08
Attending: STUDENT IN AN ORGANIZED HEALTH CARE EDUCATION/TRAINING PROGRAM
Payer: MEDICAID

## 2022-11-08 DIAGNOSIS — I77.82 ANCA-POSITIVE VASCULITIS: ICD-10-CM

## 2022-11-08 PROCEDURE — 71260 CT THORAX DX C+: CPT | Mod: TC

## 2022-11-08 PROCEDURE — 25500020 PHARM REV CODE 255: Performed by: STUDENT IN AN ORGANIZED HEALTH CARE EDUCATION/TRAINING PROGRAM

## 2022-11-08 PROCEDURE — 74177 CT ABD & PELVIS W/CONTRAST: CPT | Mod: TC

## 2022-11-08 RX ADMIN — IOPAMIDOL 100 ML: 755 INJECTION, SOLUTION INTRAVENOUS at 08:11

## 2022-12-06 ENCOUNTER — OFFICE VISIT (OUTPATIENT)
Dept: GYNECOLOGY | Facility: CLINIC | Age: 42
End: 2022-12-06
Payer: MEDICAID

## 2022-12-06 ENCOUNTER — LAB VISIT (OUTPATIENT)
Dept: LAB | Facility: HOSPITAL | Age: 42
End: 2022-12-06
Attending: NURSE PRACTITIONER
Payer: MEDICAID

## 2022-12-06 VITALS
RESPIRATION RATE: 19 BRPM | SYSTOLIC BLOOD PRESSURE: 136 MMHG | HEART RATE: 85 BPM | WEIGHT: 293 LBS | DIASTOLIC BLOOD PRESSURE: 84 MMHG | OXYGEN SATURATION: 99 % | HEIGHT: 69 IN | BODY MASS INDEX: 43.4 KG/M2 | TEMPERATURE: 98 F

## 2022-12-06 DIAGNOSIS — Z11.3 ROUTINE SCREENING FOR STI (SEXUALLY TRANSMITTED INFECTION): ICD-10-CM

## 2022-12-06 DIAGNOSIS — R23.2 HOT FLASHES: ICD-10-CM

## 2022-12-06 DIAGNOSIS — Z01.419 ENCOUNTER FOR ANNUAL ROUTINE GYNECOLOGICAL EXAMINATION: Primary | ICD-10-CM

## 2022-12-06 DIAGNOSIS — Z13.31 POSITIVE DEPRESSION SCREENING: ICD-10-CM

## 2022-12-06 LAB
C TRACH DNA SPEC QL NAA+PROBE: NOT DETECTED
CLUE CELLS VAG QL WET PREP: ABNORMAL
HBV SURFACE AG SERPL QL IA: NONREACTIVE
HCV AB SERPL QL IA: NONREACTIVE
HIV 1+2 AB+HIV1 P24 AG SERPL QL IA: NONREACTIVE
N GONORRHOEA DNA SPEC QL NAA+PROBE: NOT DETECTED
T PALLIDUM AB SER QL: NONREACTIVE
T VAGINALIS VAG QL WET PREP: ABNORMAL
WBC #/AREA VAG WET PREP: ABNORMAL
YEAST SPEC QL WET PREP: ABNORMAL

## 2022-12-06 PROCEDURE — 3008F BODY MASS INDEX DOCD: CPT | Mod: CPTII,,, | Performed by: NURSE PRACTITIONER

## 2022-12-06 PROCEDURE — 87491 CHLMYD TRACH DNA AMP PROBE: CPT | Performed by: NURSE PRACTITIONER

## 2022-12-06 PROCEDURE — 3079F DIAST BP 80-89 MM HG: CPT | Mod: CPTII,,, | Performed by: NURSE PRACTITIONER

## 2022-12-06 PROCEDURE — 3075F PR MOST RECENT SYSTOLIC BLOOD PRESS GE 130-139MM HG: ICD-10-PCS | Mod: CPTII,,, | Performed by: NURSE PRACTITIONER

## 2022-12-06 PROCEDURE — 99396 PR PREVENTIVE VISIT,EST,40-64: ICD-10-PCS | Mod: S$PBB,,, | Performed by: NURSE PRACTITIONER

## 2022-12-06 PROCEDURE — 87591 N.GONORRHOEAE DNA AMP PROB: CPT | Performed by: NURSE PRACTITIONER

## 2022-12-06 PROCEDURE — 86780 TREPONEMA PALLIDUM: CPT

## 2022-12-06 PROCEDURE — 3079F PR MOST RECENT DIASTOLIC BLOOD PRESSURE 80-89 MM HG: ICD-10-PCS | Mod: CPTII,,, | Performed by: NURSE PRACTITIONER

## 2022-12-06 PROCEDURE — 1159F PR MEDICATION LIST DOCUMENTED IN MEDICAL RECORD: ICD-10-PCS | Mod: CPTII,,, | Performed by: NURSE PRACTITIONER

## 2022-12-06 PROCEDURE — 87340 HEPATITIS B SURFACE AG IA: CPT

## 2022-12-06 PROCEDURE — 3075F SYST BP GE 130 - 139MM HG: CPT | Mod: CPTII,,, | Performed by: NURSE PRACTITIONER

## 2022-12-06 PROCEDURE — 87210 SMEAR WET MOUNT SALINE/INK: CPT | Performed by: NURSE PRACTITIONER

## 2022-12-06 PROCEDURE — 86803 HEPATITIS C AB TEST: CPT

## 2022-12-06 PROCEDURE — 99396 PREV VISIT EST AGE 40-64: CPT | Mod: S$PBB,,, | Performed by: NURSE PRACTITIONER

## 2022-12-06 PROCEDURE — 4010F ACE/ARB THERAPY RXD/TAKEN: CPT | Mod: CPTII,,, | Performed by: NURSE PRACTITIONER

## 2022-12-06 PROCEDURE — 1159F MED LIST DOCD IN RCRD: CPT | Mod: CPTII,,, | Performed by: NURSE PRACTITIONER

## 2022-12-06 PROCEDURE — 1160F PR REVIEW ALL MEDS BY PRESCRIBER/CLIN PHARMACIST DOCUMENTED: ICD-10-PCS | Mod: CPTII,,, | Performed by: NURSE PRACTITIONER

## 2022-12-06 PROCEDURE — 4010F PR ACE/ARB THEARPY RXD/TAKEN: ICD-10-PCS | Mod: CPTII,,, | Performed by: NURSE PRACTITIONER

## 2022-12-06 PROCEDURE — 87389 HIV-1 AG W/HIV-1&-2 AB AG IA: CPT

## 2022-12-06 PROCEDURE — 36415 COLL VENOUS BLD VENIPUNCTURE: CPT

## 2022-12-06 PROCEDURE — 3008F PR BODY MASS INDEX (BMI) DOCUMENTED: ICD-10-PCS | Mod: CPTII,,, | Performed by: NURSE PRACTITIONER

## 2022-12-06 PROCEDURE — 1160F RVW MEDS BY RX/DR IN RCRD: CPT | Mod: CPTII,,, | Performed by: NURSE PRACTITIONER

## 2022-12-06 PROCEDURE — 99214 OFFICE O/P EST MOD 30 MIN: CPT | Mod: PBBFAC | Performed by: NURSE PRACTITIONER

## 2022-12-06 RX ORDER — NIFEDIPINE 90 MG/1
90 TABLET, EXTENDED RELEASE ORAL
COMMUNITY
Start: 2022-11-20 | End: 2023-02-01 | Stop reason: SDUPTHER

## 2022-12-06 NOTE — PROGRESS NOTES
"  Subjective:       Patient ID: Abdi Combs is a 42 y.o. female.    Chief Complaint:  Annual    History of Present Illness  The patient  here for annual exam. Pt is s/p TVH/BS/Cysto on 10/19/21 for AUB-P/L. Denies history of abnormal paps. MG-22 & BIRADS 1. Denies breast or urinary complaints. Denies pelvic pain, abnormal bleeding or discharge. Pt reports trichomonas in the past and desires testing. Pt does admit to night sweats that occur since hysterectomy. Denies tobacco use. Dep. screening 5, states d/t recent cousin's passing and dealing with depressed son. Followed by Dr. Melgoza with treatment, denies SI or HI. Denies fly hx of breast, ovarian, uterine or colon cancer.       GYN & OB History  No LMP recorded. Patient has had a hysterectomy.     Review of patient's allergies indicates:  No Known Allergies  Past Medical History:   Diagnosis Date    Anxiety     Depression     Headache     Hypertension      OB History    Para Term  AB Living   4 2     2     SAB IAB Ectopic Multiple Live Births   2              # Outcome Date GA Lbr Devan/2nd Weight Sex Delivery Anes PTL Lv   4 Para            3 Para            2 SAB            1 SAB                 Review of Systems  Review of Systems    Negative except for pertinent findings for positives per HPI     Objective:    Physical Exam    /84 (BP Location: Right leg, Patient Position: Sitting, BP Method: Large (Automatic))   Pulse 85   Temp 98.3 °F (36.8 °C) (Oral)   Resp 19   Ht 5' 9" (1.753 m)   Wt 135.7 kg (299 lb 3.2 oz)   SpO2 99%   BMI 44.18 kg/m²   GENERAL: Well-developed female in no acute distress.  SKIN: Normal to inspection,warm, dry and intact.  BREASTS: No masses, lumps, discharge, tenderness.  VULVA: General appearance WNL; external genitalia with no lesions or erythema.  BIMANUAL EXAM: Pink vaginal mucosa, Vaginal cuff intact.Uterus/Cervix surgically absent. Daryn adnexa reveal no tenderness.  PSYCHIATRIC: Patient " is oriented to person, place, and time. Mood and affect are normal.    Assessment:       1. Encounter for annual routine gynecological examination    2. Hot flashes    3. Positive depression screening    4. Routine screening for STI (sexually transmitted infection)       Plan:   Abdi was seen today for annual.    Diagnoses and all orders for this visit:    Encounter for annual routine gynecological examination    Hot flashes    Positive depression screening    Routine screening for STI (sexually transmitted infection)  -     Chlamydia/GC, PCR  -     Hepatitis B Surface Antigen; Future  -     Hepatitis C Antibody; Future  -     HIV 1/2 Ag/Ab (4th Gen); Future  -     SYPHILIS ANTIBODY (WITH REFLEX RPR); Future  -     Wet Prep, Genital  Pelvic today, pap deferred d/t hysterectomy  STI screening  Pt will start with OTC preparations such as Estroven or Amberen. Request HRT evaluation, will schedule with GYN.  Denies SI or HI, pt has f/u with psych provider.  Follow up in about 1 year (around 12/6/2023) for annual exam.

## 2022-12-06 NOTE — LETTER
December 6, 2022      [facility]  [address] [address]  Phone: [phone]       Patient: Abdi Combs   YOB: 1980  Date of Visit: 12/06/2022    To Whom It May Concern:    Delfina Combs  was at [facility] on 12/06/2022. The patient may return to work/school on 12/07/2022 with no restrictions. If you have any questions or concerns, or if I can be of further assistance, please do not hesitate to contact me.    Sincerely,    Ole Ng NP

## 2022-12-07 ENCOUNTER — TELEPHONE (OUTPATIENT)
Dept: GYNECOLOGY | Facility: CLINIC | Age: 42
End: 2022-12-07
Payer: MEDICAID

## 2022-12-07 DIAGNOSIS — B37.31 YEAST VAGINITIS: Primary | ICD-10-CM

## 2022-12-07 RX ORDER — TERCONAZOLE 8 MG/G
1 CREAM VAGINAL NIGHTLY
Qty: 3 G | Refills: 0 | Status: SHIPPED | OUTPATIENT
Start: 2022-12-07 | End: 2022-12-10

## 2022-12-07 NOTE — TELEPHONE ENCOUNTER
Inform pt that her swab showed that she has a yeast infection. RX sent to pharmacy for vaginal cream x3 nights. Avoid prolonged wet clothing; avoid tight fitting clothing. Wear cotton underwear. Wipe front to back. Recommend probiotics.

## 2022-12-07 NOTE — TELEPHONE ENCOUNTER
Informed pt that EMR alerted me of a toxicity with use of Fluconazole and xanax. Unable to prescribed together.

## 2022-12-07 NOTE — TELEPHONE ENCOUNTER
Spoke to PT gave positive results for yeast infection. Informed PT of medication sent to the pharmacy. PT stated that she would prefer pill form, cream irritates her. Informed PT that I would make NP aware and return the call. Please advise..

## 2023-01-18 DIAGNOSIS — I10 HYPERTENSION, UNSPECIFIED TYPE: Primary | ICD-10-CM

## 2023-01-18 RX ORDER — HYDROCHLOROTHIAZIDE 25 MG/1
25 TABLET ORAL DAILY
Qty: 90 TABLET | Refills: 0 | Status: CANCELLED | OUTPATIENT
Start: 2023-01-18 | End: 2023-04-18

## 2023-01-18 NOTE — TELEPHONE ENCOUNTER
----- Message from Trisha Callejas sent at 1/18/2023 12:35 PM CST -----  Regarding: Dr. Carroll-Refill Request  Patient has called in to request that a refill of Hydrochlorothiazide 25 mg be sent to [facility] in [place]. Patient has a confirmed 4 month RTC on 02/01/23. Thanks

## 2023-01-19 RX ORDER — HYDROCHLOROTHIAZIDE 25 MG/1
25 TABLET ORAL DAILY
Qty: 90 TABLET | Refills: 0 | Status: CANCELLED | OUTPATIENT
Start: 2023-01-19 | End: 2023-04-19

## 2023-01-20 DIAGNOSIS — I10 HYPERTENSION, UNSPECIFIED TYPE: Primary | ICD-10-CM

## 2023-01-20 RX ORDER — HYDROCHLOROTHIAZIDE 25 MG/1
25 TABLET ORAL DAILY
Qty: 90 TABLET | Refills: 1 | Status: SHIPPED | OUTPATIENT
Start: 2023-01-20 | End: 2023-01-20 | Stop reason: SDUPTHER

## 2023-01-21 RX ORDER — HYDROCHLOROTHIAZIDE 25 MG/1
25 TABLET ORAL DAILY
Qty: 90 TABLET | Refills: 1 | Status: SHIPPED | OUTPATIENT
Start: 2023-01-21 | End: 2023-02-01 | Stop reason: SDUPTHER

## 2023-01-24 ENCOUNTER — HOSPITAL ENCOUNTER (EMERGENCY)
Facility: HOSPITAL | Age: 43
Discharge: HOME | End: 2023-01-24
Attending: STUDENT IN AN ORGANIZED HEALTH CARE EDUCATION/TRAINING PROGRAM
Payer: MEDICAID

## 2023-01-24 VITALS
HEIGHT: 67 IN | BODY MASS INDEX: 28.25 KG/M2 | RESPIRATION RATE: 20 BRPM | SYSTOLIC BLOOD PRESSURE: 139 MMHG | WEIGHT: 180 LBS | DIASTOLIC BLOOD PRESSURE: 96 MMHG | HEART RATE: 64 BPM | OXYGEN SATURATION: 97 % | TEMPERATURE: 98 F

## 2023-01-24 DIAGNOSIS — F41.9 ANXIETY: Primary | ICD-10-CM

## 2023-01-24 PROCEDURE — 25000003 PHARM REV CODE 250: Performed by: NURSE PRACTITIONER

## 2023-01-24 PROCEDURE — 99283 EMERGENCY DEPT VISIT LOW MDM: CPT

## 2023-01-24 RX ORDER — LORAZEPAM 0.5 MG/1
2 TABLET ORAL
Status: COMPLETED | OUTPATIENT
Start: 2023-01-24 | End: 2023-01-24

## 2023-01-24 RX ORDER — VENLAFAXINE HYDROCHLORIDE 150 MG/1
150 CAPSULE, EXTENDED RELEASE ORAL DAILY
Qty: 14 CAPSULE | Refills: 0 | Status: SHIPPED | OUTPATIENT
Start: 2023-01-24 | End: 2023-01-24 | Stop reason: SDUPTHER

## 2023-01-24 RX ORDER — VENLAFAXINE HYDROCHLORIDE 150 MG/1
150 CAPSULE, EXTENDED RELEASE ORAL DAILY
Qty: 14 CAPSULE | Refills: 0 | Status: SHIPPED | OUTPATIENT
Start: 2023-01-24 | End: 2023-02-01

## 2023-01-24 RX ADMIN — LORAZEPAM 2 MG: 0.5 TABLET ORAL at 06:01

## 2023-01-24 NOTE — Clinical Note
"Comarita "Comarita" Garret was seen and treated in our emergency department on 1/24/2023.  She may return to work on 01/28/2023.       If you have any questions or concerns, please don't hesitate to call.      LIZBETH Browne"

## 2023-01-25 NOTE — ED PROVIDER NOTES
Encounter Date: 2023       History     Chief Complaint   Patient presents with    Anxiety     Pt states has anxiety dt stress with son having mental issues.      42-year-old female presents to ER stating she is having an anxiety attack.  She denies any HI or SI.  She states her son is away due to having some mental issues and she is under a lot of stress.  She denies any history of self-harm.  She states she does take medication for anxiety but is out and her appointment is not until next week with Dr. Jaquez.  Patient states she is now having cramping of her fingers and hands.  She is breathing fast and shallow.  She denies any chest pain.    The history is provided by the patient. No  was used.   Review of patient's allergies indicates:  No Known Allergies  Past Medical History:   Diagnosis Date    Anxiety     Depression     Headache     Hypertension      Past Surgical History:   Procedure Laterality Date     SECTION      CHOLECYSTECTOMY      HYSTERECTOMY       Family History   Problem Relation Age of Onset    Hypertension Mother     Hypertension Father     Heart disease Father      Social History     Tobacco Use    Smoking status: Never    Smokeless tobacco: Never   Substance Use Topics    Alcohol use: Not Currently    Drug use: Never     Review of Systems   Constitutional:  Negative for diaphoresis and fever.   Eyes:  Negative for visual disturbance.   Respiratory:  Positive for shortness of breath. Negative for chest tightness.    Cardiovascular:  Negative for chest pain.   Gastrointestinal:  Negative for vomiting.   Musculoskeletal:  Positive for myalgias.   Psychiatric/Behavioral:  The patient is nervous/anxious.    All other systems reviewed and are negative.    Physical Exam     Initial Vitals [23 1814]   BP Pulse Resp Temp SpO2   (!) 149/92 79 18 98.2 °F (36.8 °C) 100 %      MAP       --         Physical Exam    Constitutional: She appears well-developed and  well-nourished.   HENT:   Head: Normocephalic.   Eyes: EOM are normal.   Neck: Neck supple.   Normal range of motion.  Cardiovascular:  Normal rate, regular rhythm and normal heart sounds.           Pulmonary/Chest: Breath sounds normal. Tachypnea noted. She has no wheezes.   Abdominal: Abdomen is soft.   Musculoskeletal:        Arms:       Cervical back: Normal range of motion and neck supple.     Neurological: She is alert and oriented to person, place, and time.   Skin: Skin is warm and dry. Capillary refill takes less than 2 seconds.       ED Course   Procedures  Labs Reviewed - No data to display       Imaging Results    None          Medications   LORazepam tablet 2 mg (2 mg Oral Given 1/24/23 1849)     Medical Decision Making:   Initial Assessment:   Patient with shallow and fast respirations. She is having carpal spasms bilaterally. She denies SI/HI. She reports history of anxiety and states out of medications. Next appointment is next week. She reports having increased stress due to family issues with her son. She denies chest pain.   Differential Diagnosis:   Anxiety, hyperventilation, carpal spasm  ED Management:  Patient's breathing has improved. She reports improvement of cramping in his hands. She states she has been out of her Effexor. Will refill this for her for two weeks until she sees her psychiatrist. Patient given Ativan 2 mg PO and states anxiety is improving. She continues to deny SI/HI.                         Clinical Impression:   Final diagnoses:  [F41.9] Anxiety (Primary)        ED Disposition Condition    Discharge Stable          ED Prescriptions       Medication Sig Dispense Start Date End Date Auth. Provider    venlafaxine (EFFEXOR-XR) 150 MG Cp24 Take 1 capsule (150 mg total) by mouth once daily. for 14 days 14 capsule 1/24/2023 2/7/2023 LIZBETH Browne          Follow-up Information    None          LIZBETH Browne  01/24/23 1927

## 2023-02-01 ENCOUNTER — CLINICAL SUPPORT (OUTPATIENT)
Dept: INTERNAL MEDICINE | Facility: CLINIC | Age: 43
End: 2023-02-01
Payer: MEDICAID

## 2023-02-01 DIAGNOSIS — I10 HYPERTENSION, UNSPECIFIED TYPE: Primary | ICD-10-CM

## 2023-02-01 RX ORDER — NIFEDIPINE 90 MG/1
90 TABLET, EXTENDED RELEASE ORAL DAILY
Qty: 90 TABLET | Refills: 1 | Status: SHIPPED | OUTPATIENT
Start: 2023-02-01 | End: 2023-04-25 | Stop reason: SDUPTHER

## 2023-02-01 RX ORDER — FLUTICASONE PROPIONATE 50 MCG
1 SPRAY, SUSPENSION (ML) NASAL 2 TIMES DAILY
Qty: 16 G | Refills: 2 | Status: SHIPPED | OUTPATIENT
Start: 2023-02-01 | End: 2023-07-31

## 2023-02-01 RX ORDER — ZOLPIDEM TARTRATE 10 MG/1
10 TABLET ORAL NIGHTLY PRN
COMMUNITY
Start: 2023-01-31 | End: 2023-11-07 | Stop reason: ALTCHOICE

## 2023-02-01 RX ORDER — FERROUS SULFATE 324(65)MG
324 TABLET, DELAYED RELEASE (ENTERIC COATED) ORAL EVERY OTHER DAY
Qty: 45 TABLET | Refills: 1 | Status: SHIPPED | OUTPATIENT
Start: 2023-02-01 | End: 2023-04-25 | Stop reason: SDUPTHER

## 2023-02-01 RX ORDER — HYDROCHLOROTHIAZIDE 25 MG/1
25 TABLET ORAL DAILY
Qty: 90 TABLET | Refills: 1 | Status: SHIPPED | OUTPATIENT
Start: 2023-02-01 | End: 2023-04-25 | Stop reason: SDUPTHER

## 2023-02-01 RX ORDER — CLONAZEPAM 2 MG/1
2 TABLET ORAL DAILY
COMMUNITY
Start: 2023-01-31

## 2023-02-01 RX ORDER — QUETIAPINE FUMARATE 200 MG/1
200 TABLET, FILM COATED ORAL NIGHTLY
COMMUNITY
Start: 2023-01-30 | End: 2023-11-07 | Stop reason: ALTCHOICE

## 2023-02-01 RX ORDER — VIT C/E/ZN/COPPR/LUTEIN/ZEAXAN 250MG-90MG
1000 CAPSULE ORAL DAILY
Qty: 90 CAPSULE | Refills: 1 | Status: SHIPPED | OUTPATIENT
Start: 2023-02-01 | End: 2023-04-25 | Stop reason: SDUPTHER

## 2023-02-01 NOTE — PROGRESS NOTES
Established Patient - Audio Only Telehealth Visit     The patient location is: Home  The chief complaint leading to consultation is: Anxiety   Visit type: Virtual visit with audio only (telephone)  Total time spent with patient: 35       The reason for the audio only service rather than synchronous audio and video virtual visit was related to technical difficulties or patient preference/necessity.     Each patient to whom I provide medical services by telemedicine is:  (1) informed of the relationship between the physician and patient and the respective role of any other health care provider with respect to management of the patient; and (2) notified that they may decline to receive medical services by telemedicine and may withdraw from such care at any time. Patient verbally consented to receive this service via voice-only telephone call.       HPI: Ms. Abdi Combs is a 42 y.o. female w/ PMH hypertension, GERD, anxiety/depression, allergic rhinitis. s/p FESS 8/11/17 who comes to clinic today for follow up.  Patient has been having nonspecific abdominal pain s/p 1-2 hours after the.  Patient says this pain is chronic and last.  Patient was hospitalized in May and was found to have edematous pancreatitis patient underwent MRCP with no underlying blockage.  Patient's triglyceride and does not have any alcohol use, patient's pancreatitis was then attributed to lisinopril even otherwise noted chronic changes noted.      7/2022 Patient has been miserable she says she is scared of eating, she says that she has only been eating bananas because she says that it is good for your stomach and causes less pain.  Patient is still having pain.  Patient also says that she also been having some migraines which have been intermittent, she says she does not have any migraines at this time and she has an appointment later this month when Neurology.     10/2022:  Patient says that her chest pain is much improved, but she does have  some chest pain that radiates to her back similar to the  pain when she had her pancreatitis.  Patient says that this is seldomly, approximately 1-2 times per month.  These episodes only last approximately 15 minutes.  Patient says that she is able to eat without any issues, except for things that or spicy.  Patient's migraines are much improved s/p treatment.     2/1/2023  Patient says her panic disorder is much worse, she says that she feels that she can not breathe and hyperventilates and had a be hospitalized and passed out.  Patient was recently started on medications.  Patient says that she is taking Klonopin, Ambien, Seroquel.  Patient says that these medications have not been helping her as she was recently started on empiric patient says that she is not been able to work since her last episode of anxiety.    Assessment and plan:   Abdominal pain:  CT abdomen with contrast completed on 07/20/2022 showed mild stranding involving the superior mesentery and near the pancreatic head/uncinate.  A related to pancreatitis or nonspecific mild mesenteric congestion.  -p-ANCA was positive.    -repeat CT abdomen and pelvis has shown resolution of above    Migraines  -Continues following with [facility] neurology    HTN  - continue procardia and hctz    Allergic rhinosinusitis  -Not discussed this visit, continue current plan as needed  --Continue Xyzal    Iron deficiency anemia  AUB s/p hysterectomy  -Continue senna  -Continue iron supplementation every other day  -Repeat iron and cbc     Anxiety/depression  - per outside psychiatrist at [facility]  -will get tsh and vit d      LAURA on CPAP  -Following with sleep specialist at [facility], [place]. Burger      This service was not originating from a related E/M service provided within the previous 7 days nor will  to an E/M service or procedure within the next 24 hours or my soonest available appointment.  Prevailing standard of care was able to be met in this audio-only  visit.

## 2023-02-02 ENCOUNTER — TELEPHONE (OUTPATIENT)
Dept: INTERNAL MEDICINE | Facility: CLINIC | Age: 43
End: 2023-02-02
Payer: MEDICAID

## 2023-02-02 ENCOUNTER — LAB VISIT (OUTPATIENT)
Dept: LAB | Facility: HOSPITAL | Age: 43
End: 2023-02-02
Attending: STUDENT IN AN ORGANIZED HEALTH CARE EDUCATION/TRAINING PROGRAM
Payer: MEDICAID

## 2023-02-02 DIAGNOSIS — I10 HYPERTENSION, UNSPECIFIED TYPE: ICD-10-CM

## 2023-02-02 LAB
ALBUMIN SERPL-MCNC: 3.4 G/DL (ref 3.5–5)
ALBUMIN/GLOB SERPL: 0.8 RATIO (ref 1.1–2)
ALP SERPL-CCNC: 76 UNIT/L (ref 40–150)
ALT SERPL-CCNC: 14 UNIT/L (ref 0–55)
AST SERPL-CCNC: 14 UNIT/L (ref 5–34)
BASOPHILS # BLD AUTO: 0.03 X10(3)/MCL (ref 0–0.2)
BASOPHILS NFR BLD AUTO: 0.4 %
BILIRUBIN DIRECT+TOT PNL SERPL-MCNC: 0.3 MG/DL
BUN SERPL-MCNC: 8.5 MG/DL (ref 7–18.7)
CALCIUM SERPL-MCNC: 9.5 MG/DL (ref 8.4–10.2)
CHLORIDE SERPL-SCNC: 102 MMOL/L (ref 98–107)
CO2 SERPL-SCNC: 28 MMOL/L (ref 22–29)
CREAT SERPL-MCNC: 0.71 MG/DL (ref 0.55–1.02)
DEPRECATED CALCIDIOL+CALCIFEROL SERPL-MC: 26.1 NG/ML (ref 30–80)
EOSINOPHIL # BLD AUTO: 0.1 X10(3)/MCL (ref 0–0.9)
EOSINOPHIL NFR BLD AUTO: 1.2 %
ERYTHROCYTE [DISTWIDTH] IN BLOOD BY AUTOMATED COUNT: 15.5 % (ref 11.5–17)
FERRITIN SERPL-MCNC: 48.5 NG/ML (ref 4.63–204)
GFR SERPLBLD CREATININE-BSD FMLA CKD-EPI: >60 MLS/MIN/1.73/M2
GLOBULIN SER-MCNC: 4.1 GM/DL (ref 2.4–3.5)
GLUCOSE SERPL-MCNC: 100 MG/DL (ref 74–100)
HCT VFR BLD AUTO: 41 % (ref 37–47)
HGB BLD-MCNC: 13.3 GM/DL (ref 12–16)
IMM GRANULOCYTES # BLD AUTO: 0.01 X10(3)/MCL (ref 0–0.04)
IMM GRANULOCYTES NFR BLD AUTO: 0.1 %
IRON SATN MFR SERPL: 17 % (ref 20–50)
IRON SERPL-MCNC: 41 UG/DL (ref 50–170)
LYMPHOCYTES # BLD AUTO: 2.39 X10(3)/MCL (ref 0.6–4.6)
LYMPHOCYTES NFR BLD AUTO: 29.2 %
MAGNESIUM SERPL-MCNC: 1.9 MG/DL (ref 1.6–2.6)
MCH RBC QN AUTO: 24.5 PG
MCHC RBC AUTO-ENTMCNC: 32.4 MG/DL (ref 33–36)
MCV RBC AUTO: 75.6 FL (ref 80–94)
MONOCYTES # BLD AUTO: 0.85 X10(3)/MCL (ref 0.1–1.3)
MONOCYTES NFR BLD AUTO: 10.4 %
NEUTROPHILS # BLD AUTO: 4.8 X10(3)/MCL (ref 2.1–9.2)
NEUTROPHILS NFR BLD AUTO: 58.7 %
PHOSPHATE SERPL-MCNC: 4.4 MG/DL (ref 2.3–4.7)
PLATELET # BLD AUTO: 244 X10(3)/MCL (ref 130–400)
PMV BLD AUTO: 10.4 FL (ref 7.4–10.4)
POTASSIUM SERPL-SCNC: 3.5 MMOL/L (ref 3.5–5.1)
PROT SERPL-MCNC: 7.5 GM/DL (ref 6.4–8.3)
RBC # BLD AUTO: 5.42 X10(6)/MCL (ref 4.2–5.4)
SODIUM SERPL-SCNC: 139 MMOL/L (ref 136–145)
T4 FREE SERPL-MCNC: 1.06 NG/DL (ref 0.7–1.48)
TIBC SERPL-MCNC: 200 UG/DL (ref 70–310)
TIBC SERPL-MCNC: 241 UG/DL (ref 250–450)
TRANSFERRIN SERPL-MCNC: 218 MG/DL (ref 180–382)
TSH SERPL-ACNC: 1.45 UIU/ML (ref 0.35–4.94)
WBC # SPEC AUTO: 8.2 X10(3)/MCL (ref 4.5–11.5)

## 2023-02-02 PROCEDURE — 84443 ASSAY THYROID STIM HORMONE: CPT

## 2023-02-02 PROCEDURE — 84100 ASSAY OF PHOSPHORUS: CPT

## 2023-02-02 PROCEDURE — 85025 COMPLETE CBC W/AUTO DIFF WBC: CPT

## 2023-02-02 PROCEDURE — 82728 ASSAY OF FERRITIN: CPT

## 2023-02-02 PROCEDURE — 84439 ASSAY OF FREE THYROXINE: CPT

## 2023-02-02 PROCEDURE — 36415 COLL VENOUS BLD VENIPUNCTURE: CPT

## 2023-02-02 PROCEDURE — 80053 COMPREHEN METABOLIC PANEL: CPT

## 2023-02-02 PROCEDURE — 82306 VITAMIN D 25 HYDROXY: CPT

## 2023-02-02 PROCEDURE — 83735 ASSAY OF MAGNESIUM: CPT

## 2023-02-02 PROCEDURE — 83550 IRON BINDING TEST: CPT

## 2023-02-10 ENCOUNTER — CLINICAL SUPPORT (OUTPATIENT)
Dept: GYNECOLOGY | Facility: CLINIC | Age: 43
End: 2023-02-10
Payer: MEDICAID

## 2023-02-10 DIAGNOSIS — R23.2 HOT FLASHES: Primary | ICD-10-CM

## 2023-02-10 RX ORDER — ESTRADIOL 0.05 MG/D
1 PATCH TRANSDERMAL
Qty: 4 PATCH | Refills: 11 | Status: SHIPPED | OUTPATIENT
Start: 2023-02-10 | End: 2023-11-07 | Stop reason: ALTCHOICE

## 2023-02-10 NOTE — PROGRESS NOTES
\[facility] [place] [facility]\"" OB/GYN CLINIC NOTE  [facility]  [address] [place] [address]  Phone: [phone]  Fax: [phone]    \[facility] [place] [facility]\"" Women's Health Clinic Progress Note    Primary Site: [facility]  Patient location: Home  Physician location: In office      Chief Complaint: Hot Flashes    EVERTON Combs is a 41yo  who joined me via our telemedicine platform for discussion of complaint of hot flashes. Patient states that they have been present since her TVH/BS/Cystoscopy that was performed on 10/19/2021 with benign pathology. Hot flashes have been occurring 3-4x per week primarily at night time. Denies any sleep disturbances or mood lability. Denies any vaginal bleeding or abnormal discharge.    I have reviewed family history, social history, medications and allergies as documented in the patient's electronic medical record.  Reports, labs, outside records, and images have been reviewed with pertinent interpretations below. See telemedicine notes records for intervening communications.    Assessment/Plan  Problem List Items Addressed This Visit    None  Visit Diagnoses       Hot flashes    -  Primary          -Discussed with patient dietary and hygiene changes that can be made to potentially add in symptomatic complaint of hot flashes.  -Patient also interested in estrogen replacement therapy. Will prescribe Estradiol 0.05mg weekly patches.   -RTC in 3 months via telemedicine to assess response to medical therapy.    See correspondence above for plan.   Patient's needs assessed and health education provided. Patient understands risks, benefits, and alternatives of treatment prescribed above. Patient verbalizes understanding and agrees to follow plan.    Patient's identity was confirmed and confidentiality/privacy confirmed prior to visit. I certify that this visit was done via secure two-way transmission with informed consent of the patient and/or guardian.  Each patient to whom I provide medical services by telemedicine is:   (1) informed of the relationship between the physician and patient and the respective role of any other health care provider with respect to management of the patient; and (2) notified that they may decline to receive medical services by telemedicine and may withdraw from such care at any time. Patient verbally consented to receive this service via voice-only telephone call.    Audio only was selected because there was no capability for video conferencing with patient.  Patient and plan discussed with Dr. Alex.    Geoffrey Genao MD  [facility], PGY-4

## 2023-02-22 NOTE — PROGRESS NOTES
[facility]rology Telemedicine Visit Note    This is a real-time audio/video visit that was performed with the originating site at patient's home and the distant site, [facility]. Verbal consent to participate in interactive audio & video visit was obtained.    I discussed with the patient regarding the nature of our telehealth visits, that:    - Our sessions are not being recorded and that personal health information is protected  - Provider would evaluate the patient and recommend diagnostics and treatments based on my assessment  - [facility] will provide follow up care in person if/when the patient needs it.     Subjective:       Patient ID: Abdi Combs is a 42 y.o. female.    Chief Complaint: Migraine (Pt. States migraines are much much better.)    HPI   This is a 42 year old Right Handed F with PMHX of HTN, Depression, Anxiety, hysterectomy, LAURA on CPAP was referred for Migraine w/ status migrainosus. She was last seen on 8/22/2022. During that visit, Sumatriptan 100mg daily PRN was continued.     Today, Pt states her migraines are well controlled with current medication regimen. Last migraine 2 weeks ago, sumatriptan effective. Averages <1/month. Is currently utilizing CPAP, and awakens refreshed, no longer experiencing daytime somnolence. Currently followed by cardiologist for palpitations, has heart monitor. No dx thus far. (Hx of anxiety)    Age of Onset - around the time of puberty    Semiology - bifrontal, pounding, 10/10, lasting 1 day, w/ nausea and photophobia.    Frequency - <1 headache days/month    Risk Factors -  - Family history of headache disorder? Mom with headache  - History of Head Trauma? denied  - History of CNS infection? denied  - History of underlying mood disorder? anxiety, depression.  - Illicit drug use? denied  - Tobacco use? denied  - History of sleep disorder? moderate LAURA. Started on CPAP  in 9/2020. Sleeping better with CPAP.    Workup -  - MRI Brain: denied  - [facility]: 6/8/2021 - no acute intracranial abnormality identified    Current ppx meds -  Lisinopril 10mg daily     Current abortive meds -  Naproxen prn - BID  Sumatriptan 100mg PO Qday PRN    Prior ppx meds -  Elavil [id number] - ineffective  TPM 100mg daily (1/2018 - 4/2018) - ineffective  Lexapro (stopped 2/26/2020)  Effexor 150mg daily (5/27/2020 - 6/27/2020) - dizziness  Ajovy once per month (11/16/2020 - 8/16/2021) - ineffective    Current abortive meds -  Sumatriptan 100mg Oral TAB PRN - effective  Fioricet prn - only partially effective. Has rebound headaches afterwards.    Prior abortive meds -   Compazine - dystonia    Results for orders placed or performed in visit on 02/02/23   TSH   Result Value Ref Range    Thyroid Stimulating Hormone 1.453 0.350 - 4.940 uIU/mL   T4, Free   Result Value Ref Range    Thyroxine Free 1.06 0.70 - 1.48 ng/dL   Comprehensive Metabolic Panel   Result Value Ref Range    Sodium Level 139 136 - 145 mmol/L    Potassium Level 3.5 3.5 - 5.1 mmol/L    Chloride 102 98 - 107 mmol/L    Carbon Dioxide 28 22 - 29 mmol/L    Glucose Level 100 74 - 100 mg/dL    Blood Urea Nitrogen 8.5 7.0 - 18.7 mg/dL    Creatinine 0.71 0.55 - 1.02 mg/dL    Calcium Level Total 9.5 8.4 - 10.2 mg/dL    Protein Total 7.5 6.4 - 8.3 gm/dL    Albumin Level 3.4 (L) 3.5 - 5.0 g/dL    Globulin 4.1 (H) 2.4 - 3.5 gm/dL    Albumin/Globulin Ratio 0.8 (L) 1.1 - 2.0 ratio    Bilirubin Total 0.3 <=1.5 mg/dL    Alkaline Phosphatase 76 40 - 150 unit/L    Alanine Aminotransferase 14 0 - 55 unit/L    Aspartate Aminotransferase 14 5 - 34 unit/L    eGFR >60 mls/min/1.73/m2   Magnesium   Result Value Ref Range    Magnesium Level 1.90 1.60 - 2.60 mg/dL   Phosphorus   Result Value Ref Range    Phosphorus Level 4.4 2.3 - 4.7 mg/dL   Vitamin D   Result Value Ref Range    Vit D 25 OH 26.1 (L) 30.0 - 80.0 ng/mL   Iron and TIBC   Result Value Ref Range    Iron Binding  Capacity Unsaturated 200 70 - 310 ug/dL    Iron Level 41 (L) 50 - 170 ug/dL    Transferrin 218 180 - 382 mg/dL    Iron Binding Capacity Total 241 (L) 250 - 450 ug/dL    Iron Saturation 17 (L) 20 - 50 %   Ferritin   Result Value Ref Range    Ferritin Level 48.50 4.63 - 204.00 ng/mL   CBC with Differential   Result Value Ref Range    WBC 8.2 4.5 - 11.5 x10(3)/mcL    RBC 5.42 (H) 4.20 - 5.40 x10(6)/mcL    Hgb 13.3 12.0 - 16.0 gm/dL    Hct 41.0 37.0 - 47.0 %    MCV 75.6 (L) 80.0 - 94.0 fL    MCH 24.5 pg    MCHC 32.4 (L) 33.0 - 36.0 mg/dL    RDW 15.5 11.5 - 17.0 %    Platelet 244 130 - 400 x10(3)/mcL    MPV 10.4 7.4 - 10.4 fL    Neut % 58.7 %    Lymph % 29.2 %    Mono % 10.4 %    Eos % 1.2 %    Basophil % 0.4 %    Lymph # 2.39 0.6 - 4.6 x10(3)/mcL    Neut # 4.80 2.1 - 9.2 x10(3)/mcL    Mono # 0.85 0.1 - 1.3 x10(3)/mcL    Eos # 0.10 0 - 0.9 x10(3)/mcL    Baso # 0.03 0 - 0.2 x10(3)/mcL    IG# 0.01 0 - 0.04 x10(3)/mcL    IG% 0.1 %       Review of Systems    Constitutional: no fever, fatigue, weakness  Eye: no vision loss, eye redness, drainage, or pain  ENMT: no sore throat, ear pain, sinus pain/congestion, nasal congestion/drainage  Respiratory: no cough, no wheezing, no shortness of breath  Cardiovascular: no chest pain, no palpitations, no edema  Gastrointestinal: no nausea, vomiting, or diarrhea. No abdominal pain  Genitourinary: no dysuria, no urinary frequency or urgency, no hematuria  Hema/Lymph: no abnormal bruising or bleeding  Endocrine: no heat or cold intolerance, no excessive thirst or excessive urination  Musculoskeletal: no muscle or joint pain, no joint swelling  Integumentary: no skin rash or abnormal lesion  Psychiatric: no depressed mood, no anxiety, no visual/auditory hallucinations, no delusions, no suicidal or homicidal ideation  Neurologic: as per HPI    Objective:      Physical Exam    General: well-developed well-nourished in no acute distress  Eye: clear conjunctiva, eyelids normal  HENT:  oropharynx without erythema/exudate, oropharynx mucosal surface moist  Neck: full range of motion  Respiratory: no distress on RA  Cardiovascular: unable to assess due to nature of visit   Gastrointestinal: unable to assess due to nature of visit  Genitourinary: unable to assess due to nature of visit  Musculoskeletal: full range of motion of all extremities  Integumentary: no visible rashes rashes.  Neurologic:  Mental Status- Alert and Oriented to time, self, place, Speech is fluent, with intact repetition, naming, reading, and comprehension., 3/3 in registration, 3/3 in delayed recall, No Dysarthria.  CN II-XII - CN I Deferred, unable to assess fundus, CN V1-3, visual field due to nature of visit, VA grossly normal to motion and finger counting, No ptosis b/l, EOMI w/o nystagmus, T/U midline, Shoulder shrug symmetric b/l,Face Symmetric.  Motor - unable to assess tone and confrontation due to nature of visit, bulk appears grossly normal, no gross abnormal involuntary or voluntary movements, FFM nml b/l, No visible satelliting on orbiting, No pronator drift,  Sensory - unable to assess due to nature of visit  Reflexes - unable to assess due to nature of visit  Cerebellar Exam - FNF wnl b/l no intention tremor.  Gait - Normal, good arm swing b/l.    Assessment:       1. Migraine without aura and responsive to treatment      Plan:       This is a 42 year old Right Handed F with PMHX of HTN, Depression, Anxiety, hysterectomy, LAURA on CPAP (not currently), with Migraine w/ status migrainosus, stable with relative headache freedom. Had a history of Allyson Hunt Syndrome in 12/2020. Utilizing CPAP and feels refreshed upon awakening. Pleased with current results. Followed by cardiology for heart palpitations. Migraines >1/month    [] c/w Sumatriptan 100mg daily PO prn  [] Pt to call or message with cardiology results; may change abortive therapy to Ubrelvy    RTC 6 months    Headache education provided - including good  sleep hygiene, stress management, medication overuse education provided - using more 3 OTC per week may worsen headaches, High intensity interval training has shown to reduce headache frequency. Low carb, high protein has shown to reduce headache frequency. Patient is instructed to keep headache diary.    I have explained the treatment plan, diagnosis, and prognosis to the patient, caretaker, family. All questions are answered to the best of my knowledge.    Face to Face Time 30 minute, including, counseling, education, review of test results, relevant medical records, and coordination of care.

## 2023-02-23 ENCOUNTER — OFFICE VISIT (OUTPATIENT)
Dept: NEUROLOGY | Facility: CLINIC | Age: 43
End: 2023-02-23
Payer: MEDICAID

## 2023-02-23 DIAGNOSIS — G43.009 MIGRAINE WITHOUT AURA AND RESPONSIVE TO TREATMENT: Primary | ICD-10-CM

## 2023-02-23 PROCEDURE — 1159F PR MEDICATION LIST DOCUMENTED IN MEDICAL RECORD: ICD-10-PCS | Mod: CPTII,95,, | Performed by: NURSE PRACTITIONER

## 2023-02-23 PROCEDURE — 99214 PR OFFICE/OUTPT VISIT, EST, LEVL IV, 30-39 MIN: ICD-10-PCS | Mod: 95,,, | Performed by: NURSE PRACTITIONER

## 2023-02-23 PROCEDURE — 1160F PR REVIEW ALL MEDS BY PRESCRIBER/CLIN PHARMACIST DOCUMENTED: ICD-10-PCS | Mod: CPTII,95,, | Performed by: NURSE PRACTITIONER

## 2023-02-23 PROCEDURE — 99214 OFFICE O/P EST MOD 30 MIN: CPT | Mod: 95,,, | Performed by: NURSE PRACTITIONER

## 2023-02-23 PROCEDURE — 1160F RVW MEDS BY RX/DR IN RCRD: CPT | Mod: CPTII,95,, | Performed by: NURSE PRACTITIONER

## 2023-02-23 PROCEDURE — 1159F MED LIST DOCD IN RCRD: CPT | Mod: CPTII,95,, | Performed by: NURSE PRACTITIONER

## 2023-02-23 RX ORDER — SUMATRIPTAN SUCCINATE 100 MG/1
100 TABLET ORAL EVERY 12 HOURS PRN
Qty: 20 TABLET | Refills: 1 | Status: SHIPPED | OUTPATIENT
Start: 2023-02-23 | End: 2023-08-22 | Stop reason: SDUPTHER

## 2023-03-08 ENCOUNTER — HOSPITAL ENCOUNTER (OUTPATIENT)
Dept: CARDIOLOGY | Facility: HOSPITAL | Age: 43
Discharge: HOME | End: 2023-03-08
Attending: STUDENT IN AN ORGANIZED HEALTH CARE EDUCATION/TRAINING PROGRAM
Payer: MEDICAID

## 2023-03-08 DIAGNOSIS — I10 HYPERTENSION, UNSPECIFIED TYPE: ICD-10-CM

## 2023-04-04 ENCOUNTER — PATIENT MESSAGE (OUTPATIENT)
Dept: RESEARCH | Facility: HOSPITAL | Age: 43
End: 2023-04-04
Payer: MEDICAID

## 2023-04-21 RX ORDER — VENLAFAXINE HYDROCHLORIDE 150 MG/1
CAPSULE, EXTENDED RELEASE ORAL
COMMUNITY
Start: 2023-03-03 | End: 2023-04-25

## 2023-04-24 NOTE — PROGRESS NOTES
Patient Name: Abdi Combs  MRN: [id number]              [facility]    Subjective:        Ms. Abdi Combs is a 42 y.o. female w/ PMH hypertension, GERD, anxiety/depression, allergic rhinitis. s/p FESS 8/11/17 who comes to clinic today for follow up.  Patient has been having nonspecific abdominal pain s/p 1-2 hours after the.  Patient says this pain is chronic and last.  Patient was hospitalized in May and was found to have edematous pancreatitis patient underwent MRCP with no underlying blockage.  Patient's triglyceride and does not have any alcohol use, patient's pancreatitis was then attributed to lisinopril even otherwise noted chronic changes noted.      7/2022 Patient has been miserable she says she is scared of eating, she says that she has only been eating bananas because she says that it is good for your stomach and causes less pain.  Patient is still having pain.  Patient also says that she also been having some migraines which have been intermittent, she says she does not have any migraines at this time and she has an appointment later this month when Neurology.     10/2022:  Patient says that her chest pain is much improved, but she does have some chest pain that radiates to her back similar to the  pain when she had her pancreatitis.  Patient says that this is seldomly, approximately 1-2 times per month.  These episodes only last approximately 15 minutes.  Patient says that she is able to eat without any issues, except for things that or spicy.  Patient's migraines are much improved s/p treatment.     04/2023:   -patient is having issues with anxiety, says that she is unable to sleep.  She is taking Klonopin for her anxiety.  She says that she had been taking some antidepressant in the past however her doctor has put her on Ativan and now currently on Klonopin.  Her main complaint today is knee pain which she says has gotten worse, she has continued to gain weight and  currently has BMI 47.55 she is not heard back from the surgeons for possible knee replacement.  She has not done physical therapy at all.    Review of Systems:  The remainder of the 14 system ROS is noncontributory or negative unless mentioned/reviewed above.    Objective:     Vital Signs:  Vitals:    04/25/23 1312   BP: 136/87   Pulse: 68   Resp: 20   Temp: 97.9 °F (36.6 °C)        Body mass index is 47.55 kg/m².     General:  Well developed, well nourished, no acute respiratory distress  Head: Normocephalic, atraumatic  Eyes: PERRL, anicteric sclera  Throat: No posterior pharyngeal erythema or exudate, no tonsillar exudate  CVS:  RRR, S1 and S2 normal, no murmurs, no added heart sounds, rubs, gallops, regular peripheral pulses, and no peripheral edema  Resp:  Lungs clear to auscultation bilaterally, no wheezes, rales, or rhonci  GI:  Abdomen soft, non-tender  MSK:  Full range of motion, no obvious deformities joint crepitus in left knee  Skin:  No rashes, ulcers, erythema  Neuro:  Alert and oriented x3, No focal neuro deficits, CNII-XII grossly intact  Psych:  Appropriate mood and affect         Laboratory:  Lab Results   Component Value Date    WBC 8.2 02/02/2023    HGB 13.3 02/02/2023    HCT 41.0 02/02/2023     02/02/2023    MCV 75.6 (L) 02/02/2023    RDW 15.5 02/02/2023    IRON 41 (L) 02/02/2023    TIBC 241 (L) 02/02/2023    FERRITIN 48.50 02/02/2023     Lab Results   Component Value Date    HGBA1C 5.4 05/22/2022    .8 09/08/2021    CREATININE 0.71 02/02/2023    Lab Results   Component Value Date     02/02/2023    K 3.5 02/02/2023    CHLORIDE 102 02/02/2023    CO2 28 02/02/2023    BUN 8.5 02/02/2023    CREATININE 0.71 02/02/2023    CALCIUM 9.5 02/02/2023    MG 1.90 02/02/2023    PHOS 4.4 02/02/2023     Lab Results   Component Value Date    TSH 1.453 02/02/2023    [id number] 1.06 02/02/2023            Current Medications:  Current Outpatient Medications   Medication Instructions    buPROPion  (WELLBUTRIN SR) 150 mg, Oral, 2 times daily    cetirizine (ZYRTEC) 10 mg, Oral, Daily    cholecalciferol (vitamin D3) (VITAMIN D3) 1,000 Units, Oral, Daily    clonazePAM (KLONOPIN) 2 mg, Oral, Daily    estradiol 0.05 mg/24 hr td ptwk (ESTRADIOL TRANSDERMAL PATCH) 0.05 mg/24 hr PTWK 1 patch, Transdermal, Every 7 days    ferrous sulfate 324 mg, Oral, Every other day    fluticasone propionate (FLONASE) 50 mcg, Each Nostril, 2 times daily    hydroCHLOROthiazide (HYDRODIURIL) 25 mg, Oral, Daily    ibuprofen (ADVIL,MOTRIN) 600 mg, Oral, 3 times daily    naltrexone capsule 9 mg, Oral, Daily    NIFEdipine (PROCARDIA-XL) 90 mg, Oral, Daily    pantoprazole (PROTONIX) 40 mg, Oral, Daily    QUEtiapine (SEROQUEL) 200 mg, Oral, Nightly    sumatriptan (IMITREX) 100 mg, Oral, Every 12 hours PRN    zolpidem (AMBIEN) 10 mg, Oral, Nightly PRN          2D ECHO Results  No results found for this or any previous visit.      Pulmonary Functions Testing Results:    No results found for: FEV1, FVC, [id number], TLC, DLCO  Assessment and Plan:        Health Maintenance Due   Topic Date Due    COVID-19 Vaccine (3 - Booster for Moderna series) 09/02/2021    Influenza Vaccine (1) 09/01/2022      Migraines:   -Procardia and hydrochlorothiazide    Allergies:  -cetirizine     Anxiety:  -I have explained the risks of abuse potential of Klonopin daily, patient is amenable to retry bupropion as can help with her weight as well     Osteoarthritis of left knee,   will get x-ray of her knee  Ibuprofen p.r.n. and Protonix    Morbid obesity:  -start naltrexone and bupropion          Griselda Carroll MD  Internal Medicine PGY-II

## 2023-04-25 ENCOUNTER — OFFICE VISIT (OUTPATIENT)
Dept: INTERNAL MEDICINE | Facility: CLINIC | Age: 43
End: 2023-04-25
Payer: MEDICAID

## 2023-04-25 VITALS
HEIGHT: 67 IN | HEART RATE: 68 BPM | TEMPERATURE: 98 F | DIASTOLIC BLOOD PRESSURE: 87 MMHG | BODY MASS INDEX: 45.99 KG/M2 | WEIGHT: 293 LBS | SYSTOLIC BLOOD PRESSURE: 136 MMHG | OXYGEN SATURATION: 99 % | RESPIRATION RATE: 20 BRPM

## 2023-04-25 DIAGNOSIS — I10 HYPERTENSION, UNSPECIFIED TYPE: ICD-10-CM

## 2023-04-25 PROCEDURE — 99215 OFFICE O/P EST HI 40 MIN: CPT | Mod: PBBFAC

## 2023-04-25 RX ORDER — BUPROPION HYDROCHLORIDE 150 MG/1
150 TABLET, EXTENDED RELEASE ORAL 2 TIMES DAILY
Qty: 60 TABLET | Refills: 11 | Status: SHIPPED | OUTPATIENT
Start: 2023-04-25 | End: 2023-11-09 | Stop reason: SDUPTHER

## 2023-04-25 RX ORDER — VIT C/E/ZN/COPPR/LUTEIN/ZEAXAN 250MG-90MG
1000 CAPSULE ORAL DAILY
Qty: 90 CAPSULE | Refills: 1 | Status: SHIPPED | OUTPATIENT
Start: 2023-04-25 | End: 2023-09-12 | Stop reason: SDUPTHER

## 2023-04-25 RX ORDER — NIFEDIPINE 90 MG/1
90 TABLET, EXTENDED RELEASE ORAL DAILY
Qty: 90 TABLET | Refills: 1 | Status: SHIPPED | OUTPATIENT
Start: 2023-04-25 | End: 2024-01-23 | Stop reason: SDUPTHER

## 2023-04-25 RX ORDER — HYDROCHLOROTHIAZIDE 25 MG/1
25 TABLET ORAL DAILY
Qty: 90 TABLET | Refills: 1 | Status: SHIPPED | OUTPATIENT
Start: 2023-04-25 | End: 2023-04-26

## 2023-04-25 RX ORDER — IBUPROFEN 600 MG/1
600 TABLET ORAL 3 TIMES DAILY
Qty: 90 TABLET | Refills: 0 | Status: SHIPPED | OUTPATIENT
Start: 2023-04-25 | End: 2023-05-25

## 2023-04-25 RX ORDER — CETIRIZINE HYDROCHLORIDE 10 MG/1
10 TABLET ORAL DAILY
Qty: 90 TABLET | Refills: 1 | Status: SHIPPED | OUTPATIENT
Start: 2023-04-25 | End: 2023-08-22

## 2023-04-25 RX ORDER — FERROUS SULFATE 324(65)MG
324 TABLET, DELAYED RELEASE (ENTERIC COATED) ORAL EVERY OTHER DAY
Qty: 45 TABLET | Refills: 1 | Status: SHIPPED | OUTPATIENT
Start: 2023-04-25 | End: 2023-09-11 | Stop reason: SDUPTHER

## 2023-04-25 RX ORDER — FERROUS SULFATE 324(65)MG
324 TABLET, DELAYED RELEASE (ENTERIC COATED) ORAL EVERY OTHER DAY
Qty: 45 TABLET | Refills: 1 | Status: SHIPPED | OUTPATIENT
Start: 2023-04-25 | End: 2023-04-25 | Stop reason: SDUPTHER

## 2023-04-25 RX ORDER — PANTOPRAZOLE SODIUM 40 MG/1
40 TABLET, DELAYED RELEASE ORAL DAILY
Qty: 30 TABLET | Refills: 11 | Status: SHIPPED | OUTPATIENT
Start: 2023-04-25 | End: 2024-01-23 | Stop reason: SDUPTHER

## 2023-04-25 NOTE — PROGRESS NOTES
I have reviewed and concur with the resident's history, physical, assessment, and plan.  I have discussed with him all issues related to the diagnosis, workup and treatment plan. Care provided as reasonable and necessary.    Isai Do MD  [facility]

## 2023-04-26 ENCOUNTER — TELEPHONE (OUTPATIENT)
Dept: INTERNAL MEDICINE | Facility: CLINIC | Age: 43
End: 2023-04-26
Payer: MEDICAID

## 2023-04-26 ENCOUNTER — HOSPITAL ENCOUNTER (OUTPATIENT)
Dept: RADIOLOGY | Facility: HOSPITAL | Age: 43
Discharge: HOME | End: 2023-04-26
Attending: STUDENT IN AN ORGANIZED HEALTH CARE EDUCATION/TRAINING PROGRAM
Payer: MEDICAID

## 2023-04-26 DIAGNOSIS — I10 HYPERTENSION, UNSPECIFIED TYPE: ICD-10-CM

## 2023-04-26 DIAGNOSIS — R52 PAIN: ICD-10-CM

## 2023-04-26 PROCEDURE — 73560 X-RAY EXAM OF KNEE 1 OR 2: CPT | Mod: TC,RT

## 2023-04-26 PROCEDURE — 73560 X-RAY EXAM OF KNEE 1 OR 2: CPT | Mod: TC,LT

## 2023-04-26 RX ORDER — CHLORTHALIDONE 25 MG/1
12.5 TABLET ORAL DAILY
Qty: 15 TABLET | Refills: 11 | Status: SHIPPED | OUTPATIENT
Start: 2023-04-26 | End: 2023-08-22

## 2023-04-26 NOTE — TELEPHONE ENCOUNTER
Pharmacist called stating the pt HTZ 25 mg tab is not covered under pt insurance and requesting an alternative med. Please Advise

## 2023-04-27 ENCOUNTER — TELEPHONE (OUTPATIENT)
Dept: INTERNAL MEDICINE | Facility: CLINIC | Age: 43
End: 2023-04-27
Payer: MEDICAID

## 2023-04-27 NOTE — TELEPHONE ENCOUNTER
Contacted patient ID and  verified. Patient informed of rx change due to insurance no longer covering HCTZ rx. Patient verbalized complete understanding,

## 2023-05-02 NOTE — TELEPHONE ENCOUNTER
Pt called, name and  verified. Pt asked about paper Rx she received from appointment with Dr. Carroll. Pt reported she never received the paper Rx. Pt is requesting paper Rx ASAP . Please review and advise.

## 2023-05-09 NOTE — TELEPHONE ENCOUNTER
Placed call to [facility] pharmacy and spoke to pharmacist stating Naltrexone rx was dispensed to patient on 5/7/2023.

## 2023-06-21 ENCOUNTER — PATIENT MESSAGE (OUTPATIENT)
Dept: ADMINISTRATIVE | Facility: HOSPITAL | Age: 43
End: 2023-06-21
Payer: MEDICAID

## 2023-07-11 ENCOUNTER — PATIENT MESSAGE (OUTPATIENT)
Dept: ADMINISTRATIVE | Facility: HOSPITAL | Age: 43
End: 2023-07-11
Payer: MEDICAID

## 2023-08-17 ENCOUNTER — PATIENT OUTREACH (OUTPATIENT)
Dept: ADMINISTRATIVE | Facility: HOSPITAL | Age: 43
End: 2023-08-17
Payer: MEDICAID

## 2023-08-17 DIAGNOSIS — Z12.11 SCREENING FOR MALIGNANT NEOPLASM OF COLON: Primary | ICD-10-CM

## 2023-08-17 NOTE — PROGRESS NOTES
Message sent to patient via [facility] patient portal as part of Population Health Bulk Outreach for colorectal cancer screening. Pt requesting cologuard order. Order entered per written order guidelines and sent to pcp for signature. Pt notified.    Population Health Chart Review & Patient Outreach Details:     Reason for Outreach Encounter:     []  Non-Compliant Report   []  Payor Report ([employer], PHN, BCBS, MSSP, MCIP, UHC, etc.)   []  Pre-Visit Chart Review     Updates Requested / Reviewed:     [x]  Care Everywhere    [x]     []  External Sources ([employer], [employer], [employer], etc.)   [x]  Care Team Updated    Patient Outreach Method:    []  Telephone Outreach Completed   [] Successful   [] Left Voicemail   [] Unable to Contact (wrong number, no voicemail)  [x]  [facility] Outreach Sent  []  Letter Outreach Mailed  []  Fax Sent for External Records  []  External Records Upload    Health Maintenance Topics Addressed and Outreach Outcomes / Actions Taken:        []      Breast Cancer Screening []  Mammo Scheduled      []  External Records Requested     []  Added Reminder to Complete to Upcoming Primary Care Appt Notes     []  Patient Declined     []  Patient Will Call Back to Schedule     []  Patient Will Schedule with External Provider / Order Routed if Applicable             []       Cervical Cancer Screening []  Pap Scheduled      []  External Records Requested     []  Added Reminder to Complete to Upcoming Primary Care Appt Notes     []  Patient Declined     []  Patient Will Call Back to Schedule     []  Patient Will Schedule with External Provider               [x]          Colorectal Cancer Screening [x]  Cologuard ordered     []  External Records Requested     []  Added Reminder to Complete to Upcoming Primary Care Appt Notes     []  Patient Declined     []  Patient Will Call Back to Schedule     []  Patient Will Schedule with External Provider     []  Fit Kit Mailed (add the SmartPhrase under  additional notes)     []  Reminded Patient to Complete Home Test             []      Diabetic Eye Exam []  Eye Camera Scheduled or Optometry Referral Placed     []  External Records Requested     []  Added Reminder to Complete to Upcoming Primary Care Appt Notes     []  Patient Declined     []  Patient Will Call Back to Schedule     []  Patient Will Schedule with External Provider             []      Blood Pressure Control []  Primary Care Follow Up Visit Scheduled     []  Remote Blood Pressure Reading Captured     []  Added Reminder to Complete to Upcoming Primary Care Appt Notes     []  Patient Declined     []  Patient Will Call Back / Patient Will Send Portal Message with Reading     []  Patient Will Call Back to Schedule Provider Visit             []       HbA1c & Other Labs []  Lab Appt Scheduled for Due Labs     []  Primary Care Follow Up Visit Scheduled      []  Reminded Patient to Complete Home Test     []  Added Reminder to Complete to Upcoming Primary Care Appt Notes     []  Patient Declined     []  Patient Will Call Back to Schedule     []  Patient Will Schedule with External Provider / Order Routed if Applicable           []    Schedule Primary Care Appt []  Primary Care Appt Scheduled     []  Patient Declined     []  Patient Will Call Back to Schedule     []  Pt Established with External Provider & Updated Care Team             []      Medication Adherence []  Primary Care Appointment Scheduled     []  Added Reminder to Upcoming Primary Care Appt Notes     []  Patient Reminded to  Prescription     []  Patient Declined, Provider Notified if Needed     []  Sent Provider Message to Review and/or Add Exclusion to Problem List             []      Osteoporosis Screening []  DXA Appointment Scheduled     []  External Records Requested     []  Added Reminder to Complete to Upcoming Primary Care Appt Notes     []  Patient Declined     []  Patient Will Call Back to Schedule     []  Patient Will Schedule  with External Provider / Order Routed if Applicable     Additional Care Coordinator Notes:         Further Action Needed If Patient Returns Outreach:

## 2023-08-21 NOTE — PROGRESS NOTES
[facility] Neurology Audio Only Follow Up Visit Note    This is a real-time audio only  visit that was performed with the originating site at patient's home and the distant site, [facility]. Verbal consent to participate in interactive audio visit was obtained.    I discussed with the patient regarding the nature of our telehealth visits, that:    - Our sessions are not being recorded and that personal health information is protected  - Provider would evaluate the patient and recommend diagnostics and treatments based on my assessment  - [facility] will provide follow up care in person if/when the patient needs it.     Subjective:      Patient ID: Abdi Combs is a 43 y.o. female.    Chief Complaint: Migraine (Pt reports she had been doing well, however currently this past month she has had approx. 4 migraines. Triptan helps.)    HPI   This is a 43 year old Right Handed F with PMHX of HTN, Depression, Anxiety, hysterectomy, LAURA on CPAP was referred for Migraine w/ status migrainosus. She was last seen on 2/23/2023. During that visit, Sumatriptan 100mg daily PRN was continued.     Today, Pt states her migraines have increased this past month due to increased stress. Son going back to college and started a new position at work. Prior to this month, had not had any migraine HA. Well controlled with current medication regimen. Last migraine last week, sumatriptan effective. Averages <1/month. Is currently utilizing CPAP, and awakens refreshed, no longer experiencing daytime somnolence. Currently followed by cardiologist or heart palpitations. Not sure of current medications.    Age of Onset - around the time of puberty    Semiology - bifrontal, pounding, 10/10, lasting 1 day, w/ nausea and photophobia.    Frequency - <1 headache days/month    Risk Factors -  - Family history of headache disorder? Mom with headache  - History of Head  Trauma? denied  - History of CNS infection? denied  - History of underlying mood disorder? anxiety, depression.  - Illicit drug use? denied  - Tobacco use? denied  - History of sleep disorder? moderate LAURA. Started on CPAP in 9/2020. Sleeping better with CPAP.    Workup -  - MRI Brain: denied  - [facility]CT: 6/8/2021 - no acute intracranial abnormality identified    Current ppx meds -  Procardia XL 90mg     Current abortive meds -  Naproxen prn - BID  Sumatriptan 100mg PO Qday PRN    Prior ppx meds -  Elavil [id number] - ineffective  TPM 100mg daily (1/2018 - 4/2018) - ineffective  Lexapro (stopped 2/26/2020)  Effexor 150mg daily (5/27/2020 - 6/27/2020) - dizziness  Ajovy once per month (11/16/2020 - 8/16/2021) - ineffective  Lisinopril 10mg daily    Current abortive meds -  Sumatriptan 100mg Oral TAB PRN - effective    Prior abortive meds -   Compazine - dystonia  Fioricet prn - only partially effective. Has rebound headaches afterwards.    Results for orders placed or performed in visit on 02/02/23   TSH   Result Value Ref Range    Thyroid Stimulating Hormone 1.453 0.350 - 4.940 uIU/mL   T4, Free   Result Value Ref Range    Thyroxine Free 1.06 0.70 - 1.48 ng/dL   Comprehensive Metabolic Panel   Result Value Ref Range    Sodium Level 139 136 - 145 mmol/L    Potassium Level 3.5 3.5 - 5.1 mmol/L    Chloride 102 98 - 107 mmol/L    Carbon Dioxide 28 22 - 29 mmol/L    Glucose Level 100 74 - 100 mg/dL    Blood Urea Nitrogen 8.5 7.0 - 18.7 mg/dL    Creatinine 0.71 0.55 - 1.02 mg/dL    Calcium Level Total 9.5 8.4 - 10.2 mg/dL    Protein Total 7.5 6.4 - 8.3 gm/dL    Albumin Level 3.4 (L) 3.5 - 5.0 g/dL    Globulin 4.1 (H) 2.4 - 3.5 gm/dL    Albumin/Globulin Ratio 0.8 (L) 1.1 - 2.0 ratio    Bilirubin Total 0.3 <=1.5 mg/dL    Alkaline Phosphatase 76 40 - 150 unit/L    Alanine Aminotransferase 14 0 - 55 unit/L    Aspartate Aminotransferase 14 5 - 34 unit/L    eGFR >60 mls/min/1.73/m2   Magnesium   Result Value Ref Range    Magnesium  Level 1.90 1.60 - 2.60 mg/dL   Phosphorus   Result Value Ref Range    Phosphorus Level 4.4 2.3 - 4.7 mg/dL   Vitamin D   Result Value Ref Range    Vit D 25 OH 26.1 (L) 30.0 - 80.0 ng/mL   Iron and TIBC   Result Value Ref Range    Iron Binding Capacity Unsaturated 200 70 - 310 ug/dL    Iron Level 41 (L) 50 - 170 ug/dL    Transferrin 218 180 - 382 mg/dL    Iron Binding Capacity Total 241 (L) 250 - 450 ug/dL    Iron Saturation 17 (L) 20 - 50 %   Ferritin   Result Value Ref Range    Ferritin Level 48.50 4.63 - 204.00 ng/mL   CBC with Differential   Result Value Ref Range    WBC 8.2 4.5 - 11.5 x10(3)/mcL    RBC 5.42 (H) 4.20 - 5.40 x10(6)/mcL    Hgb 13.3 12.0 - 16.0 gm/dL    Hct 41.0 37.0 - 47.0 %    MCV 75.6 (L) 80.0 - 94.0 fL    MCH 24.5 pg    MCHC 32.4 (L) 33.0 - 36.0 mg/dL    RDW 15.5 11.5 - 17.0 %    Platelet 244 130 - 400 x10(3)/mcL    MPV 10.4 7.4 - 10.4 fL    Neut % 58.7 %    Lymph % 29.2 %    Mono % 10.4 %    Eos % 1.2 %    Basophil % 0.4 %    Lymph # 2.39 0.6 - 4.6 x10(3)/mcL    Neut # 4.80 2.1 - 9.2 x10(3)/mcL    Mono # 0.85 0.1 - 1.3 x10(3)/mcL    Eos # 0.10 0 - 0.9 x10(3)/mcL    Baso # 0.03 0 - 0.2 x10(3)/mcL    IG# 0.01 0 - 0.04 x10(3)/mcL    IG% 0.1 %       Review of Systems    Constitutional: no fever, fatigue, weakness  Eye: no vision loss, eye redness, drainage, or pain  ENMT: no sore throat, ear pain, sinus pain/congestion, nasal congestion/drainage  Respiratory: no cough, no wheezing, no shortness of breath  Cardiovascular: no chest pain, no palpitations, no edema  Gastrointestinal: no nausea, vomiting, or diarrhea. No abdominal pain  Genitourinary: no dysuria, no urinary frequency or urgency, no hematuria  Hema/Lymph: no abnormal bruising or bleeding  Endocrine: no heat or cold intolerance, no excessive thirst or excessive urination  Musculoskeletal: no muscle or joint pain, no joint swelling  Integumentary: no skin rash or abnormal lesion  Psychiatric: no depressed mood, no anxiety, no  visual/auditory hallucinations, no delusions, no suicidal or homicidal ideation  Neurologic: as per HPI    Objective:      Physical Exam    General: in no acute distress  Eye: unable to assess due to nature of visit  HENT: unable to assess due to nature of visit  Neck: unable to assess due to nature of visit  Respiratory: no distress noted  Cardiovascular: unable to assess due to nature of visit   Gastrointestinal: unable to assess due to nature of visit  Musculoskeletal: unable to assess due to nature of visit  Integumentary: unable to assess due to nature of visit    Neurologic:  Mental Status- Alert and Oriented to time, self, place, Speech is fluent, with intact repetition, naming, reading, and comprehension, No Dysarthria.  CN I-XII - unable to assess due to nature of visit w/exception of CN VII, hearing grossly intact  Motor - unable to assess due to nature of visit  Sensory - unable to assess due to nature of visit  Reflexes - unable to assess due to nature of visit  Cerebellar Exam - unable to assess due to nature of visit  Gait - unable to assess due to nature of visit    Assessment:       This is a 42 year old Right Handed F with PMHX of HTN, Depression, Anxiety, hysterectomy, LAURA on CPAP (not currently), with Migraine w/ status migrainosus, stable with relative headache freedom. Had a history of Allyson Hunt Syndrome in 12/2020. Utilizing CPAP and feels refreshed upon awakening. Recent increase in migraine this past month due to increased stress. Pleased with current results. Followed by cardiology for heart palpitations. Migraines <1/month on average. Cannot recall last migraine.    1. Migraine without aura and responsive to treatment    2. Obstructive sleep apnea syndrome    3. Insomnia, unspecified type    4. LAURA on CPAP        Plan:         [] c/w Sumatriptan 100mg daily PO prn  [] Pt to make a list of medication and take with her to all doctor's visits    RTC 6 months    Headache education provided -  including good sleep hygiene, stress management, medication overuse education provided - using more 3 OTC per week may worsen headaches, High intensity interval training has shown to reduce headache frequency. Low carb, high protein has shown to reduce headache frequency. Patient is instructed to keep headache diary.    I have explained the treatment plan, diagnosis, and prognosis to the patient, caretaker, family. All questions are answered to the best of my knowledge.    Face to Face Time 30 minute, including, counseling, education, review of test results, relevant medical records, and coordination of care.

## 2023-08-22 ENCOUNTER — OFFICE VISIT (OUTPATIENT)
Dept: NEUROLOGY | Facility: CLINIC | Age: 43
End: 2023-08-22
Payer: MEDICAID

## 2023-08-22 DIAGNOSIS — G47.00 INSOMNIA, UNSPECIFIED TYPE: ICD-10-CM

## 2023-08-22 DIAGNOSIS — G47.33 OBSTRUCTIVE SLEEP APNEA SYNDROME: ICD-10-CM

## 2023-08-22 DIAGNOSIS — G43.009 MIGRAINE WITHOUT AURA AND RESPONSIVE TO TREATMENT: Primary | ICD-10-CM

## 2023-08-22 DIAGNOSIS — G47.33 OSA ON CPAP: ICD-10-CM

## 2023-08-22 PROCEDURE — 99443 PR PHYSICIAN TELEPHONE EVALUATION 21-30 MIN: ICD-10-PCS | Mod: 95,,, | Performed by: NURSE PRACTITIONER

## 2023-08-22 PROCEDURE — 99443 PR PHYSICIAN TELEPHONE EVALUATION 21-30 MIN: CPT | Mod: 95,,, | Performed by: NURSE PRACTITIONER

## 2023-08-22 PROCEDURE — 1160F RVW MEDS BY RX/DR IN RCRD: CPT | Mod: CPTII,95,, | Performed by: NURSE PRACTITIONER

## 2023-08-22 PROCEDURE — 1159F MED LIST DOCD IN RCRD: CPT | Mod: CPTII,95,, | Performed by: NURSE PRACTITIONER

## 2023-08-22 PROCEDURE — 1159F PR MEDICATION LIST DOCUMENTED IN MEDICAL RECORD: ICD-10-PCS | Mod: CPTII,95,, | Performed by: NURSE PRACTITIONER

## 2023-08-22 PROCEDURE — 1160F PR REVIEW ALL MEDS BY PRESCRIBER/CLIN PHARMACIST DOCUMENTED: ICD-10-PCS | Mod: CPTII,95,, | Performed by: NURSE PRACTITIONER

## 2023-08-22 RX ORDER — FLUTICASONE PROPIONATE 50 MCG
1 SPRAY, SUSPENSION (ML) NASAL DAILY PRN
COMMUNITY

## 2023-08-22 RX ORDER — VENLAFAXINE HYDROCHLORIDE 150 MG/1
150 CAPSULE, EXTENDED RELEASE ORAL 2 TIMES DAILY
COMMUNITY
Start: 2023-07-27 | End: 2024-02-27

## 2023-08-22 RX ORDER — HYDROCHLOROTHIAZIDE 25 MG/1
25 TABLET ORAL DAILY
COMMUNITY
Start: 2023-07-23 | End: 2023-11-27 | Stop reason: SDUPTHER

## 2023-08-22 RX ORDER — SUMATRIPTAN SUCCINATE 100 MG/1
100 TABLET ORAL EVERY 12 HOURS PRN
Qty: 20 TABLET | Refills: 1 | Status: SHIPPED | OUTPATIENT
Start: 2023-08-22 | End: 2023-09-11 | Stop reason: SDUPTHER

## 2023-09-01 ENCOUNTER — TELEPHONE (OUTPATIENT)
Dept: INTERNAL MEDICINE | Facility: CLINIC | Age: 43
End: 2023-09-01
Payer: MEDICAID

## 2023-09-01 DIAGNOSIS — E66.01 MORBID OBESITY: Primary | ICD-10-CM

## 2023-09-01 RX ORDER — NALTREXONE HYDROCHLORIDE 50 MG/1
50 TABLET, FILM COATED ORAL DAILY
Qty: 30 TABLET | Refills: 0 | Status: SHIPPED | OUTPATIENT
Start: 2023-09-01 | End: 2023-10-01

## 2023-09-01 NOTE — TELEPHONE ENCOUNTER
Pt has called multiple times for her Naltrexone refill. Pt has been trying to get this med since July. Rx was sent multiple times to pharmacy. I reached out to pharmacist and he states Naltrexone does not come in 9mg capsule. Naltrexone only comes in 50mg tablets. Please resend with correct dosage,

## 2023-09-06 NOTE — TELEPHONE ENCOUNTER
Contacted patient ID and  verified. Patient informed Naltrexone 50 mg rx was sent to [facility]'s pharmacy and prior authorization for rx was done.Patient verbalized complete understanding.

## 2023-09-11 DIAGNOSIS — G43.009 MIGRAINE WITHOUT AURA AND RESPONSIVE TO TREATMENT: ICD-10-CM

## 2023-09-11 RX ORDER — SUMATRIPTAN SUCCINATE 100 MG/1
100 TABLET ORAL EVERY 12 HOURS PRN
Qty: 20 TABLET | Refills: 1 | Status: SHIPPED | OUTPATIENT
Start: 2023-09-11 | End: 2023-10-03 | Stop reason: SDUPTHER

## 2023-09-12 RX ORDER — FERROUS SULFATE 324(65)MG
324 TABLET, DELAYED RELEASE (ENTERIC COATED) ORAL EVERY OTHER DAY
Qty: 45 TABLET | Refills: 1 | Status: SHIPPED | OUTPATIENT
Start: 2023-09-12 | End: 2024-02-27

## 2023-09-13 RX ORDER — VIT C/E/ZN/COPPR/LUTEIN/ZEAXAN 250MG-90MG
1000 CAPSULE ORAL DAILY
Qty: 90 CAPSULE | Refills: 1 | Status: SHIPPED | OUTPATIENT
Start: 2023-09-13 | End: 2024-03-04

## 2023-10-03 DIAGNOSIS — G43.009 MIGRAINE WITHOUT AURA AND RESPONSIVE TO TREATMENT: ICD-10-CM

## 2023-10-04 ENCOUNTER — TELEPHONE (OUTPATIENT)
Dept: NEUROLOGY | Facility: CLINIC | Age: 43
End: 2023-10-04
Payer: MEDICAID

## 2023-10-04 RX ORDER — SUMATRIPTAN SUCCINATE 100 MG/1
100 TABLET ORAL EVERY 12 HOURS PRN
Qty: 20 TABLET | Refills: 1 | Status: SHIPPED | OUTPATIENT
Start: 2023-10-04 | End: 2023-11-07 | Stop reason: SDUPTHER

## 2023-10-04 NOTE — TELEPHONE ENCOUNTER
----- Message from Natalee Brand sent at 10/4/2023  7:57 AM CDT -----  I called to schedule pt for sooner appt per Kayleigh and she stated she is needing a refill on this medication and it can be sent to the same pharmacy as before. Pt can be reached @ [phone]    sumatriptan (IMITREX) 100 MG tablet

## 2023-11-07 ENCOUNTER — OFFICE VISIT (OUTPATIENT)
Dept: NEUROLOGY | Facility: CLINIC | Age: 43
End: 2023-11-07
Payer: MEDICAID

## 2023-11-07 DIAGNOSIS — G47.33 OSA ON CPAP: ICD-10-CM

## 2023-11-07 DIAGNOSIS — G43.009 MIGRAINE WITHOUT AURA AND RESPONSIVE TO TREATMENT: Primary | ICD-10-CM

## 2023-11-07 DIAGNOSIS — E66.01 CLASS 3 SEVERE OBESITY DUE TO EXCESS CALORIES WITH SERIOUS COMORBIDITY AND BODY MASS INDEX (BMI) OF 45.0 TO 49.9 IN ADULT: ICD-10-CM

## 2023-11-07 PROBLEM — E66.813 CLASS 3 SEVERE OBESITY DUE TO EXCESS CALORIES WITH SERIOUS COMORBIDITY AND BODY MASS INDEX (BMI) OF 45.0 TO 49.9 IN ADULT: Status: ACTIVE | Noted: 2023-11-07

## 2023-11-07 PROCEDURE — 1160F PR REVIEW ALL MEDS BY PRESCRIBER/CLIN PHARMACIST DOCUMENTED: ICD-10-PCS | Mod: CPTII,95,, | Performed by: NURSE PRACTITIONER

## 2023-11-07 PROCEDURE — 99214 PR OFFICE/OUTPT VISIT, EST, LEVL IV, 30-39 MIN: ICD-10-PCS | Mod: 95,,, | Performed by: NURSE PRACTITIONER

## 2023-11-07 PROCEDURE — 1160F RVW MEDS BY RX/DR IN RCRD: CPT | Mod: CPTII,95,, | Performed by: NURSE PRACTITIONER

## 2023-11-07 PROCEDURE — 1159F MED LIST DOCD IN RCRD: CPT | Mod: CPTII,95,, | Performed by: NURSE PRACTITIONER

## 2023-11-07 PROCEDURE — 99214 OFFICE O/P EST MOD 30 MIN: CPT | Mod: 95,,, | Performed by: NURSE PRACTITIONER

## 2023-11-07 PROCEDURE — 1159F PR MEDICATION LIST DOCUMENTED IN MEDICAL RECORD: ICD-10-PCS | Mod: CPTII,95,, | Performed by: NURSE PRACTITIONER

## 2023-11-07 RX ORDER — KETOROLAC TROMETHAMINE 10 MG/1
10 TABLET, FILM COATED ORAL 2 TIMES DAILY
Qty: 30 TABLET | Refills: 0 | Status: SHIPPED | OUTPATIENT
Start: 2023-11-07 | End: 2024-02-27

## 2023-11-07 RX ORDER — SUMATRIPTAN SUCCINATE 100 MG/1
100 TABLET ORAL EVERY 12 HOURS PRN
Qty: 12 TABLET | Refills: 2 | Status: SHIPPED | OUTPATIENT
Start: 2023-11-07 | End: 2024-01-10 | Stop reason: SDUPTHER

## 2023-11-07 RX ORDER — GABAPENTIN 300 MG/1
300 CAPSULE ORAL NIGHTLY
Qty: 30 CAPSULE | Refills: 4 | Status: SHIPPED | OUTPATIENT
Start: 2023-11-07 | End: 2024-02-27

## 2023-11-07 NOTE — PROGRESS NOTES
[facility] Telemedicine Follow Up Visit Note    This is a real-time audio/video visit that was performed with the originating site at patient's home and the distant site, [facility]. Verbal consent to participate in interactive audio & video visit was obtained.    I discussed with the patient regarding the nature of our telehealth visits, that:    - Our sessions are not being recorded and that personal health information is protected  - Provider would evaluate the patient and recommend diagnostics and treatments based on my assessment  - [facility] will provide follow up care in person if/when the patient needs it.       Subjective:      Patient ID: Abdi Combs is a 43 y.o. female.    Chief Complaint: Migraine (Patient states she is still having migraines and having 3-4 in week. The migraines last a couple days at a time. The medication Sumatriptan is helpful. When she has the migraines they become debilitating. )    HPI   This is a 43 year old Right Handed F with PMHX of HTN, Depression, Anxiety, hysterectomy, LAURA on CPAP was referred for Migraine w/ status migrainosus. She was last seen on 8/22/2023. During that visit, Sumatriptan 100mg daily PRN was continued.     Today, Pt states her migraines have increased to 3-4 weekly due to increased stress and lack of sleep. Increased in September after several deaths in the family. Sumatriptan effective as abortive therapy, but ran out. Utilizing CPAP machine nightly, but feels like hose may be leaking. Was dx by sleep center in [place] [facility].     Age of Onset - around the time of puberty    Semiology - bifrontal, pounding, 10/10, lasting 1 day, w/ nausea and photophobia; .    Frequency - <1 headache days/month; every other day for last month    Risk Factors -  - Family history of headache disorder? Mom with headache  - History of Head Trauma? denied  -  History of CNS infection? denied  - History of underlying mood disorder? anxiety, depression.  - Illicit drug use? denied  - Tobacco use? denied  - History of sleep disorder? moderate LAURA. Started on CPAP in 9/2020. Sleeping better with CPAP.    Workup -  - MRI Brain: denied  - [facility]CT: 6/8/2021 - no acute intracranial abnormality identified    Current ppx meds -  Procardia XL 90mg   Venlafaxine XR 150mg BID    Current abortive meds -  Naproxen prn - BID  Sumatriptan 100mg PO Qday PRN    Prior ppx meds -  Elavil [id number] - ineffective  TPM 100mg daily (1/2018 - 4/2018) - ineffective  Lexapro (stopped 2/26/2020)  Effexor 150mg daily (5/27/2020 - 6/27/2020) - dizziness  Ajovy once per month (11/16/2020 - 8/16/2021) - ineffective  Lisinopril 10mg daily    Current abortive meds -  Sumatriptan 100mg Oral TAB PRN - effective    Prior abortive meds -   Compazine - dystonia  Fioricet prn - only partially effective. Has rebound headaches afterwards.    Results for orders placed or performed in visit on 02/02/23   TSH   Result Value Ref Range    TSH 1.453 0.350 - 4.940 uIU/mL   T4, Free   Result Value Ref Range    Thyroxine Free 1.06 0.70 - 1.48 ng/dL   Comprehensive Metabolic Panel   Result Value Ref Range    Sodium Level 139 136 - 145 mmol/L    Potassium Level 3.5 3.5 - 5.1 mmol/L    Chloride 102 98 - 107 mmol/L    Carbon Dioxide 28 22 - 29 mmol/L    Glucose Level 100 74 - 100 mg/dL    Blood Urea Nitrogen 8.5 7.0 - 18.7 mg/dL    Creatinine 0.71 0.55 - 1.02 mg/dL    Calcium Level Total 9.5 8.4 - 10.2 mg/dL    Protein Total 7.5 6.4 - 8.3 gm/dL    Albumin Level 3.4 (L) 3.5 - 5.0 g/dL    Globulin 4.1 (H) 2.4 - 3.5 gm/dL    Albumin/Globulin Ratio 0.8 (L) 1.1 - 2.0 ratio    Bilirubin Total 0.3 <=1.5 mg/dL    Alkaline Phosphatase 76 40 - 150 unit/L    Alanine Aminotransferase 14 0 - 55 unit/L    Aspartate Aminotransferase 14 5 - 34 unit/L    eGFR >60 mls/min/1.73/m2   Magnesium   Result Value Ref Range    Magnesium Level 1.90 1.60  - 2.60 mg/dL   Phosphorus   Result Value Ref Range    Phosphorus Level 4.4 2.3 - 4.7 mg/dL   Vitamin D   Result Value Ref Range    Vit D 25 OH 26.1 (L) 30.0 - 80.0 ng/mL   Iron and TIBC   Result Value Ref Range    Iron Binding Capacity Unsaturated 200 70 - 310 ug/dL    Iron Level 41 (L) 50 - 170 ug/dL    Transferrin 218 180 - 382 mg/dL    Iron Binding Capacity Total 241 (L) 250 - 450 ug/dL    Iron Saturation 17 (L) 20 - 50 %   Ferritin   Result Value Ref Range    Ferritin Level 48.50 4.63 - 204.00 ng/mL   CBC with Differential   Result Value Ref Range    WBC 8.2 4.5 - 11.5 x10(3)/mcL    RBC 5.42 (H) 4.20 - 5.40 x10(6)/mcL    Hgb 13.3 12.0 - 16.0 gm/dL    Hct 41.0 37.0 - 47.0 %    MCV 75.6 (L) 80.0 - 94.0 fL    MCH 24.5 pg    MCHC 32.4 (L) 33.0 - 36.0 mg/dL    RDW 15.5 11.5 - 17.0 %    Platelet 244 130 - 400 x10(3)/mcL    MPV 10.4 7.4 - 10.4 fL    Neut % 58.7 %    Lymph % 29.2 %    Mono % 10.4 %    Eos % 1.2 %    Basophil % 0.4 %    Lymph # 2.39 0.6 - 4.6 x10(3)/mcL    Neut # 4.80 2.1 - 9.2 x10(3)/mcL    Mono # 0.85 0.1 - 1.3 x10(3)/mcL    Eos # 0.10 0 - 0.9 x10(3)/mcL    Baso # 0.03 0 - 0.2 x10(3)/mcL    IG# 0.01 0 - 0.04 x10(3)/mcL    IG% 0.1 %       Review of Systems    Constitutional: no fever, fatigue, weakness  Eye: no vision loss, eye redness, drainage, or pain  ENMT: no sore throat, ear pain, sinus pain/congestion, nasal congestion/drainage  Respiratory: no cough, no wheezing, no shortness of breath  Cardiovascular: no chest pain, no palpitations, no edema  Gastrointestinal: no nausea, vomiting, or diarrhea. No abdominal pain  Genitourinary: no dysuria, no urinary frequency or urgency, no hematuria  Hema/Lymph: no abnormal bruising or bleeding  Endocrine: no heat or cold intolerance, no excessive thirst or excessive urination  Musculoskeletal: no muscle or joint pain, no joint swelling  Integumentary: no skin rash or abnormal lesion  Psychiatric: no depressed mood, no anxiety, no visual/auditory  hallucinations, no delusions, no suicidal or homicidal ideation  Neurologic: as per HPI    Objective:      Physical Exam    General: well-developed well-nourished in no acute distress  Eye: clear conjunctiva, eyelids normal  HENT: oropharynx without erythema/exudate, oropharynx mucosal surface moist  Neck: full range of motion  Respiratory: no distress on RA  Cardiovascular: unable to assess due to nature of visit   Gastrointestinal: round, no distension, no guarding  Musculoskeletal: full range of motion of all extremities  Integumentary: no visible rashes rashes.    Neurologic:  Mental Status- Alert and Oriented to time, self, place, Speech is fluent, with intact repetition, naming, reading, and comprehension., 3/3 in registration, 3/3 in delayed recall, No Dysarthria.  CN II-XII - CN I Deferred, unable to assess fundus, CN V1-3, visual field due to nature of visit, VA grossly normal to motion and finger counting, No ptosis b/l, EOMI w/o nystagmus, T/U midline, Shoulder shrug symmetric b/l,Face Symmetric.  Motor - unable to assess tone and confrontation due to nature of visit, bulk appears grossly normal, no gross abnormal involuntary or voluntary movements, FFM nml b/l, No visible satelliting on orbiting, No pronator drift,  Sensory - unable to assess due to nature of visit  Reflexes - unable to assess due to nature of visit  Cerebellar Exam - FNF wnl b/l no intention tremor.  Gait - Normal, good arm swing b/l.  Assessment:       This is a 42 year old Right Handed F with PMHX of HTN, Depression, Anxiety, hysterectomy, LAURA on CPAP (not currently), with Migraine w/ status migrainosus, stable with relative headache freedom. Had a history of Allyson Hunt Syndrome in 12/2020. Utilizing CPAP and feels refreshed upon awakening, but believes hose may be leaking. Testing done in [place]. Recent increase in migraine this past month due to increased stress. Sumatriptan effective as abortive therapy. Followed by cardiology  for heart palpitations.     1. Migraine without aura and responsive to treatment  - gabapentin (NEURONTIN) 300 MG capsule; Take 1 capsule (300 mg total) by mouth every evening.  Dispense: 30 capsule; Refill: 4  - sumatriptan (IMITREX) 100 MG tablet; Take 1 tablet (100 mg total) by mouth every 12 (twelve) hours as needed for Migraine.  Dispense: 12 tablet; Refill: 2  - ketorolac (TORADOL) 10 mg tablet; Take 1 tablet (10 mg total) by mouth 2 (two) times a day.  Dispense: 30 tablet; Refill: 0    2. LAURA on CPAP    3. Class 3 severe obesity due to excess calories with serious comorbidity and body mass index (BMI) of 45.0 to 49.9 in adult      Plan:         [] c/w Sumatriptan 100mg BID PRN at onset of migraine  [] start warren 300mg nightly for migraine and sleep  [] start Toradol 10mg PO BID PRN for migraine  [] call [facility] for guidance on ordering supplies  [] start exercise program for overall health and wellness    RTC 4 months - TM okay    Headache education provided - including good sleep hygiene, stress management, medication overuse education provided - using more 3 OTC per week may worsen headaches, High intensity interval training has shown to reduce headache frequency. Low carb, high protein has shown to reduce headache frequency. Patient is instructed to keep headache diary.    I have explained the treatment plan, diagnosis, and prognosis to the patient, caretaker, family. All questions are answered to the best of my knowledge.    Face to Face Time 30 minute, including, counseling, education, review of test results, relevant medical records, and coordination of care.

## 2023-11-09 ENCOUNTER — OFFICE VISIT (OUTPATIENT)
Dept: INTERNAL MEDICINE | Facility: CLINIC | Age: 43
End: 2023-11-09
Payer: MEDICAID

## 2023-11-09 DIAGNOSIS — I10 PRIMARY HYPERTENSION: ICD-10-CM

## 2023-11-09 DIAGNOSIS — E66.01 CLASS 3 SEVERE OBESITY DUE TO EXCESS CALORIES WITH SERIOUS COMORBIDITY AND BODY MASS INDEX (BMI) OF 45.0 TO 49.9 IN ADULT: Primary | ICD-10-CM

## 2023-11-09 DIAGNOSIS — G43.009 MIGRAINE WITHOUT AURA AND RESPONSIVE TO TREATMENT: ICD-10-CM

## 2023-11-09 DIAGNOSIS — F32.1 CURRENT MODERATE EPISODE OF MAJOR DEPRESSIVE DISORDER WITHOUT PRIOR EPISODE: ICD-10-CM

## 2023-11-09 NOTE — PROGRESS NOTES
Patient Name: Abdi Combs  MRN: [id number]              [facility]    Subjective:        Ms. Abdi Combs is a 42 y.o. female w/ PMH hypertension, GERD, anxiety/depression, allergic rhinitis. s/p FESS 8/11/17 who comes to clinic today for follow up.  Patient has been having nonspecific abdominal pain s/p 1-2 hours after the.  Patient says this pain is chronic and last.  Patient was hospitalized in May and was found to have edematous pancreatitis patient underwent MRCP with no underlying blockage.  Patient's triglyceride and does not have any alcohol use, patient's pancreatitis was then attributed to lisinopril even otherwise noted chronic changes noted.      7/2022 Patient has been miserable she says she is scared of eating, she says that she has only been eating bananas because she says that it is good for your stomach and causes less pain.  Patient is still having pain.  Patient also says that she also been having some migraines which have been intermittent, she says she does not have any migraines at this time and she has an appointment later this month when Neurology.     10/2022:  Patient says that her chest pain is much improved, but she does have some chest pain that radiates to her back similar to the  pain when she had her pancreatitis.  Patient says that this is seldomly, approximately 1-2 times per month.  These episodes only last approximately 15 minutes.  Patient says that she is able to eat without any issues, except for things that or spicy.  Patient's migraines are much improved s/p treatment.     04/2023:   -patient is having issues with anxiety, says that she is unable to sleep.  She is taking Klonopin for her anxiety.  She says that she had been taking some antidepressant in the past however her doctor has put her on Ativan and now currently on Klonopin.  Her main complaint today is knee pain which she says has gotten worse, she has continued to gain weight and  currently has BMI 47.55 she is not heard back from the surgeons for possible knee replacement.  She has not done physical therapy at all.    11/2023  Anxiety and depression symptoms are uncontrolled and worse at this time. Patient says that she lost her family member who was shot, she feels guilty because that she was in a meeting, and unable to see him. Denies any SI. Sleep is an issue. Patient would like contrave.         Review of Systems:  The remainder of the 14 system ROS is noncontributory or negative unless mentioned/reviewed above.    Objective:     Audio:  No objective data        Laboratory:  Lab Results   Component Value Date    WBC 8.2 02/02/2023    HGB 13.3 02/02/2023    HCT 41.0 02/02/2023     02/02/2023    MCV 75.6 (L) 02/02/2023    RDW 15.5 02/02/2023    IRON 41 (L) 02/02/2023    TIBC 241 (L) 02/02/2023    FERRITIN 48.50 02/02/2023     Lab Results   Component Value Date    HGBA1C 5.4 05/22/2022    .8 09/08/2021    CREATININE 0.71 02/02/2023    Lab Results   Component Value Date     02/02/2023    K 3.5 02/02/2023    CHLORIDE 102 02/02/2023    CO2 28 02/02/2023    BUN 8.5 02/02/2023    CREATININE 0.71 02/02/2023    CALCIUM 9.5 02/02/2023    MG 1.90 02/02/2023    PHOS 4.4 02/02/2023     Lab Results   Component Value Date    TSH 1.453 02/02/2023    [id number] 1.06 02/02/2023            Current Medications:  Current Outpatient Medications   Medication Instructions    buPROPion (WELLBUTRIN SR) 150 mg, Oral, 2 times daily    cholecalciferol (vitamin D3) (VITAMIN D3) 1,000 Units, Oral, Daily    clonazePAM (KLONOPIN) 2 mg, Oral, Daily    ferrous sulfate 324 mg, Oral, Every other day    fluticasone propionate (FLONASE) 50 mcg/actuation nasal spray 1 spray, Nasal, Daily PRN    gabapentin (NEURONTIN) 300 mg, Oral, Nightly    hydroCHLOROthiazide (HYDRODIURIL) 25 mg, Oral, Daily    ketorolac (TORADOL) 10 mg, Oral, 2 times daily    NIFEdipine (PROCARDIA-XL) 90 mg, Oral, Daily    pantoprazole  "(PROTONIX) 40 mg, Oral, Daily    sumatriptan (IMITREX) 100 mg, Oral, Every 12 hours PRN    venlafaxine (EFFEXOR-XR) 150 mg, Oral, 2 times daily          2D ECHO Results  No results found for this or any previous visit.      Pulmonary Functions Testing Results:    No results found for: "FEV1", "FVC", "XBY4KAY", "TLC", "DLCO"  Assessment and Plan:        Health Maintenance Due   Topic Date Due    Colorectal Cancer Screening  Never done    Mammogram  07/29/2023    Influenza Vaccine (1) 09/01/2023    COVID-19 Vaccine (3 - 2023-24 season) 09/01/2023      Migraines:   -Procardia and hydrochlorothiazide continue    Allergies:  -cetirizine     Anxiety:  -continue wellbutrin 150 bid  Elavil for sleep    Morbid obesity:  -raj Carroll MD  Internal Medicine PGY-II    "

## 2023-11-10 RX ORDER — NALTREXONE HYDROCHLORIDE 50 MG/1
25 TABLET, FILM COATED ORAL DAILY
Qty: 15 TABLET | Refills: 0 | Status: SHIPPED | OUTPATIENT
Start: 2023-11-10 | End: 2023-12-10

## 2023-11-10 RX ORDER — BUPROPION HYDROCHLORIDE 150 MG/1
150 TABLET, EXTENDED RELEASE ORAL 2 TIMES DAILY
Qty: 60 TABLET | Refills: 11 | Status: SHIPPED | OUTPATIENT
Start: 2023-11-10 | End: 2024-02-27 | Stop reason: SDUPTHER

## 2023-11-10 RX ORDER — AMITRIPTYLINE HYDROCHLORIDE 10 MG/1
10 TABLET, FILM COATED ORAL NIGHTLY
Qty: 30 TABLET | Refills: 0 | Status: SHIPPED | OUTPATIENT
Start: 2023-11-10 | End: 2024-02-27

## 2023-11-10 NOTE — PROGRESS NOTES
Patient Name: Abdi Combs  MRN: [id number]              [facility]    Subjective:        Ms. Abdi Combs is a 42 y.o. female w/ PMH hypertension, GERD, anxiety/depression, allergic rhinitis. s/p FESS 8/11/17 who comes to clinic today for follow up.  Patient has been having nonspecific abdominal pain s/p 1-2 hours after the.  Patient says this pain is chronic and last.  Patient was hospitalized in May and was found to have edematous pancreatitis patient underwent MRCP with no underlying blockage.  Patient's triglyceride and does not have any alcohol use, patient's pancreatitis was then attributed to lisinopril even otherwise noted chronic changes noted.      7/2022 Patient has been miserable she says she is scared of eating, she says that she has only been eating bananas because she says that it is good for your stomach and causes less pain.  Patient is still having pain.  Patient also says that she also been having some migraines which have been intermittent, she says she does not have any migraines at this time and she has an appointment later this month when Neurology.     10/2022:  Patient says that her chest pain is much improved, but she does have some chest pain that radiates to her back similar to the  pain when she had her pancreatitis.  Patient says that this is seldomly, approximately 1-2 times per month.  These episodes only last approximately 15 minutes.  Patient says that she is able to eat without any issues, except for things that or spicy.  Patient's migraines are much improved s/p treatment.     04/2023:   -patient is having issues with anxiety, says that she is unable to sleep.  She is taking Klonopin for her anxiety.  She says that she had been taking some antidepressant in the past however her doctor has put her on Ativan and now currently on Klonopin.  Her main complaint today is knee pain which she says has gotten worse, she has continued to gain weight and  currently has BMI 47.55 she is not heard back from the surgeons for possible knee replacement.  She has not done physical therapy at all.    11/2023  Anxiety and depression symptoms are uncontrolled and worse at this time. Patient says that she lost her family member who was shot, she feels guilty because that she was in a meeting, and unable to see him. Denies any SI. Sleep is an issue. Patient would like contrave.         Review of Systems:  The remainder of the 14 system ROS is noncontributory or negative unless mentioned/reviewed above.    Objective:     Audio:  No objective data        Laboratory:  Lab Results   Component Value Date    WBC 8.2 02/02/2023    HGB 13.3 02/02/2023    HCT 41.0 02/02/2023     02/02/2023    MCV 75.6 (L) 02/02/2023    RDW 15.5 02/02/2023    IRON 41 (L) 02/02/2023    TIBC 241 (L) 02/02/2023    FERRITIN 48.50 02/02/2023     Lab Results   Component Value Date    HGBA1C 5.4 05/22/2022    .8 09/08/2021    CREATININE 0.71 02/02/2023    Lab Results   Component Value Date     02/02/2023    K 3.5 02/02/2023    CHLORIDE 102 02/02/2023    CO2 28 02/02/2023    BUN 8.5 02/02/2023    CREATININE 0.71 02/02/2023    CALCIUM 9.5 02/02/2023    MG 1.90 02/02/2023    PHOS 4.4 02/02/2023     Lab Results   Component Value Date    TSH 1.453 02/02/2023    XPKVCB8TIGJ 1.06 02/02/2023            Current Medications:  Current Outpatient Medications   Medication Instructions    buPROPion (WELLBUTRIN SR) 150 mg, Oral, 2 times daily    cholecalciferol (vitamin D3) (VITAMIN D3) 1,000 Units, Oral, Daily    clonazePAM (KLONOPIN) 2 mg, Oral, Daily    ferrous sulfate 324 mg, Oral, Every other day    fluticasone propionate (FLONASE) 50 mcg/actuation nasal spray 1 spray, Nasal, Daily PRN    gabapentin (NEURONTIN) 300 mg, Oral, Nightly    hydroCHLOROthiazide (HYDRODIURIL) 25 mg, Oral, Daily    ketorolac (TORADOL) 10 mg, Oral, 2 times daily    NIFEdipine (PROCARDIA-XL) 90 mg, Oral, Daily    pantoprazole  "(PROTONIX) 40 mg, Oral, Daily    sumatriptan (IMITREX) 100 mg, Oral, Every 12 hours PRN    venlafaxine (EFFEXOR-XR) 150 mg, Oral, 2 times daily          2D ECHO Results  No results found for this or any previous visit.      Pulmonary Functions Testing Results:    No results found for: "FEV1", "FVC", "PGK8WOS", "TLC", "DLCO"  Assessment and Plan:        Health Maintenance Due   Topic Date Due    Colorectal Cancer Screening  Never done    Mammogram  07/29/2023    Influenza Vaccine (1) 09/01/2023    COVID-19 Vaccine (3 - 2023-24 season) 09/01/2023      Migraines:   -Procardia and hydrochlorothiazide continue    Allergies:  -cetirizine     Anxiety:  -continue wellbutrin 150 bid  Elavil for sleep    Morbid obesity:  -raj Carroll MD  Internal Medicine PGY-II      "

## 2023-11-16 ENCOUNTER — HOSPITAL ENCOUNTER (EMERGENCY)
Facility: HOSPITAL | Age: 43
Discharge: HOME | End: 2023-11-16
Attending: FAMILY MEDICINE
Payer: MEDICAID

## 2023-11-16 VITALS
HEIGHT: 67 IN | RESPIRATION RATE: 18 BRPM | OXYGEN SATURATION: 95 % | WEIGHT: 280 LBS | HEART RATE: 80 BPM | SYSTOLIC BLOOD PRESSURE: 181 MMHG | BODY MASS INDEX: 43.95 KG/M2 | TEMPERATURE: 99 F | DIASTOLIC BLOOD PRESSURE: 130 MMHG

## 2023-11-16 DIAGNOSIS — T14.90XA INJURY: ICD-10-CM

## 2023-11-16 DIAGNOSIS — R52 PAIN: ICD-10-CM

## 2023-11-16 DIAGNOSIS — S06.0X1A CONCUSSION WITH LOSS OF CONSCIOUSNESS OF 30 MINUTES OR LESS, INITIAL ENCOUNTER: Primary | ICD-10-CM

## 2023-11-16 PROCEDURE — 25000003 PHARM REV CODE 250: Performed by: FAMILY MEDICINE

## 2023-11-16 PROCEDURE — 99284 EMERGENCY DEPT VISIT MOD MDM: CPT | Mod: 25

## 2023-11-16 RX ORDER — ACETAMINOPHEN 500 MG
1000 TABLET ORAL
Status: COMPLETED | OUTPATIENT
Start: 2023-11-16 | End: 2023-11-16

## 2023-11-16 RX ADMIN — ACETAMINOPHEN 1000 MG: 500 TABLET ORAL at 12:11

## 2023-11-16 NOTE — Clinical Note
"Comarita "Comarita" Garret was seen and treated in our emergency department on 11/16/2023.  She may return to work on 11/20/2023.       If you have any questions or concerns, please don't hesitate to call.      Dr. Munoz/ Charmaine ESPINAL    "

## 2023-11-16 NOTE — ED NOTES
Spoke with Dr. Munoz regarding pt's BP; pt states she has HTN and takes her BP medications daily. Dr. Munoz stated pt can be d/c and told to monitor pressure and follow up with PCP next week. Pt denies HA/ CP; in no acute distress.

## 2023-11-16 NOTE — ED PROVIDER NOTES
Encounter Date: 2023       History     Chief Complaint   Patient presents with    Fall     Reports she was running, slipped, and fell hitting her face on the wall. Complains of pain to right side of her face and right arm. Reports + LOC     43-year-old presents complaining of running into walls said she was chasing a kid and smashed her face into the wall knocked herself out had loss of consciousness has a headache and some dizziness tenderness on right side of the face        Review of patient's allergies indicates:  No Known Allergies  Past Medical History:   Diagnosis Date    Anxiety     Depression     Headache     Hypertension      Past Surgical History:   Procedure Laterality Date     SECTION      CHOLECYSTECTOMY      HYSTERECTOMY       Family History   Problem Relation Age of Onset    Diabetes Father     Hypertension Father     Heart disease Father     Hypertension Mother      Social History     Tobacco Use    Smoking status: Never    Smokeless tobacco: Never   Substance Use Topics    Alcohol use: Not Currently    Drug use: Never     Review of Systems   Constitutional:  Negative for fever.   HENT:  Negative for sore throat.    Respiratory:  Negative for shortness of breath.    Cardiovascular:  Negative for chest pain.   Gastrointestinal:  Negative for nausea.   Genitourinary:  Negative for dysuria.   Musculoskeletal:  Negative for back pain.   Skin:  Negative for rash.   Neurological:  Negative for weakness.   Hematological:  Does not bruise/bleed easily.   All other systems reviewed and are negative.      Physical Exam     Initial Vitals [23 1203]   BP Pulse Resp Temp SpO2   (!) 161/108 85 20 98.6 °F (37 °C) 97 %      MAP       --         Physical Exam    Nursing note and vitals reviewed.  Constitutional: She appears well-developed and well-nourished. She is active.   HENT:   Head: Normocephalic and atraumatic.   Eyes: Conjunctivae, EOM and lids are normal. Pupils are equal, round, and  reactive to light.   Neck: Trachea normal and phonation normal. Neck supple. No thyroid mass present.   Normal range of motion.  Cardiovascular:  Normal rate, regular rhythm, normal heart sounds and normal pulses.           Pulmonary/Chest: Breath sounds normal.   Abdominal: Abdomen is soft. Bowel sounds are normal.   Musculoskeletal:         General: Tenderness present.      Cervical back: Normal range of motion and neck supple.     Neurological: She is alert and oriented to person, place, and time. She has normal strength and normal reflexes.   Skin: Skin is warm, dry and intact.   Psychiatric: She has a normal mood and affect. Her speech is normal and behavior is normal. Judgment and thought content normal. Cognition and memory are normal.         ED Course   Procedures  Labs Reviewed - No data to display       Imaging Results              X-Ray Forearm Right (Final result)  Result time 11/16/23 13:24:26      Final result by Everett Colon MD (11/16/23 13:24:26)                   Impression:      No acute osseous abnormality, fracture, or dislocation.    There is no significant degenerative change.      Electronically signed by: Everett Colon  Date:    11/16/2023  Time:    13:24               Narrative:    EXAMINATION:  XR FOREARM RIGHT    CLINICAL HISTORY:  Injury, unspecified, initial encounter    TECHNIQUE:  Two views of the right forearm.    COMPARISON:  No prior imaging available for comparison    FINDINGS:  There is no acute fracture, subluxation or dislocation.    Joints and interspaces appear maintained.    Osseous structures show normal bone mineral density.    Soft tissues are unremarkable.    There are no radiopaque foreign bodies.                                       X-Ray Cervical Spine AP And Lateral (Final result)  Result time 11/16/23 13:25:44      Final result by Everett Colon MD (11/16/23 13:25:44)                   Impression:      No acute fracture.  No significant degenerative  change.      Electronically signed by: Everett Colon  Date:    11/16/2023  Time:    13:25               Narrative:    EXAMINATION:  XR CERVICAL SPINE AP LATERAL    CLINICAL HISTORY:  Pain, unspecified    TECHNIQUE:  Four views of the cervical spine are noted.    COMPARISON:  01/24/2019    FINDINGS:  C1 is symmetric about C2.  The odontoid is intact.  No displaced fracture.  The prevertebral soft tissues are unremarkable.  Straightening of the normal lordotic curvature.                                       CT Maxillofacial Without Contrast (Final result)  Result time 11/16/23 13:20:59      Final result by Inocencio Adkins MD (11/16/23 13:20:59)                   Narrative:    EXAMINATION  CT MAXILLOFACIAL WITHOUT CONTRAST    CLINICAL HISTORY  Maxillofacial pain;Trauma to the right side of face;    TECHNIQUE  Non-contrast helical-acquisition CT images were obtained and multiplanar reconstructions accomplished by a CT technologist at a separate workstation, pushed to PACS for physician review.    COMPARISON  1 October 2018    FINDINGS  Images were reviewed in soft tissue and bone windows.    Exam quality: adequate for evaluation    Bones: No acutely displaced fracture is appreciated.  There is normal and symmetric TMJ alignment.    Orbits: Unremarkable appearance of the intraorbital fat and musculature. Normal, symmetric size and contour of the globes.    Aerodigestive Structures: There are similar postoperative changes of the bilateral medial maxillary sinus walls.  Diffuse mucoperiosteal thickening of the maxillary cavities also appreciated (left > right).  There is similar appearance of mucosal cyst versus polyp at the inferior aspect of the right maxillary sinus.  No evidence of sinus cavity fluid level is identified.  Unremarkable appearance of the naso-/oropharynx.    Other findings: No evidence of acute or focal intracranial abnormality or cervical spine displacement.  Bilateral mastoids are well aerated.  Regional vasculature is unremarkable within limitations of non-contrast assessment.  Visualized subcutaneous tissues are without acute or focal abnormality.    IMPRESSION  1. No acutely displaced maxillofacial fracture is appreciated.  2. Similar chronic findings of the maxillary sinuses, with no evidence to suggest acute sinusitis.    RADIATION DOSE  Automated tube current modulation, weight-based exposure dosing, and/or iterative reconstruction technique utilized to reach lowest reasonably achievable exposure rate.    DLP: 586.9 mGy*cm      Electronically signed by: Inocencio Adkins  Date:    11/16/2023  Time:    13:20                                     CT Head Without Contrast (Final result)  Result time 11/16/23 13:02:03      Final result by Sam Scott MD (11/16/23 13:02:03)                   Impression:      No acute intracranial findings.      Electronically signed by: Sam Scott  Date:    11/16/2023  Time:    13:02               Narrative:    EXAMINATION:  CT HEAD WITHOUT CONTRAST    CLINICAL HISTORY:  Trauma with loss of consciousness;    TECHNIQUE:  CT imaging of the head performed from the skull base to the vertex without intravenous contrast. DLP 1105 mGycm. Automatic exposure control, adjustment of mA/kV or iterative reconstruction technique was used to reduce radiation.    COMPARISON:  8 June 2021    FINDINGS:  There is no acute cortical infarct, hemorrhage or mass lesion.  Similar cerebellar tonsillar ectopia.  Ventricles are not enlarged.    Face discussed in a separate report.                                       Medications   acetaminophen tablet 1,000 mg (1,000 mg Oral Given 11/16/23 1242)     Medical Decision Making  Chief Complaint  Patient presents with  · Fall    Reports she was running, slipped, and fell hitting her face on the wall. Complains of pain to right side of her face and right arm. Reports + LOC  Physical remarkable assessment tenderness over the face all x-rays were negative  discussed findings and plan with patient she is in agreement    Amount and/or Complexity of Data Reviewed  Radiology: ordered and independent interpretation performed.    Risk  OTC drugs.  Risk Details: Differential diagnosis fracture versus contusion versus concussion                                   Clinical Impression:  Final diagnoses:  [R52] Pain  [T14.90XA] Injury  [S06.0X1A] Concussion with loss of consciousness of 30 minutes or less, initial encounter (Primary)          ED Disposition Condition    Discharge Stable          ED Prescriptions    None       Follow-up Information       Follow up With Specialties Details Why Contact Info    Griselda Carroll MD Internal Medicine In 3 days  [address] [facility] [address]  [phone]               Chase Munoz MD  11/16/23 [id number]

## 2023-11-27 RX ORDER — HYDROCHLOROTHIAZIDE 25 MG/1
25 TABLET ORAL DAILY
Qty: 30 TABLET | Refills: 2 | Status: SHIPPED | OUTPATIENT
Start: 2023-11-27 | End: 2024-02-27

## 2023-11-27 NOTE — TELEPHONE ENCOUNTER
Pt called to report she was in the hospital with a concussion. Pt requesting a call back at [phone]      Pt also requesting a referral to physical therapy for (R) Knee and (R) foot. Pt requesting [facility] for PT

## 2023-12-05 ENCOUNTER — TELEPHONE (OUTPATIENT)
Dept: INTERNAL MEDICINE | Facility: CLINIC | Age: 43
End: 2023-12-05

## 2024-01-10 DIAGNOSIS — G43.009 MIGRAINE WITHOUT AURA AND RESPONSIVE TO TREATMENT: ICD-10-CM

## 2024-01-10 NOTE — TELEPHONE ENCOUNTER
----- Message from Natalee Brand sent at 1/10/2024  2:12 PM CST -----  Pt called stating she is needing a refill on this medication sumatriptan (IMITREX) 100 MG tablet     [facility] in [place]       [phone]

## 2024-01-11 RX ORDER — SUMATRIPTAN SUCCINATE 100 MG/1
100 TABLET ORAL EVERY 12 HOURS PRN
Qty: 12 TABLET | Refills: 2 | Status: SHIPPED | OUTPATIENT
Start: 2024-01-11 | End: 2024-07-09

## 2024-01-23 ENCOUNTER — OFFICE VISIT (OUTPATIENT)
Dept: GASTROENTEROLOGY | Facility: CLINIC | Age: 44
End: 2024-01-23
Payer: MEDICAID

## 2024-01-23 VITALS
HEART RATE: 88 BPM | BODY MASS INDEX: 43.68 KG/M2 | OXYGEN SATURATION: 99 % | TEMPERATURE: 98 F | HEIGHT: 68 IN | WEIGHT: 288.19 LBS | SYSTOLIC BLOOD PRESSURE: 136 MMHG | DIASTOLIC BLOOD PRESSURE: 86 MMHG

## 2024-01-23 DIAGNOSIS — K86.1 CHRONIC PANCREATITIS, UNSPECIFIED PANCREATITIS TYPE: Primary | ICD-10-CM

## 2024-01-23 DIAGNOSIS — K59.09 CHRONIC CONSTIPATION: ICD-10-CM

## 2024-01-23 DIAGNOSIS — Z12.11 SCREENING FOR COLON CANCER: ICD-10-CM

## 2024-01-23 DIAGNOSIS — K21.9 GASTROESOPHAGEAL REFLUX DISEASE, UNSPECIFIED WHETHER ESOPHAGITIS PRESENT: ICD-10-CM

## 2024-01-23 PROCEDURE — 3075F SYST BP GE 130 - 139MM HG: CPT | Mod: CPTII,,, | Performed by: NURSE PRACTITIONER

## 2024-01-23 PROCEDURE — 3079F DIAST BP 80-89 MM HG: CPT | Mod: CPTII,,, | Performed by: NURSE PRACTITIONER

## 2024-01-23 PROCEDURE — 3008F BODY MASS INDEX DOCD: CPT | Mod: CPTII,,, | Performed by: NURSE PRACTITIONER

## 2024-01-23 PROCEDURE — 99214 OFFICE O/P EST MOD 30 MIN: CPT | Mod: S$PBB,,, | Performed by: NURSE PRACTITIONER

## 2024-01-23 PROCEDURE — 1159F MED LIST DOCD IN RCRD: CPT | Mod: CPTII,,, | Performed by: NURSE PRACTITIONER

## 2024-01-23 PROCEDURE — 1160F RVW MEDS BY RX/DR IN RCRD: CPT | Mod: CPTII,,, | Performed by: NURSE PRACTITIONER

## 2024-01-23 PROCEDURE — 99214 OFFICE O/P EST MOD 30 MIN: CPT | Mod: PBBFAC | Performed by: NURSE PRACTITIONER

## 2024-01-23 RX ORDER — POLYETHYLENE GLYCOL 3350, SODIUM SULFATE, SODIUM CHLORIDE, POTASSIUM CHLORIDE, SODIUM ASCORBATE, AND ASCORBIC ACID 7.5-2.691G
2000 KIT ORAL ONCE
Qty: 1 KIT | Refills: 0 | Status: SHIPPED | OUTPATIENT
Start: 2024-01-23 | End: 2024-01-23

## 2024-01-23 RX ORDER — PANTOPRAZOLE SODIUM 40 MG/1
40 TABLET, DELAYED RELEASE ORAL DAILY
Qty: 30 TABLET | Refills: 11 | Status: SHIPPED | OUTPATIENT
Start: 2024-01-23 | End: 2024-02-27 | Stop reason: SDUPTHER

## 2024-01-23 RX ORDER — NIFEDIPINE 90 MG/1
90 TABLET, EXTENDED RELEASE ORAL DAILY
Qty: 90 TABLET | Refills: 1 | Status: SHIPPED | OUTPATIENT
Start: 2024-01-23 | End: 2024-02-27

## 2024-01-23 RX ORDER — NALTREXONE HYDROCHLORIDE 50 MG/1
25 TABLET, FILM COATED ORAL DAILY
Qty: 30 TABLET | Refills: 1 | Status: SHIPPED | OUTPATIENT
Start: 2024-01-23 | End: 2024-02-27

## 2024-01-23 RX ORDER — NALTREXONE HYDROCHLORIDE 50 MG/1
50 TABLET, FILM COATED ORAL DAILY
COMMUNITY
End: 2024-01-23 | Stop reason: SDUPTHER

## 2024-01-23 NOTE — PROGRESS NOTES
Subjective:       Patient ID: Abdi Combs is a 43 y.o. female.    Chief Complaint: Idiopathic Pancreatitis (Patient states on Sunday while she was sleeping she felt her food coming back up. She states she ended up having to vomit, she admits she has been out of her Pantoprazole. She has not had any problems with GERD ever since being on the Pantoprazole. )    This 43-year-old  female with anxiety, depression, hypertension, cholecystectomy, and pancreatitis is referred for pancreatitis and GERD.  She presents unaccompanied.  She reports being out of pantoprazole for the past week with frequent regurgitation of acid and occasional subsequent vomiting of a foamy substance that awakens her in the middle of the night.  She will notice symptoms throughout the day depending on what she eats as well.  She is requesting a refill of pantoprazole.  Her appetite is good and her weight is stable.  She denies fever, chills, hematemesis, odynophagia, dysphagia, early satiety, or abdominal pain.  She reports a longstanding history of chronic constipation and having 1-2 bowel movements per week of small amount.  She will sit on the toilet for long periods of time in order to have a bowel movement.  She has tried Dulcolax, MiraLax, and stool softeners in the past with minimal relief noted.  She is interested in starting Linzess.  She denies melena, hematochezia, fecal urgency, fecal incontinence, or pain with defecation.  She does report good relief with dilation after last EGD procedure April 2019 and has had no further issues of dysphagia since that time.    CT chest, abdomen and pelvis with contrast November 8, 2022 revealed interval resolution of the inflammatory changes in the upper mid mesentery and peripancreatic region when compared to the prior examination.  No acute process seen.      CT abdomen with contrast July 28, 2022 revealed mild stranding involving the superior mesentery and near the  pancreatic head/uncinate.  This may relate to pancreatitis or nonspecific mild mesenteric congestion.  No organized fluid collection or evidence of pancreatic necrosis.     CT scan abdomen and pelvis without contrast June 28, 2022 revealed thickening of the small bowel in the mid and upper abdomen with subtle mesenteric vascular congestion.  An element of enteritis is a concern.  There is mild peripancreatic fat stranding at the level of the uncinate process of the pancreas (series 3, image 49).  An element of pancreatitis is a concern.  Details and other findings as discussed above.  No significant discrepancy with overnight report.     CT scan abdomen and pelvis with contrast May 24, 2022 revealed fatty induration or inflammatory change fall thickening involving the distal stomach and proximal duodenum.  The fatty induration is in the region of the head of the pancreas but the pancreas enhances normally.  Suspect acute inflammatory process arises from the stomach and duodenum.  There is no evidence of pancreatic necrosis.    MRI MRCP without contrast May 23, 2022 revealed acute pancreatitis.  No evidence of choledocholithiasis.  1 cm right renal cyst.  Moderate hepatic steatosis.  Cholecystectomy.    Abdominal ultrasound May 22, 2022 was unremarkable.    CT scan abdomen and pelvis without contrast May 22, 2022 revealed findings consistent with acute interstitial edematous pancreatitis, without associated ductal dilatation, focal lesion, or peripancreatic collection.  No other evidence of acute or suspicious intra-abdominal process.  No significant discrepancy identified in relation to the teleradiology preliminary report.  Additional details provided above.     She underwent EGD April 15, 2019 secondary to GERD, dysphagia, and abnormal UGI with findings of GE junction at 39 cm, GE junction intubated at an angle with a shelf-like appearance to the esophagus just proximal to it, lumen appeared narrowed without any  obvious stricture, balloon dilation was performed with CRE balloon with 15-16.5-18 mm, normal stomach, normal duodenum.    She denies ever having a colonoscopy done.  She denies regular NSAID use or use of blood thinners.  She denies tobacco or alcohol use.  She denies illicit drug use.  She denies a family history of IBD, colon polyps, or colon cancer.    Review of patient's allergies indicates:  No Known Allergies    Past Medical History:   Diagnosis Date    Anxiety     Depression     Headache     Hypertension      Past Surgical History:   Procedure Laterality Date     SECTION      CHOLECYSTECTOMY      HYSTERECTOMY       Family History:   family history includes Diabetes in her father; Heart disease in her father; Hypertension in her father and mother.    Social History:    reports that she has never smoked. She has never used smokeless tobacco. She reports that she does not currently use alcohol. She reports that she does not use drugs.    Review of Systems  Negative except as noted in the HPI.      Objective:      Physical Exam  Constitutional:       Appearance: Normal appearance.   HENT:      Head: Normocephalic.      Mouth/Throat:      Mouth: Mucous membranes are moist.   Eyes:      Extraocular Movements: Extraocular movements intact.      Conjunctiva/sclera: Conjunctivae normal.      Pupils: Pupils are equal, round, and reactive to light.   Cardiovascular:      Rate and Rhythm: Normal rate and regular rhythm.      Pulses: Normal pulses.      Heart sounds: Normal heart sounds.   Pulmonary:      Effort: Pulmonary effort is normal.      Breath sounds: Normal breath sounds.   Abdominal:      General: Bowel sounds are normal.      Palpations: Abdomen is soft.   Musculoskeletal:         General: Normal range of motion.      Cervical back: Normal range of motion and neck supple.   Skin:     General: Skin is warm and dry.   Neurological:      General: No focal deficit present.      Mental Status: She is alert  and oriented to person, place, and time.   Psychiatric:         Mood and Affect: Mood normal.         Behavior: Behavior normal.         Thought Content: Thought content normal.         Judgment: Judgment normal.         Home Medications:     Current Outpatient Medications   Medication Sig    amitriptyline (ELAVIL) 10 MG tablet Take 1 tablet (10 mg total) by mouth every evening.    buPROPion (WELLBUTRIN SR) 150 MG TBSR 12 hr tablet Take 1 tablet (150 mg total) by mouth 2 (two) times daily.    cholecalciferol, vitamin D3, (VITAMIN D3) 25 mcg (1,000 unit) capsule Take 1 capsule (1,000 Units total) by mouth once daily.    clonazePAM (KLONOPIN) 2 MG Tab Take 2 mg by mouth once daily.    ferrous sulfate 324 mg (65 mg iron) TbEC Take 1 tablet (324 mg total) by mouth every other day.    fluticasone propionate (FLONASE) 50 mcg/actuation nasal spray 1 spray by Nasal route daily as needed.    gabapentin (NEURONTIN) 300 MG capsule Take 1 capsule (300 mg total) by mouth every evening.    hydroCHLOROthiazide (HYDRODIURIL) 25 MG tablet Take 1 tablet (25 mg total) by mouth once daily.    ketorolac (TORADOL) 10 mg tablet Take 1 tablet (10 mg total) by mouth 2 (two) times a day.    NIFEdipine (PROCARDIA-XL) 90 MG (OSM) 24 hr tablet Take 1 tablet (90 mg total) by mouth once daily.    pantoprazole (PROTONIX) 40 MG tablet Take 1 tablet (40 mg total) by mouth once daily.    sumatriptan (IMITREX) 100 MG tablet Take 1 tablet (100 mg total) by mouth every 12 (twelve) hours as needed for Migraine.    venlafaxine (EFFEXOR-XR) 150 MG Cp24 Take 150 mg by mouth 2 (two) times daily.     Laboratory Results:     Recent Results (from the past 44127 hour(s))   Chlamydia/GC, PCR    Collection Time: 12/06/22  2:00 PM    Specimen: Urine   Result Value Ref Range    Chlamydia trachomatis PCR Not Detected Not Detected    N. gonorrhea PCR Not Detected Not Detected   Wet Prep, Genital    Collection Time: 12/06/22  2:00 PM    Specimen: Cervicovaginal;  Genital   Result Value Ref Range    WBC, Wet Prep Few (A) None Seen    Clue Cells, Wet Prep None Seen None Seen    Trichomonas, Wet Prep None Seen None Seen    Yeast, Wet Prep Many (A) None Seen   Hepatitis B Surface Antigen    Collection Time: 12/06/22  2:36 PM   Result Value Ref Range    Hepatitis B Surface Antigen Nonreactive Nonreactive   Hepatitis C Antibody    Collection Time: 12/06/22  2:36 PM   Result Value Ref Range    Hep C Ab Interp Nonreactive Nonreactive   HIV 1/2 Ag/Ab (4th Gen)    Collection Time: 12/06/22  2:36 PM   Result Value Ref Range    HIV Nonreactive Nonreactive   SYPHILIS ANTIBODY (WITH REFLEX RPR)    Collection Time: 12/06/22  2:36 PM   Result Value Ref Range    Syphilis Antibody Nonreactive Nonreactive, Equivocal   TSH    Collection Time: 02/02/23  8:44 AM   Result Value Ref Range    TSH 1.453 0.350 - 4.940 uIU/mL   T4, Free    Collection Time: 02/02/23  8:44 AM   Result Value Ref Range    Thyroxine Free 1.06 0.70 - 1.48 ng/dL   Comprehensive Metabolic Panel    Collection Time: 02/02/23  8:44 AM   Result Value Ref Range    Sodium Level 139 136 - 145 mmol/L    Potassium Level 3.5 3.5 - 5.1 mmol/L    Chloride 102 98 - 107 mmol/L    Carbon Dioxide 28 22 - 29 mmol/L    Glucose Level 100 74 - 100 mg/dL    Blood Urea Nitrogen 8.5 7.0 - 18.7 mg/dL    Creatinine 0.71 0.55 - 1.02 mg/dL    Calcium Level Total 9.5 8.4 - 10.2 mg/dL    Protein Total 7.5 6.4 - 8.3 gm/dL    Albumin Level 3.4 (L) 3.5 - 5.0 g/dL    Globulin 4.1 (H) 2.4 - 3.5 gm/dL    Albumin/Globulin Ratio 0.8 (L) 1.1 - 2.0 ratio    Bilirubin Total 0.3 <=1.5 mg/dL    Alkaline Phosphatase 76 40 - 150 unit/L    Alanine Aminotransferase 14 0 - 55 unit/L    Aspartate Aminotransferase 14 5 - 34 unit/L    eGFR >60 mls/min/1.73/m2   Magnesium    Collection Time: 02/02/23  8:44 AM   Result Value Ref Range    Magnesium Level 1.90 1.60 - 2.60 mg/dL   Phosphorus    Collection Time: 02/02/23  8:44 AM   Result Value Ref Range    Phosphorus Level 4.4 2.3  - 4.7 mg/dL   Vitamin D    Collection Time: 02/02/23  8:44 AM   Result Value Ref Range    Vit D 25 OH 26.1 (L) 30.0 - 80.0 ng/mL   Iron and TIBC    Collection Time: 02/02/23  8:44 AM   Result Value Ref Range    Iron Binding Capacity Unsaturated 200 70 - 310 ug/dL    Iron Level 41 (L) 50 - 170 ug/dL    Transferrin 218 180 - 382 mg/dL    Iron Binding Capacity Total 241 (L) 250 - 450 ug/dL    Iron Saturation 17 (L) 20 - 50 %   Ferritin    Collection Time: 02/02/23  8:44 AM   Result Value Ref Range    Ferritin Level 48.50 4.63 - 204.00 ng/mL   CBC with Differential    Collection Time: 02/02/23  8:44 AM   Result Value Ref Range    WBC 8.2 4.5 - 11.5 x10(3)/mcL    RBC 5.42 (H) 4.20 - 5.40 x10(6)/mcL    Hgb 13.3 12.0 - 16.0 gm/dL    Hct 41.0 37.0 - 47.0 %    MCV 75.6 (L) 80.0 - 94.0 fL    MCH 24.5 pg    MCHC 32.4 (L) 33.0 - 36.0 mg/dL    RDW 15.5 11.5 - 17.0 %    Platelet 244 130 - 400 x10(3)/mcL    MPV 10.4 7.4 - 10.4 fL    Neut % 58.7 %    Lymph % 29.2 %    Mono % 10.4 %    Eos % 1.2 %    Basophil % 0.4 %    Lymph # 2.39 0.6 - 4.6 x10(3)/mcL    Neut # 4.80 2.1 - 9.2 x10(3)/mcL    Mono # 0.85 0.1 - 1.3 x10(3)/mcL    Eos # 0.10 0 - 0.9 x10(3)/mcL    Baso # 0.03 0 - 0.2 x10(3)/mcL    IG# 0.01 0 - 0.04 x10(3)/mcL    IG% 0.1 %     Imaging Results:     Narrative & Impression  EXAMINATION:  CT CHEST ABDOMEN PELVIS WITH CONTRAST (XPD)     CLINICAL HISTORY:  p anca mesenteric congestions; Antineutrophil cytoplasmic antibody (ANCA) vasculitis     TECHNIQUE:  Low dose axial images, sagittal and coronal reformations were obtained from the thoracic inlet to the pubic symphysis following the IV and oral contrast administration.  Automatic exposure control is utilized to reduce patient radiation exposure.     COMPARISON:  07/28/2022     FINDINGS:  The lungs are adequately aerated.  No mass is seen.  No nodule is seen.  No pleural thickening is seen.  No pleural effusion is seen.  No infiltrate is seen.     The thoracic aorta is normal  in caliber..     No mediastinal adenopathy is seen.     The heart appears normal in size..     The liver appears normal.  No liver mass or lesion is seen.  Portal and hepatic veins appear normal..     The patient is status post cholecystectomy.     The spleen appears normal.  No splenic mass or lesion is seen.     The pancreas appears grossly unremarkable.  No pancreatic mass or lesion is seen.  No inflammation is seen.  The inflammatory changes seen in the mesentery and adjacent to the uncinate process of the pancreas on the prior examination are no longer seen.  No pseudocyst is seen.     No adrenal abnormality is seen.  No adrenal nodule is seen.     The kidneys are well perfused.  No hydronephrosis is seen.  No hydroureter is seen.  No retroperitoneal abnormality is seen..     Visualized portions of the bowel shows no acute abnormality.  No colitis is seen.  No diverticulitis is seen.  No colonic mass is seen.  The appendix appears normal.     No free air is seen.  No free fluid is seen.     Urinary bladder appears unremarkable.     No acute bony abnormality is seen.     Impression:     Interval resolution of the inflammatory changes in the is upper mid mesentery and peripancreatic region when compared to the prior examination     No acute process seen      Electronically signed by: Mick Faith  Date:                                            11/08/2022  Time:                                           11:25      Narrative & Impression  EXAMINATION:  CT ABDOMEN WITH CONTRAST     CLINICAL HISTORY:  Pancreatitis suspected;Idiopathic acute pancreatitis without necrosis or infection     TECHNIQUE:  CT imaging of the abdomen after intravenous contrast. Axial, coronal and sagittal reformatted images reviewed. Dose length product is 1063 mGycm. Automatic exposure control, adjustment of mA/kV or iterative reconstruction technique was used to limit radiation dose.     COMPARISON:  CT 06/28/2022      FINDINGS:  Liver: Normal.     Gallbladder and biliary tree: Prior cholecystectomy.  No intra or extrahepatic biliary ductal dilation.     Pancreas: Homogeneous enhancement of the pancreas.  No pancreatic ductal dilatation.  Very mild stranding near the pancreatic head/uncinate.     Spleen: Normal.     Adrenals: Normal.     Kidneys and visualized ureters: Symmetric renal enhancement and excretion.  No hydronephrosis.  10 mm right renal cyst.  No imaging follow-up is needed for this cyst by consensus recommendations.     Stomach and visualized bowel: Included segments of small bowel and colon are normal in caliber.     Lymph nodes: No pathologically enlarged lymph node identified.     Peritoneum: Mild stranding in the upper mesentery.  No drainable fluid collection or significant ascites.     Vasculature: Normal abdominal aortic caliber.  Patent SMV, splenic vein and main portal vein.     Abdominal wall: Normal.     Lung bases: No consolidation or pleural effusion.     Bones: No acute osseous findings.     Impression:     Mild stranding involving the superior mesentery and near the pancreatic head/uncinate.  This may relate to pancreatitis or nonspecific mild mesenteric congestion.     No organized fluid collection or evidence of pancreatic necrosis.      Electronically signed by: Dariel Harley  Date:                                            07/28/2022  Time:                                           09:20      Narrative & Impression     Technique:CT of the abdomen and pelvis was performed with axial images as well as sagittal and coronal reconstruction images without intravenous contrast.     Comparison:None available.     Clinical History:Abd pain x1 hr with N/V. Hysterectomy.     Dosage Information:Automated Exposure Control was utilized.  DLP 1283     Findings:     Thorax:     Lungs:There is mild nonspecific dependent change at the lung bases.     Pleura:No effusions or thickening.     Abdomen:     Abdominal  Wall:No abdominal wall pathology is seen.     Liver:Mild fatty infiltration of the liver is present.     Within limitations of noncontrast technique, no acute findings of the liver and the spleen identified     Biliary System:No extrahepatic biliary duct dilatation is seen.     Gallbladder:Surgical clips are seen in the gallbladder fossa which may reflect prior cholecystectomy.     Pancreas:There is mild peripancreatic fat stranding at the level of the uncinate process of the pancreas (series 3, image 49). An element of pancreatitis is a concern..     Adrenals:The adrenal glands appear unremarkable.     Kidneys:The kidneys appear unremarkable with no stones or hydronephrosis.     Bowel:     Esophagus:The visualized esophagus appears unremarkable.     Stomach:The stomach appears unremarkable.     Duodenum:Unremarkable appearing duodenum.     Small Bowel:There is thickening of the small bowel in the mid and upper abdomen with subtle mesenteric vascular congestion. An element of enteritis is a concern.     Colon:Nondistended.     Appendix:The appendix appears unremarkable.     Peritoneum:No intraperitoneal free air or ascites is seen.     Pelvis:     Bladder:The bladder is nondistended and cannot be definitively evaluated.     Female:     Uterus:The uterus is not identified consistent with history of hysterectomy.     Ovaries:No adnexal masses are seen.     Bony structures:     Dorsal Spine:There is mild spondylosis of the visualized dorsal spine.     Impression:    1. There is thickening of the small bowel in the mid and upper abdomen with subtle mesenteric vascular congestion. An element of enteritis is a concern.     2. There is mild peripancreatic fat stranding at the level of the uncinate process of the pancreas (series 3, image 49). An element of pancreatitis is a concern.     3. Details and other findings as discussed above.     No significant discrepancy with overnight report.      Electronically signed by:  Herbert Villalobos  Date:                                            06/28/2022  Time:                                           08:21      Narrative    Indication:  Abdominal pain initial encounter, May 24, 2022 at 1:40 PM    Technique:  Multiple axial CT images were obtained through the abdomen and pelvis from the base of the lungs to the perineum.  100 cc of Visipaque 320 was given. Sagittal and coronal reconstructions were performed.  Comparison is made with prior study dated May 22, 2020    Findings:  The lung bases are clear and well aerated. The heart size is within normal limits. There is no pericardial effusion. The distal esophagus is unremarkable. The gallbladder is surgically absent. The liver, spleen, adrenal glands, and kidneys show no significant abnormality. There is fatty induration or inflammatory change in the region of the head of the pancreas but the pancreas enhances homogeneously. There is wall thickening and surrounding fatty induration and inflammatory change involving the distal stomach as well as proximal duodenum. There is no obstructive uropathy.  The aorta is not dilated.  There is no retroperitoneal mass or lymphadenopathy. The distal duodenum as well as jejunum and ileum and colon show no significant abnormality. The appendix is not dilated. There is no surrounding fatty induration or inflammatory change.    Continuing into the pelvis, the rectosigmoid colon and urinary bladder show no significant abnormality. There is trace ascites. The uterus and ovaries are surgically absent. There is lumbar and sacral spondylosis.    Impression: Fatty induration or inflammatory change fall thickening involving the distal stomach and proximal duodenum. The fatty induration is in the region of the head of the pancreas but the pancreas enhances normally. Suspect acute inflammatory process arises from the stomach and duodenum. There is no evidence of pancreatic necrosis.    All CT scans at this facility use  dose modulation, iterative reconstruction, and/or weight base dose when appropriate to reduce radiation dose to as low as reasonably achievable.    Number of CT and cardiac nuclear medicine studies in the past 12 months: 1  Exam End: 05/24/22 13:40 Last Resulted: 05/24/22 15:50   Received From: [facility] and Its Subsidiaries and Affiliates  Result Received: 08/22/23 08:28        MRI MRCP Without Contrast  Order: [id number]  Impression    Acute pancreatitis. No evidence of choledocholithiasis.  1 cm right renal cyst.  Moderate hepatic steatosis. Cholecystectomy.  Narrative    HISTORY:  Prior cholelithiasis, status post cholecystectomy. Evaluate for choledocholithiasis. Acute pancreatitis.    TECHNIQUE: MR images of the abdomen utilizing axial and coronal T2 HASTE, axial in and out of phaseT1 GRE , diffusion and ADC map, axial FS T2, axial and coronal GRE T1 fat suppressed VIBE images without contrast.  Heavily T2-weighted imaging with MIP reconstruction is performed for optimal biliary tree and pancreatic duct assessment.    COMPARISON:  CT May 22, 2022        FINDINGS:    Hepatic steatosis is moderate, with 25 percent hepatic parenchymal signal loss on the axial T1 GRE opposed sequence. No liver lesion is seen. The gallbladder is surgically absent. No intra or extrahepatic bile duct dilation is observed. There is no evidence of choledocholithiasis.    The pancreas is normal T1 fat-suppressed pre-contrast signal intensity and shows no evidence of masses or pancreatic duct dilatation.  Peripancreatic edema is present, consistent with acute pancreatitis.    The spleen size is normal.      The adrenal glands are unremarkable.      The kidneys show no evidence of hydronephrosis or masses.  A 1 cm right renal cyst is present.    There is no evidence of lymphadenopathy.      No upper abdominal fluid collections are seen.         Exam End: 05/23/22 09:29 Last Resulted: 05/23/22 10:03    Received From: [facility] and Its Subsidiaries and Affiliates         Abdomen Limited  Order: [id number]  Impression    Limited exam due to interfering bowel gas. Status post cholecystectomy. No ductal dilatation.  Narrative    HISTORY/TECHNIQUE: Elevated liver function tests. Real-time scanning of the abdomen is completed. No prior scans are available.    FINDINGS: Limited visualization of the liver. Visualized portions of the liver are unremarkable. Antegrade flow within the portal vein. Status post cholecystectomy. Common duct 4.1 mm. Pancreas, inferior vena cava and aorta are obscured by gas. Right kidney measures 12.8 cm in length and is unremarkable. No ascites.  Exam End: 05/22/22 13:15 Last Resulted: 05/22/22 14:13   Received From: [facility] and Its Subsidiaries and Affiliates        Narrative & Impression  EXAMINATION:  CT ABDOMEN PELVIS WITHOUT CONTRAST     CLINICAL HISTORY:  Bowel obstruction suspected;Abdominal pain, acute, nonlocalized;     TECHNIQUE:  Helical-acquisition CT images were obtained without the intravenous administration of iodinated contrast media. Enteric contrast was not utilized.  Multiplanar reformats were accomplished by a CT technologist at a separate workstation and pushed to PACS for physician review.     Automated tube current modulation, weight-based exposure dosing, and/or iterative reconstruction technique utilized to reach lowest reasonably achievable exposure rate.     DLP: 1178.2 mGy*cm     COMPARISON:  None available at the time of initial interpretation.     FINDINGS:  Images were reviewed in soft tissue, lung, and bone windows.     Exam quality: adequate for evaluation     Lines/tubes: none visualized     Heart: No acute or focal abnormality.     Lung bases: No acute or focal abnormality.     Esophagus: Visualized lower portion is unremarkable.     Hepatobiliary: No acute or focal liver  abnormality. The gallbladder is surgically absent.  No convincing obstructive biliary process.     Pancreas: Edematous pancreas with surrounding peripancreatic fat stranding and fluid in the right anterior pararenal spaces. This is consistent with acute pancreatitis. No organized acute peripancreatic collection or pseudocyst is identified.  There is no dilatation of the pancreatic duct.     Spleen: No acute or focal abnormality.     Adrenal Glands: No acute or focal abnormality.     Genitourinary: No solid lesion renal lesion, complex cyst, radiopaque stone, or evidence of distal obstructive uropathy. No focal bladder wall thickening or convincing intraluminal abnormality. Prostate is unremarkable for CT assessment.     Stomach/Bowel: No evidence of gastric outlet or small bowel obstruction. No acute inflammatory process or convincing focal lesion.     Peritoneal/Extraperitoneal Spaces: No free fluid or air. No drainable collections.     Vasculature: No acute or focal abnormality.     Lymph Nodes: No pathologic enlargement or necrotic process.     Musculoskeletal: No acute process or suspicious focal abnormality.     Impression:      1. Findings consistent with acute interstitial edematous pancreatitis, without associated ductal dilatation, focal lesion, or peripancreatic collection.  2. No other evidence of acute or suspicious intra-abdominal process.  No significant discrepancy identified in relation to the teleradiology preliminary report. Additional details provided above.     Electronically signed by resident: Inocencio Adkins  Date:                                            05/22/2022  Time:                                           06:33     Assessment/Plan:     Problem List Items Addressed This Visit          GI    Chronic pancreatitis - Primary     CT chest, abdomen and pelvis with contrast November 8, 2022 revealed interval resolution of the inflammatory changes in the upper mid mesentery and peripancreatic  region when compared to the prior examination.  No acute process seen.   CT abdomen with contrast July 28, 2022 revealed mild stranding involving the superior mesentery and near the pancreatic head/uncinate.  This may relate to pancreatitis or nonspecific mild mesenteric congestion.  No organized fluid collection or evidence of pancreatic necrosis.   CT scan abdomen and pelvis without contrast June 28, 2022 revealed thickening of the small bowel in the mid and upper abdomen with subtle mesenteric vascular congestion.  An element of enteritis is a concern.  There is mild peripancreatic fat stranding at the level of the uncinate process of the pancreas (series 3, image 49).  An element of pancreatitis is a concern.  Details and other findings as discussed above.  No significant discrepancy with overnight report.   CT scan abdomen and pelvis with contrast May 24, 2022 revealed fatty induration or inflammatory change fall thickening involving the distal stomach and proximal duodenum.  The fatty induration is in the region of the head of the pancreas but the pancreas enhances normally.  Suspect acute inflammatory process arises from the stomach and duodenum.  There is no evidence of pancreatic necrosis.  MRI MRCP without contrast May 23, 2022 revealed acute pancreatitis.  No evidence of choledocholithiasis.  1 cm right renal cyst.  Moderate hepatic steatosis.  Cholecystectomy.  Abdominal ultrasound May 22, 2022 was unremarkable.  CT scan abdomen and pelvis without contrast May 22, 2022 revealed findings consistent with acute interstitial edematous pancreatitis, without associated ductal dilatation, focal lesion, or peripancreatic collection.  No other evidence of acute or suspicious intra-abdominal process.  No significant discrepancy identified in relation to the teleradiology preliminary report.  Additional details provided above.  Denies problems with pancreatitis since 2022  Will refer for EUS to Dr. Bowen  Call  with updates  ER precautions provided  F/u clinic visit with NP in 6 months (after date of scheduled procedure)         Relevant Orders    Ambulatory referral/consult to Gastroenterology    Gastroesophageal reflux disease     See above  She underwent EGD April 15, 2019 secondary to GERD, dysphagia, and abnormal UGI with findings of GE junction at 39 cm, GE junction intubated at an angle with a shelf-like appearance to the esophagus just proximal to it, lumen appeared narrowed without any obvious stricture, balloon dilation was performed with CRE balloon with 15-16.5-18 mm, normal stomach, normal duodenum.  GERD lifestyle modifications  Reflux precautions  Avoid NSAID use  Will refill pantoprazole 40 mg daily  Call with updates         Relevant Medications    pantoprazole (PROTONIX) 40 MG tablet    Chronic constipation     See above  Recommend soluble fiber supplementation  Avoid straining or sitting on the toilet for long periods of time  Linzess 145 mc daily  Call with updates         Relevant Medications    linaCLOtide (LINZESS) 145 mcg Cap capsule    Screening for colon cancer     Colonoscopy         Relevant Orders    Case Request Endoscopy: COLONOSCOPY (Completed)

## 2024-01-23 NOTE — ASSESSMENT & PLAN NOTE
See above  She underwent EGD April 15, 2019 secondary to GERD, dysphagia, and abnormal UGI with findings of GE junction at 39 cm, GE junction intubated at an angle with a shelf-like appearance to the esophagus just proximal to it, lumen appeared narrowed without any obvious stricture, balloon dilation was performed with CRE balloon with 15-16.5-18 mm, normal stomach, normal duodenum.  GERD lifestyle modifications  Reflux precautions  Avoid NSAID use  Will refill pantoprazole 40 mg daily  Call with updates

## 2024-01-23 NOTE — TELEPHONE ENCOUNTER
----- Message from Colleen Kilgore sent at 1/23/2024  9:10 AM CST -----  Regarding: Glen-- Medication Refills     Refill Request     Provider: Dr. Carroll      Last Visit: 11/09/2023     Next Visit: 02/27/2024     Patient's Contact #:  [phone]     Medication Name & Dose: Procardia -XL 90 mg                                             Naltrexone  50 mg      Preferred Pharmacy: [facility] in  [place]     Comment:        Thanks for all that you do,  Colleen

## 2024-01-23 NOTE — TELEPHONE ENCOUNTER
Naltrexone was not previously on her medication list, was added. Please advise if you are going to approve for ordering.

## 2024-01-23 NOTE — ASSESSMENT & PLAN NOTE
See above  Recommend soluble fiber supplementation  Avoid straining or sitting on the toilet for long periods of time  Linzess 145 mc daily  Call with updates

## 2024-01-23 NOTE — ASSESSMENT & PLAN NOTE
CT chest, abdomen and pelvis with contrast November 8, 2022 revealed interval resolution of the inflammatory changes in the upper mid mesentery and peripancreatic region when compared to the prior examination.  No acute process seen.   CT abdomen with contrast July 28, 2022 revealed mild stranding involving the superior mesentery and near the pancreatic head/uncinate.  This may relate to pancreatitis or nonspecific mild mesenteric congestion.  No organized fluid collection or evidence of pancreatic necrosis.   CT scan abdomen and pelvis without contrast June 28, 2022 revealed thickening of the small bowel in the mid and upper abdomen with subtle mesenteric vascular congestion.  An element of enteritis is a concern.  There is mild peripancreatic fat stranding at the level of the uncinate process of the pancreas (series 3, image 49).  An element of pancreatitis is a concern.  Details and other findings as discussed above.  No significant discrepancy with overnight report.   CT scan abdomen and pelvis with contrast May 24, 2022 revealed fatty induration or inflammatory change fall thickening involving the distal stomach and proximal duodenum.  The fatty induration is in the region of the head of the pancreas but the pancreas enhances normally.  Suspect acute inflammatory process arises from the stomach and duodenum.  There is no evidence of pancreatic necrosis.  MRI MRCP without contrast May 23, 2022 revealed acute pancreatitis.  No evidence of choledocholithiasis.  1 cm right renal cyst.  Moderate hepatic steatosis.  Cholecystectomy.  Abdominal ultrasound May 22, 2022 was unremarkable.  CT scan abdomen and pelvis without contrast May 22, 2022 revealed findings consistent with acute interstitial edematous pancreatitis, without associated ductal dilatation, focal lesion, or peripancreatic collection.  No other evidence of acute or suspicious intra-abdominal process.  No significant discrepancy identified in relation to  the teleradiology preliminary report.  Additional details provided above.  Denies problems with pancreatitis since 2022  Will refer for EUS to Dr. Bowen  Call with updates  ER precautions provided  F/u clinic visit with NP in 6 months (after date of scheduled procedure)

## 2024-02-27 ENCOUNTER — OFFICE VISIT (OUTPATIENT)
Dept: INTERNAL MEDICINE | Facility: CLINIC | Age: 44
End: 2024-02-27
Payer: MEDICAID

## 2024-02-27 ENCOUNTER — LAB VISIT (OUTPATIENT)
Dept: LAB | Facility: HOSPITAL | Age: 44
End: 2024-02-27
Attending: STUDENT IN AN ORGANIZED HEALTH CARE EDUCATION/TRAINING PROGRAM
Payer: MEDICAID

## 2024-02-27 VITALS
OXYGEN SATURATION: 98 % | HEIGHT: 68 IN | SYSTOLIC BLOOD PRESSURE: 166 MMHG | RESPIRATION RATE: 20 BRPM | WEIGHT: 283 LBS | BODY MASS INDEX: 42.89 KG/M2 | DIASTOLIC BLOOD PRESSURE: 110 MMHG | TEMPERATURE: 99 F | HEART RATE: 83 BPM

## 2024-02-27 DIAGNOSIS — Z23 NEED FOR INFLUENZA VACCINATION: ICD-10-CM

## 2024-02-27 DIAGNOSIS — K21.9 GASTROESOPHAGEAL REFLUX DISEASE, UNSPECIFIED WHETHER ESOPHAGITIS PRESENT: ICD-10-CM

## 2024-02-27 DIAGNOSIS — E66.01 CLASS 3 SEVERE OBESITY DUE TO EXCESS CALORIES WITH SERIOUS COMORBIDITY AND BODY MASS INDEX (BMI) OF 45.0 TO 49.9 IN ADULT: ICD-10-CM

## 2024-02-27 DIAGNOSIS — Z23 NEED FOR INFLUENZA VACCINATION: Primary | ICD-10-CM

## 2024-02-27 DIAGNOSIS — G47.33 OSA ON CPAP: ICD-10-CM

## 2024-02-27 LAB
ALBUMIN SERPL-MCNC: 3.7 G/DL (ref 3.5–5)
ALBUMIN/GLOB SERPL: 0.8 RATIO (ref 1.1–2)
ALP SERPL-CCNC: 82 UNIT/L (ref 40–150)
ALT SERPL-CCNC: 12 UNIT/L (ref 0–55)
AST SERPL-CCNC: 16 UNIT/L (ref 5–34)
BASOPHILS # BLD AUTO: 0.04 X10(3)/MCL
BASOPHILS NFR BLD AUTO: 0.3 %
BILIRUB SERPL-MCNC: 0.3 MG/DL
BUN SERPL-MCNC: 12.8 MG/DL (ref 7–18.7)
CALCIUM SERPL-MCNC: 8.8 MG/DL (ref 8.4–10.2)
CHLORIDE SERPL-SCNC: 98 MMOL/L (ref 98–107)
CO2 SERPL-SCNC: 32 MMOL/L (ref 22–29)
CREAT SERPL-MCNC: 0.88 MG/DL (ref 0.55–1.02)
EOSINOPHIL # BLD AUTO: 0.06 X10(3)/MCL (ref 0–0.9)
EOSINOPHIL NFR BLD AUTO: 0.5 %
ERYTHROCYTE [DISTWIDTH] IN BLOOD BY AUTOMATED COUNT: 15.4 % (ref 11.5–17)
EST. AVERAGE GLUCOSE BLD GHB EST-MCNC: 108.3 MG/DL
GFR SERPLBLD CREATININE-BSD FMLA CKD-EPI: >60 MLS/MIN/1.73/M2
GLOBULIN SER-MCNC: 4.8 GM/DL (ref 2.4–3.5)
GLUCOSE SERPL-MCNC: 84 MG/DL (ref 74–100)
HBA1C MFR BLD: 5.4 %
HCT VFR BLD AUTO: 42.8 % (ref 37–47)
HGB BLD-MCNC: 14.5 G/DL (ref 12–16)
IMM GRANULOCYTES # BLD AUTO: 0.05 X10(3)/MCL (ref 0–0.04)
IMM GRANULOCYTES NFR BLD AUTO: 0.4 %
LYMPHOCYTES # BLD AUTO: 2.67 X10(3)/MCL (ref 0.6–4.6)
LYMPHOCYTES NFR BLD AUTO: 23.1 %
MCH RBC QN AUTO: 26.6 PG (ref 27–31)
MCHC RBC AUTO-ENTMCNC: 33.9 G/DL (ref 33–36)
MCV RBC AUTO: 78.4 FL (ref 80–94)
MONOCYTES # BLD AUTO: 0.93 X10(3)/MCL (ref 0.1–1.3)
MONOCYTES NFR BLD AUTO: 8.1 %
NEUTROPHILS # BLD AUTO: 7.8 X10(3)/MCL (ref 2.1–9.2)
NEUTROPHILS NFR BLD AUTO: 67.6 %
NRBC BLD AUTO-RTO: 0 %
PLATELET # BLD AUTO: 307 X10(3)/MCL (ref 130–400)
PMV BLD AUTO: 10.5 FL (ref 7.4–10.4)
POTASSIUM SERPL-SCNC: 3.2 MMOL/L (ref 3.5–5.1)
PROT SERPL-MCNC: 8.5 GM/DL (ref 6.4–8.3)
RBC # BLD AUTO: 5.46 X10(6)/MCL (ref 4.2–5.4)
SODIUM SERPL-SCNC: 141 MMOL/L (ref 136–145)
TSH SERPL-ACNC: 1.18 UIU/ML (ref 0.35–4.94)
WBC # SPEC AUTO: 11.55 X10(3)/MCL (ref 4.5–11.5)

## 2024-02-27 PROCEDURE — 85025 COMPLETE CBC W/AUTO DIFF WBC: CPT

## 2024-02-27 PROCEDURE — 80053 COMPREHEN METABOLIC PANEL: CPT

## 2024-02-27 PROCEDURE — 84443 ASSAY THYROID STIM HORMONE: CPT

## 2024-02-27 PROCEDURE — 90471 IMMUNIZATION ADMIN: CPT | Mod: PBBFAC

## 2024-02-27 PROCEDURE — 36415 COLL VENOUS BLD VENIPUNCTURE: CPT

## 2024-02-27 PROCEDURE — 99214 OFFICE O/P EST MOD 30 MIN: CPT | Mod: PBBFAC,25 | Performed by: STUDENT IN AN ORGANIZED HEALTH CARE EDUCATION/TRAINING PROGRAM

## 2024-02-27 PROCEDURE — 83036 HEMOGLOBIN GLYCOSYLATED A1C: CPT

## 2024-02-27 PROCEDURE — 90686 IIV4 VACC NO PRSV 0.5 ML IM: CPT | Mod: PBBFAC

## 2024-02-27 RX ORDER — NALTREXONE HYDROCHLORIDE 50 MG/1
12.5 TABLET, FILM COATED ORAL DAILY
Qty: 30 TABLET | Refills: 1 | Status: SHIPPED | OUTPATIENT
Start: 2024-02-27

## 2024-02-27 RX ORDER — BUPROPION HYDROCHLORIDE 150 MG/1
150 TABLET, EXTENDED RELEASE ORAL 2 TIMES DAILY
Qty: 60 TABLET | Refills: 11 | Status: SHIPPED | OUTPATIENT
Start: 2024-02-27 | End: 2025-02-26

## 2024-02-27 RX ORDER — NIFEDIPINE 60 MG/1
60 TABLET, EXTENDED RELEASE ORAL DAILY
Qty: 90 TABLET | Refills: 1 | Status: SHIPPED | OUTPATIENT
Start: 2024-02-27 | End: 2024-08-25

## 2024-02-27 RX ORDER — CHLORTHALIDONE 25 MG/1
12.5 TABLET ORAL DAILY
Qty: 15 TABLET | Refills: 11 | Status: SHIPPED | OUTPATIENT
Start: 2024-02-27 | End: 2025-02-26

## 2024-02-27 RX ORDER — TRAZODONE HYDROCHLORIDE 50 MG/1
50 TABLET ORAL NIGHTLY
Qty: 30 TABLET | Refills: 11 | Status: SHIPPED | OUTPATIENT
Start: 2024-02-27 | End: 2025-02-26

## 2024-02-27 RX ORDER — PANTOPRAZOLE SODIUM 40 MG/1
40 TABLET, DELAYED RELEASE ORAL DAILY
Qty: 30 TABLET | Refills: 11 | Status: SHIPPED | OUTPATIENT
Start: 2024-02-27 | End: 2025-02-26

## 2024-02-27 RX ADMIN — INFLUENZA VIRUS VACCINE 0.5 ML: 15; 15; 15; 15 SUSPENSION INTRAMUSCULAR at 01:02

## 2024-02-27 NOTE — PROGRESS NOTES
Patient Name: Abdi Combs  MRN: [id number]              [facility]    Subjective:        Ms. Abdi Combs is a 42 y.o. female w/ PMH hypertension, GERD, anxiety/depression, allergic rhinitis. s/p FESS 8/11/17 who comes to clinic today for follow up.  Patient has been having nonspecific abdominal pain s/p 1-2 hours after the.  Patient says this pain is chronic and last.  Patient was hospitalized in May and was found to have edematous pancreatitis patient underwent MRCP with no underlying blockage.  Patient's triglyceride and does not have any alcohol use, patient's pancreatitis was then attributed to lisinopril even otherwise noted chronic changes noted.      7/2022 Patient has been miserable she says she is scared of eating, she says that she has only been eating bananas because she says that it is good for your stomach and causes less pain.  Patient is still having pain.  Patient also says that she also been having some migraines which have been intermittent, she says she does not have any migraines at this time and she has an appointment later this month when Neurology.     10/2022:  Patient says that her chest pain is much improved, but she does have some chest pain that radiates to her back similar to the  pain when she had her pancreatitis.  Patient says that this is seldomly, approximately 1-2 times per month.  These episodes only last approximately 15 minutes.  Patient says that she is able to eat without any issues, except for things that or spicy.  Patient's migraines are much improved s/p treatment.     04/2023:   -patient is having issues with anxiety, says that she is unable to sleep.  She is taking Klonopin for her anxiety.  She says that she had been taking some antidepressant in the past however her doctor has put her on Ativan and now currently on Klonopin.  Her main complaint today is knee pain which she says has gotten worse, she has continued to gain weight and  currently has BMI 47.55 she is not heard back from the surgeons for possible knee replacement.  She has not done physical therapy at all.    11/2023  Anxiety and depression symptoms are uncontrolled and worse at this time. Patient says that she lost her family member who was shot, she feels guilty because that she was in a meeting, and unable to see him. Denies any SI. Sleep is an issue. Patient would like contrave.     2/2024  More bills than work. Sleep is 3-4 hours, trouble going to sleep. Anhedonia. Guilt, wants more than she has. Energy issues. Concentration. No si/hi. Appetite over eating. Patient would like to continue contrave. She has severe MDD, and usually just stays in the dark without doing anything.       Review of Systems:  The remainder of the 14 system ROS is noncontributory or negative unless mentioned/reviewed above.    Objective:     Audio:  No objective data        Laboratory:  Lab Results   Component Value Date    WBC 8.2 02/02/2023    HGB 13.3 02/02/2023    HCT 41.0 02/02/2023     02/02/2023    MCV 75.6 (L) 02/02/2023    RDW 15.5 02/02/2023    IRON 41 (L) 02/02/2023    TIBC 241 (L) 02/02/2023    FERRITIN 48.50 02/02/2023     Lab Results   Component Value Date    HGBA1C 5.4 05/22/2022    .8 09/08/2021    CREATININE 0.71 02/02/2023    Lab Results   Component Value Date     02/02/2023    K 3.5 02/02/2023    CHLORIDE 102 02/02/2023    CO2 28 02/02/2023    BUN 8.5 02/02/2023    CREATININE 0.71 02/02/2023    CALCIUM 9.5 02/02/2023    MG 1.90 02/02/2023    PHOS 4.4 02/02/2023     Lab Results   Component Value Date    TSH 1.453 02/02/2023    JRBFJO9XHKR 1.06 02/02/2023            Current Medications:  Current Outpatient Medications   Medication Instructions    buPROPion (WELLBUTRIN SR) 150 mg, Oral, 2 times daily    cholecalciferol (vitamin D3) (VITAMIN D3) 1,000 Units, Oral, Daily    clonazePAM (KLONOPIN) 2 mg, Oral, Daily    ferrous sulfate 324 mg, Oral, Every other day     "fluticasone propionate (FLONASE) 50 mcg/actuation nasal spray 1 spray, Nasal, Daily PRN    gabapentin (NEURONTIN) 300 mg, Oral, Nightly    hydroCHLOROthiazide (HYDRODIURIL) 25 mg, Oral, Daily    ketorolac (TORADOL) 10 mg, Oral, 2 times daily    NIFEdipine (PROCARDIA-XL) 90 mg, Oral, Daily    pantoprazole (PROTONIX) 40 mg, Oral, Daily    sumatriptan (IMITREX) 100 mg, Oral, Every 12 hours PRN    venlafaxine (EFFEXOR-XR) 150 mg, Oral, 2 times daily          2D ECHO Results  No results found for this or any previous visit.      Pulmonary Functions Testing Results:    No results found for: "FEV1", "FVC", "VYO8YYB", "TLC", "DLCO"  Assessment and Plan:        Health Maintenance Due   Topic Date Due    Colorectal Cancer Screening  Never done    Mammogram  07/29/2023    Influenza Vaccine (1) 09/01/2023    COVID-19 Vaccine (3 - 2023-24 season) 09/01/2023      Migraines:   -hypertensive today, but not taking medication  -Procardia and chlorthalidone stressed importance of medication    Allergies:  -cetirizine     MDD:  Wellbutrin 150 bid, Trazodone 50 qhs for sleep    Morbid obesity:  -contrave     Migraines:  Once per month, continue with imitrex prn      Griselda Carroll MD  Internal Medicine PGY-II        "

## 2024-03-04 ENCOUNTER — OFFICE VISIT (OUTPATIENT)
Dept: NEUROLOGY | Facility: CLINIC | Age: 44
End: 2024-03-04
Payer: MEDICAID

## 2024-03-04 DIAGNOSIS — G47.33 OSA ON CPAP: ICD-10-CM

## 2024-03-04 DIAGNOSIS — G43.009 MIGRAINE WITHOUT AURA AND RESPONSIVE TO TREATMENT: Primary | ICD-10-CM

## 2024-03-04 DIAGNOSIS — G47.00 INSOMNIA, UNSPECIFIED TYPE: ICD-10-CM

## 2024-03-04 DIAGNOSIS — E66.01 CLASS 3 SEVERE OBESITY DUE TO EXCESS CALORIES WITH SERIOUS COMORBIDITY AND BODY MASS INDEX (BMI) OF 45.0 TO 49.9 IN ADULT: ICD-10-CM

## 2024-03-04 PROCEDURE — G2211 COMPLEX E/M VISIT ADD ON: HCPCS | Mod: 95,,, | Performed by: NURSE PRACTITIONER

## 2024-03-04 PROCEDURE — 1159F MED LIST DOCD IN RCRD: CPT | Mod: CPTII,95,, | Performed by: NURSE PRACTITIONER

## 2024-03-04 PROCEDURE — 99214 OFFICE O/P EST MOD 30 MIN: CPT | Mod: 95,,, | Performed by: NURSE PRACTITIONER

## 2024-03-04 PROCEDURE — 3044F HG A1C LEVEL LT 7.0%: CPT | Mod: CPTII,95,, | Performed by: NURSE PRACTITIONER

## 2024-03-04 PROCEDURE — 1160F RVW MEDS BY RX/DR IN RCRD: CPT | Mod: CPTII,95,, | Performed by: NURSE PRACTITIONER

## 2024-03-04 RX ORDER — KETOROLAC TROMETHAMINE 10 MG/1
10 TABLET, FILM COATED ORAL EVERY 12 HOURS PRN
COMMUNITY
Start: 2023-11-07 | End: 2024-03-08 | Stop reason: SDUPTHER

## 2024-03-04 RX ORDER — FERROUS SULFATE 324(65)MG
324 TABLET, DELAYED RELEASE (ENTERIC COATED) ORAL DAILY
COMMUNITY
Start: 2023-09-12 | End: 2024-03-11

## 2024-03-04 RX ORDER — GABAPENTIN 300 MG/1
300 CAPSULE ORAL NIGHTLY
COMMUNITY
Start: 2023-11-07 | End: 2024-03-08 | Stop reason: SDUPTHER

## 2024-03-04 RX ORDER — VENLAFAXINE HYDROCHLORIDE 150 MG/1
150 CAPSULE, EXTENDED RELEASE ORAL DAILY
COMMUNITY
Start: 2023-07-27

## 2024-03-04 NOTE — PROGRESS NOTES
[facility] Neurology Telemedicine Follow Up Visit Note    This is a real-time audio/video visit that was performed with the originating site at patient's home and the distant site, [facility]. Verbal consent to participate in interactive audio & video visit was obtained.    I discussed with the patient regarding the nature of our telehealth visits, that:    - Our sessions are not being recorded and that personal health information is protected  - Provider would evaluate the patient and recommend diagnostics and treatments based on my assessment  - [facility] will provide follow up care in person if/when the patient needs it.       Subjective:      Patient ID: Abdi Combs is a 43 y.o. female.    Chief Complaint: Migraine (Patient reports migraines have been bad due to current sinus infection.)    HPI   This is a 43 year old Right Handed F with PMHX of HTN, Depression, Anxiety, hysterectomy, LAURA on CPAP, pancreatitis was referred for Migraine w/ status migrainosus. She was last seen on 11/7/2023. During that visit, Sumatriptan 100mg daily PRN was continued, warren 300 mg nightly and Toradol 10 mg PO BID PRN were initiated for migraine.    Today, Pt states she is currently being treated for sinus infection. Was provided a cortisone shot, Flonase and Abx. Did not start gabapentin as she thought it was for nerve pain. Did not try Toradol either. Is taking 2 sumatriptan at once for migraine. Increased in September after several deaths in the family.  Utilizing CPAP machine nightly, but feels like hose may be leaking. Was dx by sleep center in [place] [facility]. Did not call sleep center to discuss supplies as previously instructed.    Age of Onset - around the time of puberty    Semiology - bifrontal, pounding, 10/10, lasting 1 day, w/ nausea and photophobia; .    Frequency - <1 headache days/month; every other day for  last month    Risk Factors -  - Family history of headache disorder? Mom with headache  - History of Head Trauma? denied  - History of CNS infection? denied  - History of underlying mood disorder? anxiety, depression.  - Illicit drug use? denied  - Tobacco use? denied  - History of sleep disorder? moderate LAURA. Started on CPAP in 9/2020. Sleeping better with CPAP.    Workup -  - MRI Brain: denied  [facility] identified    Current ppx meds -  Procardia XL 90mg   Venlafaxine XR 150mg BID    Current abortive meds -  Naproxen prn - BID  Sumatriptan 100mg PO Qday PRN    Prior ppx meds -  Elavil [id number] - ineffective  TPM 100mg daily (1/2018 - 4/2018) - ineffective  Lexapro (stopped 2/26/2020)  Effexor 150mg daily (5/27/2020 - 6/27/2020) - dizziness  Ajovy once per month (11/16/2020 - 8/16/2021) - ineffective  Lisinopril 10mg daily    Current abortive meds -  Sumatriptan 100mg Oral TAB PRN - effective    Prior abortive meds -   Compazine - dystonia  Fioricet prn - only partially effective. Has rebound headaches afterwards.    Results for orders placed or performed in visit on 02/27/24   Comprehensive Metabolic Panel   Result Value Ref Range    Sodium Level 141 136 - 145 mmol/L    Potassium Level 3.2 (L) 3.5 - 5.1 mmol/L    Chloride 98 98 - 107 mmol/L    Carbon Dioxide 32 (H) 22 - 29 mmol/L    Glucose Level 84 74 - 100 mg/dL    Blood Urea Nitrogen 12.8 7.0 - 18.7 mg/dL    Creatinine 0.88 0.55 - 1.02 mg/dL    Calcium Level Total 8.8 8.4 - 10.2 mg/dL    Protein Total 8.5 (H) 6.4 - 8.3 gm/dL    Albumin Level 3.7 3.5 - 5.0 g/dL    Globulin 4.8 (H) 2.4 - 3.5 gm/dL    Albumin/Globulin Ratio 0.8 (L) 1.1 - 2.0 ratio    Bilirubin Total 0.3 <=1.5 mg/dL    Alkaline Phosphatase 82 40 - 150 unit/L    Alanine Aminotransferase 12 0 - 55 unit/L    Aspartate Aminotransferase 16 5 - 34 unit/L    eGFR >60 mls/min/1.73/m2   TSH   Result Value Ref Range    TSH 1.175 0.350 - 4.940 uIU/mL   Hemoglobin  A1C   Result Value Ref Range    Hemoglobin A1c 5.4 <=7.0 %    Estimated Average Glucose 108.3 mg/dL   CBC with Differential   Result Value Ref Range    WBC 11.55 (H) 4.50 - 11.50 x10(3)/mcL    RBC 5.46 (H) 4.20 - 5.40 x10(6)/mcL    Hgb 14.5 12.0 - 16.0 g/dL    Hct 42.8 37.0 - 47.0 %    MCV 78.4 (L) 80.0 - 94.0 fL    MCH 26.6 (L) 27.0 - 31.0 pg    MCHC 33.9 33.0 - 36.0 g/dL    RDW 15.4 11.5 - 17.0 %    Platelet 307 130 - 400 x10(3)/mcL    MPV 10.5 (H) 7.4 - 10.4 fL    Neut % 67.6 %    Lymph % 23.1 %    Mono % 8.1 %    Eos % 0.5 %    Basophil % 0.3 %    Lymph # 2.67 0.6 - 4.6 x10(3)/mcL    Neut # 7.80 2.1 - 9.2 x10(3)/mcL    Mono # 0.93 0.1 - 1.3 x10(3)/mcL    Eos # 0.06 0 - 0.9 x10(3)/mcL    Baso # 0.04 <=0.2 x10(3)/mcL    IG# 0.05 (H) 0 - 0.04 x10(3)/mcL    IG% 0.4 %    NRBC% 0.0 %       Review of Systems    Constitutional: no fever, fatigue, weakness  Eye: no vision loss, eye redness, drainage, or pain  ENMT: no sore throat, ear pain, sinus pain/congestion, nasal congestion/drainage  Respiratory: no cough, no wheezing, no shortness of breath  Cardiovascular: no chest pain, no palpitations, no edema  Gastrointestinal: no nausea, vomiting, or diarrhea. No abdominal pain  Genitourinary: no dysuria, no urinary frequency or urgency, no hematuria  Hema/Lymph: no abnormal bruising or bleeding  Endocrine: no heat or cold intolerance, no excessive thirst or excessive urination  Musculoskeletal: no muscle or joint pain, no joint swelling  Integumentary: no skin rash or abnormal lesion  Psychiatric: no depressed mood, no anxiety, no visual/auditory hallucinations, no delusions, no suicidal or homicidal ideation  Neurologic: as per HPI    Objective:      Physical Exam    General: well-developed well-nourished in no acute distress  Eye: clear conjunctiva, eyelids normal  HENT: oropharynx without erythema/exudate, oropharynx mucosal surface moist  Neck: full range of motion  Respiratory: no distress on RA  Cardiovascular: unable  to assess due to nature of visit   Gastrointestinal: round, no distension, no guarding  Musculoskeletal: full range of motion of all extremities  Integumentary: no visible rashes rashes.    Neurologic:  Mental Status- Alert and Oriented to time, self, place, Speech is fluent, with intact repetition, naming, reading, and comprehension., 3/3 in registration, 3/3 in delayed recall, No Dysarthria.  CN II-XII - CN I Deferred, unable to assess fundus, CN V1-3, visual field due to nature of visit, VA grossly normal to motion and finger counting, No ptosis b/l, EOMI w/o nystagmus, T/U midline, Shoulder shrug symmetric b/l,Face Symmetric.  Motor - unable to assess tone and confrontation due to nature of visit, bulk appears grossly normal, no gross abnormal involuntary or voluntary movements, FFM nml b/l, No visible satelliting on orbiting, No pronator drift,  Sensory - unable to assess due to nature of visit  Reflexes - unable to assess due to nature of visit  Cerebellar Exam - FNF wnl b/l no intention tremor.  Gait - Normal, good arm swing b/l.    Assessment:       This is a 43 year old Right Handed F with PMHX of HTN, Depression, Anxiety, hysterectomy, LAURA on CPAP (not currently), pancreatitis, with Migraine w/ status migrainosus, stable with relative headache freedom. Had a history of Allyson Hunt Syndrome in 12/2020. Utilizing CPAP and feels refreshed upon awakening, but believes hose may be leaking. Did not call for new supplies. Did not try warren or toradol. Taking two sumatriptan for migraine relief. HTN currently uncontrolled. Being treated for sinus infection, but denies relief.    1. Migraine without aura and responsive to treatment    2. Class 3 severe obesity due to excess calories with serious comorbidity and body mass index (BMI) of 45.0 to 49.9 in adult    3. LAURA on CPAP    4. Insomnia, unspecified type      Plan:       [] d/c Sumatriptan 100mg BID PRN at onset of migraine  [] start Ubrelvy 100mg PO BID at onset  of migraine; triptans contraindicated in uncontrolled HTN  [] start warren 300mg nightly for migraine and sleep  [] start Toradol 10mg PO BID PRN for migraine  [] call [facility] for guidance on ordering supplies  [] start exercise program 30 min daily x 5 days weekly for overall health and wellness    RTC 4 months - TM okay    Headache education provided - including good sleep hygiene, stress management, medication overuse education provided - using more 3 OTC per week may worsen headaches, High intensity interval training has shown to reduce headache frequency. Low carb, high protein has shown to reduce headache frequency. Patient is instructed to keep headache diary.    I have explained the treatment plan, diagnosis, and prognosis to the patient, caretaker, family. All questions are answered to the best of my knowledge.    Face to Face Time 30 minute, including, counseling, education, review of test results, relevant medical records, and coordination of care.

## 2024-03-08 ENCOUNTER — PATIENT MESSAGE (OUTPATIENT)
Dept: NEUROLOGY | Facility: CLINIC | Age: 44
End: 2024-03-08
Payer: MEDICAID

## 2024-03-08 DIAGNOSIS — G43.009 MIGRAINE WITHOUT AURA AND RESPONSIVE TO TREATMENT: ICD-10-CM

## 2024-03-08 RX ORDER — KETOROLAC TROMETHAMINE 10 MG/1
10 TABLET, FILM COATED ORAL EVERY 12 HOURS PRN
Qty: 30 TABLET | Refills: 0 | Status: SHIPPED | OUTPATIENT
Start: 2024-03-08

## 2024-03-08 RX ORDER — GABAPENTIN 300 MG/1
300 CAPSULE ORAL NIGHTLY
Qty: 30 CAPSULE | Refills: 11 | Status: SHIPPED | OUTPATIENT
Start: 2024-03-08 | End: 2025-03-09

## 2024-04-10 ENCOUNTER — HOSPITAL ENCOUNTER (EMERGENCY)
Facility: HOSPITAL | Age: 44
Discharge: HOME | End: 2024-04-10
Attending: EMERGENCY MEDICINE
Payer: MEDICAID

## 2024-04-10 VITALS
SYSTOLIC BLOOD PRESSURE: 161 MMHG | DIASTOLIC BLOOD PRESSURE: 89 MMHG | BODY MASS INDEX: 45.2 KG/M2 | RESPIRATION RATE: 19 BRPM | TEMPERATURE: 98 F | WEIGHT: 288 LBS | HEIGHT: 67 IN | HEART RATE: 78 BPM | OXYGEN SATURATION: 99 %

## 2024-04-10 DIAGNOSIS — F41.0 PANIC ATTACK: ICD-10-CM

## 2024-04-10 DIAGNOSIS — R06.02 SOB (SHORTNESS OF BREATH): Primary | ICD-10-CM

## 2024-04-10 DIAGNOSIS — R07.9 CHEST PAIN, UNSPECIFIED TYPE: ICD-10-CM

## 2024-04-10 DIAGNOSIS — F41.9 ANXIETY: ICD-10-CM

## 2024-04-10 LAB
ALBUMIN SERPL-MCNC: 3.5 G/DL (ref 3.5–5)
ALBUMIN/GLOB SERPL: 0.8 RATIO (ref 1.1–2)
ALP SERPL-CCNC: 66 UNIT/L (ref 40–150)
ALT SERPL-CCNC: 14 UNIT/L (ref 0–55)
APTT PPP: 27.6 SECONDS (ref 23.2–33.7)
AST SERPL-CCNC: 20 UNIT/L (ref 5–34)
BASOPHILS # BLD AUTO: 0.02 X10(3)/MCL
BASOPHILS NFR BLD AUTO: 0.2 %
BILIRUB SERPL-MCNC: 0.7 MG/DL
BUN SERPL-MCNC: 13.3 MG/DL (ref 7–18.7)
CALCIUM SERPL-MCNC: 9.1 MG/DL (ref 8.4–10.2)
CHLORIDE SERPL-SCNC: 101 MMOL/L (ref 98–107)
CO2 SERPL-SCNC: 27 MMOL/L (ref 22–29)
CREAT SERPL-MCNC: 0.76 MG/DL (ref 0.55–1.02)
EOSINOPHIL # BLD AUTO: 0.01 X10(3)/MCL (ref 0–0.9)
EOSINOPHIL NFR BLD AUTO: 0.1 %
ERYTHROCYTE [DISTWIDTH] IN BLOOD BY AUTOMATED COUNT: 15 % (ref 11.5–17)
ETHANOL SERPL-MCNC: <10 MG/DL
GFR SERPLBLD CREATININE-BSD FMLA CKD-EPI: >60 MLS/MIN/1.73/M2
GLOBULIN SER-MCNC: 4.5 GM/DL (ref 2.4–3.5)
GLUCOSE SERPL-MCNC: 108 MG/DL (ref 74–100)
HCT VFR BLD AUTO: 42.1 % (ref 37–47)
HGB BLD-MCNC: 14.3 G/DL (ref 12–16)
IMM GRANULOCYTES # BLD AUTO: 0.04 X10(3)/MCL (ref 0–0.04)
IMM GRANULOCYTES NFR BLD AUTO: 0.4 %
INR PPP: 1.1
LYMPHOCYTES # BLD AUTO: 0.98 X10(3)/MCL (ref 0.6–4.6)
LYMPHOCYTES NFR BLD AUTO: 9.1 %
MCH RBC QN AUTO: 26.7 PG (ref 27–31)
MCHC RBC AUTO-ENTMCNC: 34 G/DL (ref 33–36)
MCV RBC AUTO: 78.7 FL (ref 80–94)
MONOCYTES # BLD AUTO: 0.84 X10(3)/MCL (ref 0.1–1.3)
MONOCYTES NFR BLD AUTO: 7.8 %
NEUTROPHILS # BLD AUTO: 8.92 X10(3)/MCL (ref 2.1–9.2)
NEUTROPHILS NFR BLD AUTO: 82.4 %
PLATELET # BLD AUTO: 320 X10(3)/MCL (ref 130–400)
PMV BLD AUTO: 10.5 FL (ref 7.4–10.4)
POC CARDIAC TROPONIN I: 0 NG/ML (ref 0–0.08)
POTASSIUM SERPL-SCNC: 3 MMOL/L (ref 3.5–5.1)
PROT SERPL-MCNC: 8 GM/DL (ref 6.4–8.3)
PROTHROMBIN TIME: 11.1 SECONDS (ref 12.5–14.5)
RBC # BLD AUTO: 5.35 X10(6)/MCL (ref 4.2–5.4)
SAMPLE: NORMAL
SODIUM SERPL-SCNC: 139 MMOL/L (ref 136–145)
TROPONIN I SERPL-MCNC: <0.01 NG/ML (ref 0–0.04)
WBC # SPEC AUTO: 10.81 X10(3)/MCL (ref 4.5–11.5)

## 2024-04-10 PROCEDURE — 85730 THROMBOPLASTIN TIME PARTIAL: CPT | Performed by: STUDENT IN AN ORGANIZED HEALTH CARE EDUCATION/TRAINING PROGRAM

## 2024-04-10 PROCEDURE — 85025 COMPLETE CBC W/AUTO DIFF WBC: CPT | Performed by: STUDENT IN AN ORGANIZED HEALTH CARE EDUCATION/TRAINING PROGRAM

## 2024-04-10 PROCEDURE — 96372 THER/PROPH/DIAG INJ SC/IM: CPT | Performed by: STUDENT IN AN ORGANIZED HEALTH CARE EDUCATION/TRAINING PROGRAM

## 2024-04-10 PROCEDURE — 84484 ASSAY OF TROPONIN QUANT: CPT | Performed by: STUDENT IN AN ORGANIZED HEALTH CARE EDUCATION/TRAINING PROGRAM

## 2024-04-10 PROCEDURE — 25000003 PHARM REV CODE 250: Performed by: STUDENT IN AN ORGANIZED HEALTH CARE EDUCATION/TRAINING PROGRAM

## 2024-04-10 PROCEDURE — 63600175 PHARM REV CODE 636 W HCPCS: Performed by: EMERGENCY MEDICINE

## 2024-04-10 PROCEDURE — 82077 ASSAY SPEC XCP UR&BREATH IA: CPT | Performed by: STUDENT IN AN ORGANIZED HEALTH CARE EDUCATION/TRAINING PROGRAM

## 2024-04-10 PROCEDURE — 96372 THER/PROPH/DIAG INJ SC/IM: CPT | Performed by: EMERGENCY MEDICINE

## 2024-04-10 PROCEDURE — 25000003 PHARM REV CODE 250: Performed by: EMERGENCY MEDICINE

## 2024-04-10 PROCEDURE — 63600175 PHARM REV CODE 636 W HCPCS: Performed by: STUDENT IN AN ORGANIZED HEALTH CARE EDUCATION/TRAINING PROGRAM

## 2024-04-10 PROCEDURE — 85610 PROTHROMBIN TIME: CPT | Performed by: STUDENT IN AN ORGANIZED HEALTH CARE EDUCATION/TRAINING PROGRAM

## 2024-04-10 PROCEDURE — 80053 COMPREHEN METABOLIC PANEL: CPT | Performed by: STUDENT IN AN ORGANIZED HEALTH CARE EDUCATION/TRAINING PROGRAM

## 2024-04-10 PROCEDURE — 93010 ELECTROCARDIOGRAM REPORT: CPT | Mod: ,,, | Performed by: INTERNAL MEDICINE

## 2024-04-10 PROCEDURE — 93005 ELECTROCARDIOGRAM TRACING: CPT

## 2024-04-10 PROCEDURE — 99285 EMERGENCY DEPT VISIT HI MDM: CPT | Mod: 25

## 2024-04-10 PROCEDURE — 84484 ASSAY OF TROPONIN QUANT: CPT

## 2024-04-10 RX ORDER — ACETAMINOPHEN 500 MG
1000 TABLET ORAL
Status: COMPLETED | OUTPATIENT
Start: 2024-04-10 | End: 2024-04-10

## 2024-04-10 RX ORDER — DIPHENHYDRAMINE HYDROCHLORIDE 50 MG/ML
50 INJECTION INTRAMUSCULAR; INTRAVENOUS
Status: COMPLETED | OUTPATIENT
Start: 2024-04-10 | End: 2024-04-10

## 2024-04-10 RX ORDER — KETOROLAC TROMETHAMINE 30 MG/ML
30 INJECTION, SOLUTION INTRAMUSCULAR; INTRAVENOUS
Status: COMPLETED | OUTPATIENT
Start: 2024-04-10 | End: 2024-04-10

## 2024-04-10 RX ORDER — CLONAZEPAM 1 MG/1
2 TABLET ORAL
Status: COMPLETED | OUTPATIENT
Start: 2024-04-10 | End: 2024-04-10

## 2024-04-10 RX ORDER — ORPHENADRINE CITRATE 30 MG/ML
30 INJECTION INTRAMUSCULAR; INTRAVENOUS
Status: COMPLETED | OUTPATIENT
Start: 2024-04-10 | End: 2024-04-10

## 2024-04-10 RX ORDER — DIPHENHYDRAMINE HYDROCHLORIDE 50 MG/ML
50 INJECTION INTRAMUSCULAR; INTRAVENOUS
Status: DISCONTINUED | OUTPATIENT
Start: 2024-04-10 | End: 2024-04-10

## 2024-04-10 RX ADMIN — KETOROLAC TROMETHAMINE 30 MG: 30 INJECTION, SOLUTION INTRAMUSCULAR at 05:04

## 2024-04-10 RX ADMIN — CLONAZEPAM 2 MG: 1 TABLET ORAL at 05:04

## 2024-04-10 RX ADMIN — DIPHENHYDRAMINE HYDROCHLORIDE 50 MG: 50 INJECTION INTRAMUSCULAR; INTRAVENOUS at 07:04

## 2024-04-10 RX ADMIN — ACETAMINOPHEN 1000 MG: 500 TABLET ORAL at 07:04

## 2024-04-10 RX ADMIN — ORPHENADRINE CITRATE 30 MG: 60 INJECTION INTRAMUSCULAR; INTRAVENOUS at 07:04

## 2024-04-10 NOTE — ED NOTES
Pt presents via aasi after anxiety attack- hyperventilating pta-diaphoretic/ reports cramping to hands/legs- medicated - see mar.

## 2024-04-10 NOTE — Clinical Note
"Comarita "Comarita" Garret was seen and treated in our emergency department on 4/10/2024.  She may return to work on 04/13/2024.       If you have any questions or concerns, please don't hesitate to call.       RN    "
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
## 2024-04-11 ENCOUNTER — PATIENT MESSAGE (OUTPATIENT)
Dept: INTERNAL MEDICINE | Facility: CLINIC | Age: 44
End: 2024-04-11
Payer: MEDICAID

## 2024-04-11 DIAGNOSIS — G43.009 MIGRAINE WITHOUT AURA AND RESPONSIVE TO TREATMENT: ICD-10-CM

## 2024-04-11 LAB
OHS QRS DURATION: 86 MS
OHS QTC CALCULATION: 486 MS

## 2024-04-11 NOTE — ED NOTES
Noted pt to be resting comfortably  when  helathcare  not in room- pt awakens after  and  begins to cry/hyperventilate and c/o rt arm pain/cramping. Has periods of panic/crying then calms herself.

## 2024-04-11 NOTE — TELEPHONE ENCOUNTER
Contacted patient ID and  verified. Patient informed of f/u appt with Dr. Carroll on 2024 at 2:30pm and to contact Dr. Melgoza for increased anxiety issues. Patient verbalized complete understanding,

## 2024-04-11 NOTE — DISCHARGE INSTRUCTIONS
Follow-up with your primary care physician.      Follow-up with the psychiatrist.      Return to the emergency department if any new or worsening symptoms, chest pain, shortness of breath, nausea, vomiting, fever, difficulty breathing, or any other concerns.

## 2024-04-11 NOTE — ED NOTES
Pt calm now- restign in bed visiting w family /friends.   Dr espinoza aware pt tried to get urine sample but was unsuccessful- plan to update pt and dc home

## 2024-04-30 ENCOUNTER — PATIENT MESSAGE (OUTPATIENT)
Dept: NEUROLOGY | Facility: CLINIC | Age: 44
End: 2024-04-30
Payer: MEDICAID

## 2024-04-30 DIAGNOSIS — G43.009 MIGRAINE WITHOUT AURA AND RESPONSIVE TO TREATMENT: Primary | ICD-10-CM

## 2024-04-30 RX ORDER — DEXAMETHASONE 4 MG/1
4 TABLET ORAL EVERY 12 HOURS
Qty: 10 TABLET | Refills: 0 | Status: SHIPPED | OUTPATIENT
Start: 2024-04-30 | End: 2024-05-05

## 2024-06-07 ENCOUNTER — PATIENT MESSAGE (OUTPATIENT)
Dept: NEUROLOGY | Facility: CLINIC | Age: 44
End: 2024-06-07
Payer: MEDICAID

## 2024-06-18 NOTE — TELEPHONE ENCOUNTER
Good morning again,    I spoke with the pharmacy. They ran the medication for the correct dosage instructions and are able to fill it for a $3 copay. They are getting it ready for you :)    Thank you,  RAMIRO Gaytan

## 2024-06-19 ENCOUNTER — TELEPHONE (OUTPATIENT)
Dept: INTERNAL MEDICINE | Facility: CLINIC | Age: 44
End: 2024-06-19
Payer: MEDICAID

## 2024-06-19 DIAGNOSIS — M19.90 OSTEOARTHRITIS, UNSPECIFIED OSTEOARTHRITIS TYPE, UNSPECIFIED SITE: Primary | ICD-10-CM

## 2024-06-19 NOTE — TELEPHONE ENCOUNTER
DR. LIVINGSTON,      [facility] IS ASKING THAT YOU RESEND THE PT REFERRAL TO:    [facility]- [facility]    PT HAS AN APPT SCHEDULED TOMORROW 6/20/2024 @ 1:30 PM    FAX ORDER TO:  [id number],  ATTN :  DMITRI

## 2024-06-26 ENCOUNTER — TELEPHONE (OUTPATIENT)
Dept: NEUROLOGY | Facility: CLINIC | Age: 44
End: 2024-06-26
Payer: MEDICAID

## 2024-06-26 NOTE — TELEPHONE ENCOUNTER
----- Message from Natalee Brand sent at 6/26/2024  3:20 PM CDT -----  Pt would like call back from nurse regarding sleep study, pt can be reached @ [phone]

## 2024-06-26 NOTE — TELEPHONE ENCOUNTER
Spoke with patient, she states she called [facility] and was told she needs a new referral, she states it has been several years since her last sleep study.    I called sleep center ([phone]) to get clarification on referral, and if I need to send it to them or Dr. Burger's office. No answer, LVM to return call.

## 2024-06-27 DIAGNOSIS — G47.33 OBSTRUCTIVE SLEEP APNEA SYNDROME: ICD-10-CM

## 2024-06-27 DIAGNOSIS — G47.33 OSA ON CPAP: Primary | ICD-10-CM

## 2024-06-27 NOTE — TELEPHONE ENCOUNTER
Spoke with sleep center, patient is not in their system and they need new order/referral for sleep study faxed to [phone]. Can we order this referral?

## 2024-07-15 ENCOUNTER — OFFICE VISIT (OUTPATIENT)
Dept: NEUROLOGY | Facility: CLINIC | Age: 44
End: 2024-07-15
Payer: MEDICAID

## 2024-07-15 ENCOUNTER — PATIENT MESSAGE (OUTPATIENT)
Dept: GASTROENTEROLOGY | Facility: CLINIC | Age: 44
End: 2024-07-15
Payer: MEDICAID

## 2024-07-15 DIAGNOSIS — E66.01 CLASS 3 SEVERE OBESITY DUE TO EXCESS CALORIES WITH SERIOUS COMORBIDITY AND BODY MASS INDEX (BMI) OF 45.0 TO 49.9 IN ADULT: ICD-10-CM

## 2024-07-15 DIAGNOSIS — G43.009 MIGRAINE WITHOUT AURA AND RESPONSIVE TO TREATMENT: ICD-10-CM

## 2024-07-15 DIAGNOSIS — G47.33 OSA ON CPAP: Primary | ICD-10-CM

## 2024-07-15 DIAGNOSIS — G43.011 MIGRAINE WITHOUT AURA, INTRACTABLE, WITH STATUS MIGRAINOSUS: ICD-10-CM

## 2024-07-15 PROCEDURE — 1159F MED LIST DOCD IN RCRD: CPT | Mod: CPTII,95,, | Performed by: NURSE PRACTITIONER

## 2024-07-15 PROCEDURE — 99214 OFFICE O/P EST MOD 30 MIN: CPT | Mod: 95,,, | Performed by: NURSE PRACTITIONER

## 2024-07-15 PROCEDURE — 1160F RVW MEDS BY RX/DR IN RCRD: CPT | Mod: CPTII,95,, | Performed by: NURSE PRACTITIONER

## 2024-07-15 PROCEDURE — 3044F HG A1C LEVEL LT 7.0%: CPT | Mod: CPTII,95,, | Performed by: NURSE PRACTITIONER

## 2024-07-15 RX ORDER — KETOROLAC TROMETHAMINE 10 MG/1
10 TABLET, FILM COATED ORAL EVERY 12 HOURS PRN
Qty: 30 TABLET | Refills: 0 | Status: SHIPPED | OUTPATIENT
Start: 2024-07-15

## 2024-07-15 RX ORDER — GABAPENTIN 300 MG/1
300 CAPSULE ORAL 2 TIMES DAILY
Qty: 60 CAPSULE | Refills: 11 | Status: SHIPPED | OUTPATIENT
Start: 2024-07-15 | End: 2025-07-16

## 2024-07-15 NOTE — PROGRESS NOTES
[facility] Neurology Telemedicine Follow Up Visit Note    This is a real-time audio/video visit that was performed with the originating site at patient's home and the distant site, [facility]. Verbal consent to participate in interactive audio & video visit was obtained.    I discussed with the patient regarding the nature of our telehealth visits, that:    - Our sessions are not being recorded and that personal health information is protected  - Provider would evaluate the patient and recommend diagnostics and treatments based on my assessment  - [facility] will provide follow up care in person if/when the patient needs it.     Subjective:      Patient ID: Abdi Combs is a 44 y.o. female.    Chief Complaint: Migraine (Patient reports migraines have worsened since last visit.)    HPI   This is a 44 year old Right Handed F with PMHX of HTN, Depression, Anxiety, hysterectomy, LAURA on CPAP, pancreatitis was referred for Migraine w/ status migrainosus. She was last seen on 3/4/2024. During that visit, Sumatriptan was discontinued, warren 300 mg nightly, Toradol 10 mg PO BID PRN and Ubrelvy 100mg PO BID PRN were initiated. PSG orders were faxed. On 4/30/2024 Dexamethasone 4mg PO BID x 5 days was called in for migraine.    Today, Pt states migraines have worsened since last visit. Admits to increased stress. Occur once per week but may last 3 days (stop/start). Ubrelvy effective as abortive therapy. Currently taking gabapentin 300mg nightly and also taking ketorolac PRN. Not currently utilizing CPAP machine. States she has called sleep center in [facility] and may need to be retested. Not sleeping well at this time.    Age of Onset - around the time of puberty    Semiology - bifrontal, pounding, 10/10, lasting 1 day, w/ nausea and photophobia; .    Frequency - 12 migraine days per month    Risk Factors -  - Family  history of headache disorder? Mom with headache  - History of Head Trauma? denied  - History of CNS infection? denied  - History of underlying mood disorder? anxiety, depression.  - Illicit drug use? denied  - Tobacco use? denied  - History of sleep disorder? moderate LAURA. Started on CPAP in 9/2020. Sleeping better with CPAP.    Workup -  - MRI Brain: denied  [facility] no acute [facility] abnormality identified    Current ppx meds -  Procardia XL 90mg - not effective for migraine   Venlafaxine XR 150mg BID - not effective for migraine  Gabapentin 300mg PO QHS (3/4/2024 - present) not effective at this dose    Current abortive meds -  Ubrelvy 100mg PO BID PRN (3/4/2024 - present) effective  Ketorolac 10mg PO BID PRN (3/4/2024 - present) effective    Prior ppx meds -  Elavil [id number] - ineffective  TPM 100mg daily (1/2018 - 4/2018) - ineffective  Lexapro (stopped 2/26/2020)  Effexor 150mg daily (5/27/2020 - 6/27/2020) - dizziness  Ajovy once per month (11/16/2020 - 8/16/2021) - ineffective  Lisinopril 10mg daily    Prior abortive meds -   Compazine - dystonia  Fioricet prn - only partially effective. Has rebound headaches afterwards.  Naproxen prn - BID  Sumatriptan 100mg PO Qday PRN    Results for orders placed or performed during the hospital encounter of 04/10/24   Troponin I   Result Value Ref Range    Troponin-I <0.010 0.000 - 0.045 ng/mL   Comprehensive Metabolic Panel   Result Value Ref Range    Sodium 139 136 - 145 mmol/L    Potassium 3.0 (L) 3.5 - 5.1 mmol/L    Chloride 101 98 - 107 mmol/L    CO2 27 22 - 29 mmol/L    Glucose 108 (H) 74 - 100 mg/dL    Blood Urea Nitrogen 13.3 7.0 - 18.7 mg/dL    Creatinine 0.76 0.55 - 1.02 mg/dL    Calcium 9.1 8.4 - 10.2 mg/dL    Protein Total 8.0 6.4 - 8.3 gm/dL    Albumin 3.5 3.5 - 5.0 g/dL    Globulin 4.5 (H) 2.4 - 3.5 gm/dL    Albumin/Globulin Ratio 0.8 (L) 1.1 - 2.0 ratio    Bilirubin Total 0.7 <=1.5 mg/dL    ALP 66 40 - 150 unit/L    ALT 14 0 - 55 unit/L     AST 20 5 - 34 unit/L    eGFR >60 mls/min/1.73/m2   Protime-INR   Result Value Ref Range    PT 11.1 (L) 12.5 - 14.5 seconds    INR 1.1 <=1.3   APTT   Result Value Ref Range    PTT 27.6 23.2 - 33.7 seconds   CBC with Differential   Result Value Ref Range    WBC 10.81 4.50 - 11.50 x10(3)/mcL    RBC 5.35 4.20 - 5.40 x10(6)/mcL    Hgb 14.3 12.0 - 16.0 g/dL    Hct 42.1 37.0 - 47.0 %    MCV 78.7 (L) 80.0 - 94.0 fL    MCH 26.7 (L) 27.0 - 31.0 pg    MCHC 34.0 33.0 - 36.0 g/dL    RDW 15.0 11.5 - 17.0 %    Platelet 320 130 - 400 x10(3)/mcL    MPV 10.5 (H) 7.4 - 10.4 fL    Neut % 82.4 %    Lymph % 9.1 %    Mono % 7.8 %    Eos % 0.1 %    Basophil % 0.2 %    Lymph # 0.98 0.6 - 4.6 x10(3)/mcL    Neut # 8.92 2.1 - 9.2 x10(3)/mcL    Mono # 0.84 0.1 - 1.3 x10(3)/mcL    Eos # 0.01 0 - 0.9 x10(3)/mcL    Baso # 0.02 <=0.2 x10(3)/mcL    IG# 0.04 0 - 0.04 x10(3)/mcL    IG% 0.4 %   Ethanol   Result Value Ref Range    Ethanol Level <10.0 <=10.0 mg/dL   Troponin ISTAT   Result Value Ref Range    POC Cardiac Troponin I 0.00 0.00 - 0.08 ng/mL    Sample unknown    EKG 12-lead   Result Value Ref Range    QRS Duration 86 ms    OHS QTC Calculation 486 ms       Review of Systems    Constitutional: no fever, fatigue, weakness  Eye: no vision loss, eye redness, drainage, or pain  ENMT: no sore throat, ear pain, sinus pain/congestion, nasal congestion/drainage  Respiratory: no cough, no wheezing, no shortness of breath  Cardiovascular: no chest pain, no palpitations, no edema  Gastrointestinal: no nausea, vomiting, or diarrhea. No abdominal pain  Genitourinary: no dysuria, no urinary frequency or urgency, no hematuria  Hema/Lymph: no abnormal bruising or bleeding  Endocrine: no heat or cold intolerance, no excessive thirst or excessive urination  Musculoskeletal: no muscle or joint pain, no joint swelling  Integumentary: no skin rash or abnormal lesion  Psychiatric: no depressed mood, no anxiety, no visual/auditory hallucinations, no delusions, no  suicidal or homicidal ideation  Neurologic: as per HPI    Objective:      Physical Exam    General: well-developed well-nourished in no acute distress  Eye: clear conjunctiva, eyelids normal  HENT: oropharynx without erythema/exudate, oropharynx mucosal surface moist  Neck: full range of motion  Respiratory: no distress on RA  Cardiovascular: unable to assess due to nature of visit   Gastrointestinal: round, no distension, no guarding  Musculoskeletal: full range of motion of all extremities  Integumentary: no visible rashes rashes.    Neurologic:  Mental Status- Alert and Oriented to time, self, place, Speech is fluent, with intact repetition, naming, reading, and comprehension, No Dysarthria.  CN II-XII - No ptosis b/l, EOMI w/o nystagmus, T/U midline, Shoulder shrug symmetric b/l, hearing intact, Face Symmetric.  Motor - tone and bulk appears grossly normal, no abnormal involuntary or voluntary movements, FFM nml b/l, No visible satelliting on orbiting, No pronator drift  Sensory - unable to assess due to nature of visit  Reflexes - unable to assess due to nature of visit  Cerebellar Exam - FNF wnl b/l no intention tremor.  Gait - Normal, good arm swing b/l.    Assessment:       This is a 44 year old Right Handed F with PMHX of HTN, Depression, Anxiety, hysterectomy, LAURA on CPAP (not currently), pancreatitis, with Migraine w/ status migrainosus. Had a history of Allyson Hunt Syndrome in 12/2020. Migraine have worsened to 1 that lasts 3 days. Ubrelvy and ketorolac effective as abortive therapy. Admits to increased stress. Not sleeping well, not utilizing CPAP machine. States machine is broken. Will call sleep center to schedule new sleep study. Taking gabapentin nightly with no improvement in sleep or migraines. HTN currently uncontrolled. Amenable to injectable therapy. This is not a medication overuse HA.    1. Migraine without aura, intractable, with status migrainosus    2. LAURA on CPAP    3. Class 3 severe  obesity due to excess calories with serious comorbidity and body mass index (BMI) of 45.0 to 49.9 in adult  \    Plan:       [] c/w Ubrelvy 100mg PO BID at onset of migraine; triptans contraindicated in uncontrolled HTN  [] increase warren 300mg to BID or 2 caps nightly for migraine and sleep  [] c/w Toradol 10mg PO BID PRN for migraine  [] start Emgality montly with loading dose on first month followed by maintenance dose. To be used as preventative therapy  [] start exercise program 30 min daily x 5 days weekly for overall health and wellness    RTC 3 months -  okay    Headache education provided - including good sleep hygiene, stress management, medication overuse education provided - using more 3 OTC per week may worsen headaches, High intensity interval training has shown to reduce headache frequency. Low carb, high protein has shown to reduce headache frequency. Patient is instructed to keep headache diary.    I have explained the treatment plan, diagnosis, and prognosis to the patient, caretaker, family. All questions are answered to the best of my knowledge.    Face to Face Time 30 minute, including, counseling, education, review of test results, relevant medical records, and coordination of care.     Kayleigh Retana, NP-C  General Neurology

## 2024-07-19 ENCOUNTER — LAB VISIT (OUTPATIENT)
Dept: LAB | Facility: HOSPITAL | Age: 44
End: 2024-07-19
Attending: INTERNAL MEDICINE
Payer: MEDICAID

## 2024-07-19 DIAGNOSIS — K25.9 GASTRIC ULCER DUE TO HELICOBACTER PYLORI, UNSPECIFIED CHRONICITY: Primary | ICD-10-CM

## 2024-07-19 DIAGNOSIS — B96.81 GASTRIC ULCER DUE TO HELICOBACTER PYLORI, UNSPECIFIED CHRONICITY: Primary | ICD-10-CM

## 2024-07-19 PROCEDURE — 87338 HPYLORI STOOL AG IA: CPT

## 2024-07-22 LAB — H. PYLORI STOOL: POSITIVE

## 2024-07-25 ENCOUNTER — OFFICE VISIT (OUTPATIENT)
Dept: GYNECOLOGY | Facility: CLINIC | Age: 44
End: 2024-07-25
Payer: MEDICAID

## 2024-07-25 ENCOUNTER — LAB VISIT (OUTPATIENT)
Dept: LAB | Facility: HOSPITAL | Age: 44
End: 2024-07-25
Attending: NURSE PRACTITIONER
Payer: MEDICAID

## 2024-07-25 VITALS
DIASTOLIC BLOOD PRESSURE: 82 MMHG | SYSTOLIC BLOOD PRESSURE: 130 MMHG | RESPIRATION RATE: 20 BRPM | HEART RATE: 92 BPM | WEIGHT: 293 LBS | HEIGHT: 67 IN | TEMPERATURE: 99 F | OXYGEN SATURATION: 100 % | BODY MASS INDEX: 45.99 KG/M2

## 2024-07-25 DIAGNOSIS — Z11.3 ROUTINE SCREENING FOR STI (SEXUALLY TRANSMITTED INFECTION): ICD-10-CM

## 2024-07-25 DIAGNOSIS — Z01.419 ENCOUNTER FOR ANNUAL ROUTINE GYNECOLOGICAL EXAMINATION: Primary | ICD-10-CM

## 2024-07-25 DIAGNOSIS — Z12.31 VISIT FOR SCREENING MAMMOGRAM: ICD-10-CM

## 2024-07-25 LAB
C TRACH DNA SPEC QL NAA+PROBE: NOT DETECTED
CLUE CELLS VAG QL WET PREP: ABNORMAL
HBV SURFACE AG SERPL QL IA: NONREACTIVE
HCV AB SERPL QL IA: NONREACTIVE
HIV 1+2 AB+HIV1 P24 AG SERPL QL IA: NONREACTIVE
N GONORRHOEA DNA SPEC QL NAA+PROBE: NOT DETECTED
SOURCE (OHS): NORMAL
T PALLIDUM AB SER QL: NONREACTIVE
T VAGINALIS VAG QL WET PREP: ABNORMAL
WBC #/AREA VAG WET PREP: ABNORMAL
YEAST SPEC QL WET PREP: ABNORMAL

## 2024-07-25 PROCEDURE — 3079F DIAST BP 80-89 MM HG: CPT | Mod: CPTII,,, | Performed by: NURSE PRACTITIONER

## 2024-07-25 PROCEDURE — 86780 TREPONEMA PALLIDUM: CPT

## 2024-07-25 PROCEDURE — 3044F HG A1C LEVEL LT 7.0%: CPT | Mod: CPTII,,, | Performed by: NURSE PRACTITIONER

## 2024-07-25 PROCEDURE — 87340 HEPATITIS B SURFACE AG IA: CPT

## 2024-07-25 PROCEDURE — 87210 SMEAR WET MOUNT SALINE/INK: CPT | Performed by: NURSE PRACTITIONER

## 2024-07-25 PROCEDURE — 1159F MED LIST DOCD IN RCRD: CPT | Mod: CPTII,,, | Performed by: NURSE PRACTITIONER

## 2024-07-25 PROCEDURE — 86803 HEPATITIS C AB TEST: CPT

## 2024-07-25 PROCEDURE — 87491 CHLMYD TRACH DNA AMP PROBE: CPT | Performed by: NURSE PRACTITIONER

## 2024-07-25 PROCEDURE — 3075F SYST BP GE 130 - 139MM HG: CPT | Mod: CPTII,,, | Performed by: NURSE PRACTITIONER

## 2024-07-25 PROCEDURE — 99396 PREV VISIT EST AGE 40-64: CPT | Mod: S$PBB,,, | Performed by: NURSE PRACTITIONER

## 2024-07-25 PROCEDURE — 87591 N.GONORRHOEAE DNA AMP PROB: CPT | Performed by: NURSE PRACTITIONER

## 2024-07-25 PROCEDURE — 3008F BODY MASS INDEX DOCD: CPT | Mod: CPTII,,, | Performed by: NURSE PRACTITIONER

## 2024-07-25 PROCEDURE — 36415 COLL VENOUS BLD VENIPUNCTURE: CPT

## 2024-07-25 PROCEDURE — 99214 OFFICE O/P EST MOD 30 MIN: CPT | Mod: PBBFAC | Performed by: NURSE PRACTITIONER

## 2024-07-25 PROCEDURE — 87389 HIV-1 AG W/HIV-1&-2 AB AG IA: CPT

## 2024-07-25 NOTE — PROGRESS NOTES
"  [facility] -  Gynecology / Women's Health Clinic    Subjective:       Patient ID: Abdi Combs is a 44 y.o. female.    Chief Complaint:  Gynecologic Exam    History of Present Illness  The patient  here for annual exam. Pt is s/p TVH/BS/Cysto on 10/19/21 for AUB-P/L. Denies history of abnormal paps. MG-22 & BIRADS 1. Denies breast or urinary complaints. Denies pelvic pain, abnormal bleeding or discharge. Pt reports trichomonas in the past and desires testing. Pt does admit to hot flashes that occur since hysterectomy, used estradiol patch in the past. Declines further treatment at this time. Denies tobacco use. Followed by Dr. Melgoza for behavioral health. IM provider for primary care.Denies fly hx of breast, ovarian, uterine or colon cancer.      GYN & OB History  No LMP recorded. Patient has had a hysterectomy.       Review of patient's allergies indicates:  No Known Allergies  Past Medical History:   Diagnosis Date    Anxiety     Depression     Headache     Hypertension      OB History    Para Term  AB Living   4 2     2     SAB IAB Ectopic Multiple Live Births   2              # Outcome Date GA Lbr Devan/2nd Weight Sex Type Anes PTL Lv   4 Para            3 Para            2 SAB            1 SAB                 Review of Systems  Review of Systems    Negative except for pertinent findings for positives per HPI     Objective:    Physical Exam    /82 (BP Location: Left arm, Patient Position: Sitting, BP Method: Large (Automatic))   Pulse 92   Temp 98.6 °F (37 °C) (Oral)   Resp 20   Ht 5' 7" (1.702 m)   Wt 133.6 kg (294 lb 9.6 oz)   SpO2 100%   BMI 46.14 kg/m²   GENERAL: Well-developed female in no acute distress.  SKIN: Normal to inspection,warm, dry and intact.  BREASTS: No rashes or erythema. No masses, lumps, discharge, tenderness.  VULVA: General appearance WNL; external genitalia with no lesions or erythema.  BIMANUAL EXAM: Vaginal " mucosa/vault atrophic Vaginal cuff intact. Uterus/Cervix surgically absent. Daryn adnexa reveal no tenderness.  PSYCHIATRIC: Patient is oriented to person, place, and time. Mood and affect are normal.    Assessment:         ICD-10-CM ICD-9-CM   1. Encounter for annual routine gynecological examination  Z01.419 V72.31   2. Visit for screening mammogram  Z12.31 V76.12   3. Routine screening for STI (sexually transmitted infection)  Z11.3 V74.5     Plan:   Abdi was seen today for gynecologic exam.    Diagnoses and all orders for this visit:    Encounter for annual routine gynecological examination    Visit for screening mammogram  -     Mammo Digital Screening Bilat w/ Kaden; Future    Routine screening for STI (sexually transmitted infection)  -     Chlamydia/GC, PCR  -     Wet Prep, Genital  -     HIV 1/2 Ag/Ab (4th Gen); Future  -     Hepatitis C Antibody; Future  -     Hepatitis B Surface Antigen; Future  -     SYPHILIS ANTIBODY (WITH REFLEX RPR); Future    Pelvic today, pap deferred d/t hysterectomy  MG ordered  STI screen  Encouraged well balanced diet and exercise 3-4x per week; use of handheld and fan at bedside, keep home cooled. Ice packs. Encouraged wearing layers to remove as needed, light colored clothes and linen fabrics. Avoid spicy foods and excessive caffeine use. Maintain healthy body weight. Quit smoking.  Exercise, yoga, meditation, paced respirations are all good coping techniques.   Follow up in about 1 year (around 7/25/2025) for annual exam.

## 2024-08-27 ENCOUNTER — PATIENT MESSAGE (OUTPATIENT)
Dept: GASTROENTEROLOGY | Facility: CLINIC | Age: 44
End: 2024-08-27

## 2024-08-27 NOTE — TELEPHONE ENCOUNTER
She will need to be seen in clinic in a 45 minute slot please. She can be placed on cancellation list. Thanks

## 2024-09-06 RX ORDER — QUETIAPINE FUMARATE 300 MG/1
300 TABLET, FILM COATED ORAL NIGHTLY
COMMUNITY
Start: 2024-08-06

## 2024-09-10 ENCOUNTER — OFFICE VISIT (OUTPATIENT)
Dept: INTERNAL MEDICINE | Facility: CLINIC | Age: 44
End: 2024-09-10
Payer: MEDICAID

## 2024-09-10 VITALS
SYSTOLIC BLOOD PRESSURE: 121 MMHG | WEIGHT: 293 LBS | TEMPERATURE: 98 F | HEART RATE: 86 BPM | OXYGEN SATURATION: 99 % | RESPIRATION RATE: 20 BRPM | DIASTOLIC BLOOD PRESSURE: 85 MMHG | BODY MASS INDEX: 46.48 KG/M2

## 2024-09-10 DIAGNOSIS — I10 PRIMARY HYPERTENSION: Primary | ICD-10-CM

## 2024-09-10 PROCEDURE — 99214 OFFICE O/P EST MOD 30 MIN: CPT | Mod: PBBFAC

## 2024-09-10 RX ORDER — NALTREXONE HYDROCHLORIDE 50 MG/1
50 TABLET, FILM COATED ORAL DAILY
Qty: 30 TABLET | Refills: 5 | Status: SHIPPED | OUTPATIENT
Start: 2024-09-10

## 2024-09-10 RX ORDER — NIFEDIPINE 60 MG/1
60 TABLET, EXTENDED RELEASE ORAL DAILY
Qty: 90 TABLET | Refills: 1 | Status: SHIPPED | OUTPATIENT
Start: 2024-09-10 | End: 2025-03-09

## 2024-09-10 RX ORDER — BUPROPION HYDROCHLORIDE 150 MG/1
150 TABLET, EXTENDED RELEASE ORAL 2 TIMES DAILY
Qty: 180 TABLET | Refills: 3 | Status: SHIPPED | OUTPATIENT
Start: 2024-09-10 | End: 2025-09-10

## 2024-09-10 NOTE — PROGRESS NOTES
Patient Name: Abdi Combs  MRN: [id number]              [facility]    Subjective:        Ms. Abdi Combs is a 42 y.o. female w/ PMH hypertension, GERD, anxiety/depression, allergic rhinitis. s/p FESS 8/11/17 who comes to clinic today for follow up.  Patient has been having nonspecific abdominal pain s/p 1-2 hours after the.  Patient says this pain is chronic and last.  Patient was hospitalized in May and was found to have edematous pancreatitis patient underwent MRCP with no underlying blockage.  Patient's triglyceride and does not have any alcohol use, patient's pancreatitis was then attributed to lisinopril even otherwise noted chronic changes noted.      7/2022 Patient has been miserable she says she is scared of eating, she says that she has only been eating bananas because she says that it is good for your stomach and causes less pain.  Patient is still having pain.  Patient also says that she also been having some migraines which have been intermittent, she says she does not have any migraines at this time and she has an appointment later this month when Neurology.     10/2022:  Patient says that her chest pain is much improved, but she does have some chest pain that radiates to her back similar to the  pain when she had her pancreatitis.  Patient says that this is seldomly, approximately 1-2 times per month.  These episodes only last approximately 15 minutes.  Patient says that she is able to eat without any issues, except for things that or spicy.  Patient's migraines are much improved s/p treatment.     04/2023:   -patient is having issues with anxiety, says that she is unable to sleep.  She is taking Klonopin for her anxiety.  She says that she had been taking some antidepressant in the past however her doctor has put her on Ativan and now currently on Klonopin.  Her main complaint today is knee pain which she says has gotten worse, she has continued to gain weight and  currently has BMI 47.55 she is not heard back from the surgeons for possible knee replacement.  She has not done physical therapy at all.    11/2023  Anxiety and depression symptoms are uncontrolled and worse at this time. Patient says that she lost her family member who was shot, she feels guilty because that she was in a meeting, and unable to see him. Denies any SI. Sleep is an issue. Patient would like contrave.     2/2024  More bills than work. Sleep is 3-4 hours, trouble going to sleep. Anhedonia. Guilt, wants more than she has. Energy issues. Concentration. No si/hi. Appetite over eating. Patient would like to continue contrave. She has severe MDD, and usually just stays in the dark without doing anything.     9/2024  Patient continuing to complain about anxiety today. Says she found support group at [facility] but it is conflicting with her work times now. States she had about 17 lb weight loss with naltrexone and buproprion and would like to continue with it. Continuing to follow with neurology for migraines. Is using CPAP and compliant with it. States she needs a new mask and has been having trouble with getting one from [place].      Review of Systems:  The remainder of the 14 system ROS is noncontributory or negative unless mentioned/reviewed above.    Objective:     Audio:  No objective data        Laboratory:  Lab Results   Component Value Date    WBC 8.2 02/02/2023    HGB 13.3 02/02/2023    HCT 41.0 02/02/2023     02/02/2023    MCV 75.6 (L) 02/02/2023    RDW 15.5 02/02/2023    IRON 41 (L) 02/02/2023    TIBC 241 (L) 02/02/2023    FERRITIN 48.50 02/02/2023     Lab Results   Component Value Date    HGBA1C 5.4 05/22/2022    .8 09/08/2021    CREATININE 0.71 02/02/2023    Lab Results   Component Value Date     02/02/2023    K 3.5 02/02/2023    CHLORIDE 102 02/02/2023    CO2 28 02/02/2023    BUN 8.5 02/02/2023    CREATININE 0.71 02/02/2023    CALCIUM 9.5 02/02/2023    MG 1.90  "02/02/2023    PHOS 4.4 02/02/2023     Lab Results   Component Value Date    TSH 1.453 02/02/2023    [id number] 1.06 02/02/2023            Current Medications:  Current Outpatient Medications   Medication Instructions    buPROPion (WELLBUTRIN SR) 150 mg, Oral, 2 times daily    cholecalciferol (vitamin D3) (VITAMIN D3) 1,000 Units, Oral, Daily    clonazePAM (KLONOPIN) 2 mg, Oral, Daily    ferrous sulfate 324 mg, Oral, Every other day    fluticasone propionate (FLONASE) 50 mcg/actuation nasal spray 1 spray, Nasal, Daily PRN    gabapentin (NEURONTIN) 300 mg, Oral, Nightly    hydroCHLOROthiazide (HYDRODIURIL) 25 mg, Oral, Daily    ketorolac (TORADOL) 10 mg, Oral, 2 times daily    NIFEdipine (PROCARDIA-XL) 90 mg, Oral, Daily    pantoprazole (PROTONIX) 40 mg, Oral, Daily    sumatriptan (IMITREX) 100 mg, Oral, Every 12 hours PRN    venlafaxine (EFFEXOR-XR) 150 mg, Oral, 2 times daily          2D ECHO Results  No results found for this or any previous visit.      Pulmonary Functions Testing Results:    No results found for: "FEV1", "FVC", "RHO7EIN", "TLC", "DLCO"  Assessment and Plan:        Health Maintenance Due   Topic Date Due    Colorectal Cancer Screening  Never done    Mammogram  07/29/2023    Influenza Vaccine (1) 09/01/2023    COVID-19 Vaccine (3 - 2023-24 season) 09/01/2023      Hypertension:  -well controlled   - continue Procardia and chlorthalidone    Allergies:  -cetirizine     MDD:  -Wellbutrin 150 bid, Trazodone 50 qhs for sleep    Morbid obesity:  -contrave     Migraines:  -continue Ubrelvy 100 mg po bid   -gabapentin 300 mg BID for migraine and sleep   -start emgality monthly     LAURA with CPAP:  -sleep study in [facility]   -reports CPAP compliance and benefit from using   - needs help with supplies, given fam's number today     Lyla Matt MD  Internal Medicine PGY-II          "

## 2024-09-19 ENCOUNTER — TELEPHONE (OUTPATIENT)
Dept: INTERNAL MEDICINE | Facility: CLINIC | Age: 44
End: 2024-09-19
Payer: MEDICAID

## 2024-09-19 NOTE — TELEPHONE ENCOUNTER
PHARMACY MESSAGE:   NEED FOR CLARIFICATION    DX CODE NEEDED FOR NALTREXONE 50 MG.  DX CODE  Z68.42 and E66.01 DO NOT WORK WITH MEDICAID.     PLEASE CALL PHARMACY WITH AN ACCEPTABLE DX CODE.

## 2024-09-24 ENCOUNTER — TELEPHONE (OUTPATIENT)
Dept: INTERNAL MEDICINE | Facility: CLINIC | Age: 44
End: 2024-09-24
Payer: MEDICAID

## 2024-10-15 ENCOUNTER — PATIENT MESSAGE (OUTPATIENT)
Dept: NEUROLOGY | Facility: CLINIC | Age: 44
End: 2024-10-15

## 2024-10-15 ENCOUNTER — OFFICE VISIT (OUTPATIENT)
Dept: NEUROLOGY | Facility: CLINIC | Age: 44
End: 2024-10-15
Payer: MEDICAID

## 2024-10-15 DIAGNOSIS — G43.011 MIGRAINE WITHOUT AURA, INTRACTABLE, WITH STATUS MIGRAINOSUS: Primary | ICD-10-CM

## 2024-10-15 DIAGNOSIS — G47.33 OSA ON CPAP: ICD-10-CM

## 2024-10-15 DIAGNOSIS — G47.00 INSOMNIA, UNSPECIFIED TYPE: ICD-10-CM

## 2024-10-15 DIAGNOSIS — E66.813 CLASS 3 SEVERE OBESITY DUE TO EXCESS CALORIES WITH SERIOUS COMORBIDITY AND BODY MASS INDEX (BMI) OF 45.0 TO 49.9 IN ADULT: ICD-10-CM

## 2024-10-15 DIAGNOSIS — F43.9 STRESS AT HOME: ICD-10-CM

## 2024-10-15 DIAGNOSIS — G43.009 MIGRAINE WITHOUT AURA AND RESPONSIVE TO TREATMENT: ICD-10-CM

## 2024-10-15 DIAGNOSIS — E66.01 CLASS 3 SEVERE OBESITY DUE TO EXCESS CALORIES WITH SERIOUS COMORBIDITY AND BODY MASS INDEX (BMI) OF 45.0 TO 49.9 IN ADULT: ICD-10-CM

## 2024-10-15 PROCEDURE — 99214 OFFICE O/P EST MOD 30 MIN: CPT | Mod: 95,,, | Performed by: NURSE PRACTITIONER

## 2024-10-15 PROCEDURE — 1160F RVW MEDS BY RX/DR IN RCRD: CPT | Mod: CPTII,95,, | Performed by: NURSE PRACTITIONER

## 2024-10-15 PROCEDURE — 1159F MED LIST DOCD IN RCRD: CPT | Mod: CPTII,95,, | Performed by: NURSE PRACTITIONER

## 2024-10-15 PROCEDURE — 3044F HG A1C LEVEL LT 7.0%: CPT | Mod: CPTII,95,, | Performed by: NURSE PRACTITIONER

## 2024-10-15 NOTE — PROGRESS NOTES
[facility] Neurology Telemedicine Follow Up Visit Note    This is a real-time audio/video visit that was performed with the originating site at patient's home and the distant site, [facility]. Verbal consent to participate in interactive audio & video visit was obtained.    I discussed with the patient regarding the nature of our telehealth visits, that:    - Our sessions are not being recorded and that personal health information is protected  - Provider would evaluate the patient and recommend diagnostics and treatments based on my assessment  - [facility] will provide follow up care in person if/when the patient needs it.     Subjective:      Patient ID: Abdi Combs is a 44 y.o. female.    Chief Complaint: Migraine (Took meds last night for migraine, they were getting better, unable to check BP at home, con't physical therapy)    HPI   This is a 44 year old Right Handed F with PMHX of HTN, Depression, Anxiety, hysterectomy, LAURA on CPAP, pancreatitis was referred for Migraine w/ status migrainosus. She was last seen on 7/15/2024. During that visit, warren 300 mg was increased to BID, Ubrelvy 100mg PO BID PRN was continued. Emgality monthly was initiated.     Today, Pt states she did not receive Emgality (approved through 2025) so did not start. Ubrelvy continues to be effective as abortive therapy. Currently averages 2-3 migraines per week in the setting of stress.  Taking gabapentin 300mg BID. Not currently utilizing CPAP machine as she states she needs a mask. Not sleeping well at this time.    Age of Onset - around the time of puberty    Semiology - bifrontal, pounding, 10/10, lasting 1 day, w/ nausea and photophobia; .    Frequency - 12 migraine days per month    Risk Factors -  - Family history of headache disorder? Mom with headache  - History of Head Trauma? denied  - History of CNS infection? denied  - History of  underlying mood disorder? anxiety, depression.  - Illicit drug use? denied  - Tobacco use? denied  - History of sleep disorder? moderate LAURA. Started on CPAP in 9/2020. Sleeping better with CPAP.    Workup -  - MRI Brain: denied  - [facility]: 6/8/2021 - no acute intracranial abnormality identified    Current ppx meds -  Procardia XL 90mg - not effective for migraine   Venlafaxine XR 150mg BID - not effective for migraine  Gabapentin 300mg PO QHS (3/4/2024 - present) not effective at this dose  Wellbutrin SR 150mg PO BID (9/17/2024 - present)    Current abortive meds -  Ubrelvy 100mg PO BID PRN (3/4/2024 - present) effective  Ketorolac 10mg PO BID PRN (3/4/2024 - present) effective    Prior ppx meds -  Elavil [id number] - ineffective  TPM 100mg daily (1/2018 - 4/2018) - ineffective  Lexapro (stopped 2/26/2020)  Effexor 150mg daily (5/27/2020 - 6/27/2020) - dizziness  Ajovy once per month (11/16/2020 - 8/16/2021) - ineffective  Lisinopril 10mg daily    Prior abortive meds -   Compazine - dystonia  Fioricet prn - only partially effective. Has rebound headaches afterwards.  Naproxen prn - BID  Sumatriptan 100mg PO Qday PRN    Results for orders placed or performed in visit on 07/25/24   HIV 1/2 Ag/Ab (4th Gen)    Collection Time: 07/25/24  3:00 PM   Result Value Ref Range    HIV Nonreactive Nonreactive   Hepatitis C Antibody    Collection Time: 07/25/24  3:00 PM   Result Value Ref Range    Hep C Ab Interp Nonreactive Nonreactive   Hepatitis B Surface Antigen    Collection Time: 07/25/24  3:00 PM   Result Value Ref Range    Hep BsAg Interp Nonreactive Nonreactive   SYPHILIS ANTIBODY (WITH REFLEX RPR)    Collection Time: 07/25/24  3:00 PM   Result Value Ref Range    Syphilis Antibody Nonreactive Nonreactive, Equivocal       Review of Systems    Constitutional: no fever, fatigue, weakness  Eye: no vision loss, eye redness, drainage, or pain  ENMT: no sore throat, ear pain, sinus pain/congestion, nasal  congestion/drainage  Respiratory: no cough, no wheezing, no shortness of breath  Cardiovascular: no chest pain, no palpitations, no edema  Gastrointestinal: no nausea, vomiting, or diarrhea. No abdominal pain  Genitourinary: no dysuria, no urinary frequency or urgency, no hematuria  Hema/Lymph: no abnormal bruising or bleeding  Endocrine: no heat or cold intolerance, no excessive thirst or excessive urination  Musculoskeletal: no muscle or joint pain, no joint swelling  Integumentary: no skin rash or abnormal lesion  Psychiatric: no depressed mood, no anxiety, no visual/auditory hallucinations, no delusions, no suicidal or homicidal ideation  Neurologic: as per HPI    Objective:      Physical Exam    General: well-developed well-nourished in no acute distress  Eye: clear conjunctiva, eyelids normal  HENT: oropharynx without erythema/exudate, oropharynx mucosal surface moist  Neck: full range of motion  Respiratory: no distress on RA  Cardiovascular: unable to assess due to nature of visit   Gastrointestinal: round, no distension, no guarding  Musculoskeletal: full range of motion of all extremities  Integumentary: no visible rashes rashes.    Neurologic:  Mental Status- Alert and Oriented to time, self, place, Speech is fluent, with intact repetition, naming, reading, and comprehension, No Dysarthria.  CN II-XII - No ptosis b/l, EOMI w/o nystagmus, T/U midline, Shoulder shrug symmetric b/l, hearing intact, Face Symmetric.  Motor - tone and bulk appears grossly normal, no abnormal involuntary or voluntary movements, FFM nml b/l, No visible satelliting on orbiting, No pronator drift  Sensory - unable to assess due to nature of visit  Reflexes - unable to assess due to nature of visit  Cerebellar Exam - FNF wnl b/l no intention tremor.  Gait - Normal, good arm swing b/l.    Assessment:       This is a 44 year old Right Handed F with PMHX of HTN, Depression, Anxiety, hysterectomy, LAURA on CPAP (not currently),  pancreatitis, with Migraine w/ status migrainosus. Had a history of [place] Syndrome in 12/2020. Migraine have worsened to 1 that lasts 3 days. Ubrelvy and ketorolac effective as abortive therapy. Admits to increased stress. Not sleeping well, not utilizing CPAP machine. States machine is broken. Will call sleep center to schedule new sleep study. Taking gabapentin nightly with no improvement in sleep or migraines. HTN currently uncontrolled. Amenable to injectable therapy. This is not a medication overuse HA.    1. Migraine without aura, intractable, with status migrainosus  - galcanezumab-gnlm 120 mg/mL PnIj; Inject 2 mLs (240 mg total) into the skin once. 240 mg loading dose (administered as two consecutive injections of 120 mg each) for 1 dose  Dispense: 2 each; Refill: 0  - galcanezumab-gnlm 120 mg/mL PnIj; Inject 1 mL (120 mg total) into the skin every 28 days. maintenance dose  Dispense: 1 each; Refill: 11  - ubrogepant (UBRELVY) 100 mg tablet; Take 1 tablet (100 mg total) by mouth 2 (two) times a day. If symptoms persist or return, may repeat dose after 2 hours. Maximum: 200 mg per 24 hours  Dispense: 16 tablet; Refill: 2    2. Class 3 severe obesity due to excess calories with serious comorbidity and body mass index (BMI) of 45.0 to 49.9 in adult    3. LAURA on CPAP    4. Insomnia, unspecified type    5. Migraine without aura and responsive to treatment    6. Stress at home      Plan:       [] c/w Ubrelvy 100mg PO BID at onset of migraine; triptans contraindicated in uncontrolled HTN  [] c/w warren 300mg to BID or 2 caps nightly for migraine and sleep  [] start Emgality montly with loading dose on first month followed by maintenance dose. To be used as preventative therapy  [] start exercise program 30 min daily x 5 days weekly for overall health and wellness  [] handout on stress    RTC 4 months - TM okay    Headache education provided - including good sleep hygiene, stress management, medication overuse  education provided - using more 3 OTC per week may worsen headaches, High intensity interval training has shown to reduce headache frequency. Low carb, high protein has shown to reduce headache frequency. Patient is instructed to keep headache diary.    I have explained the treatment plan, diagnosis, and prognosis to the patient, caretaker, family. All questions are answered to the best of my knowledge.    Face to Face Time 30 minute, including, counseling, education, review of test results, relevant medical records, and coordination of care.     Kayleigh Retana, NP-C  General Neurology

## 2024-10-22 ENCOUNTER — PATIENT MESSAGE (OUTPATIENT)
Dept: INTERNAL MEDICINE | Facility: CLINIC | Age: 44
End: 2024-10-22
Payer: MEDICAID

## 2024-10-23 DIAGNOSIS — E87.6 HYPOKALEMIA: Primary | ICD-10-CM

## 2024-10-23 RX ORDER — CHOLECALCIFEROL (VITAMIN D3) 25 MCG
1000 TABLET ORAL DAILY
Qty: 90 TABLET | Refills: 1 | Status: SHIPPED | OUTPATIENT
Start: 2024-10-23 | End: 2025-04-21

## 2024-10-23 RX ORDER — POTASSIUM CHLORIDE 20 MEQ/1
20 TABLET, EXTENDED RELEASE ORAL 2 TIMES DAILY
Qty: 10 TABLET | Refills: 0 | Status: SHIPPED | OUTPATIENT
Start: 2024-10-23 | End: 2024-10-28

## 2024-10-23 NOTE — PROGRESS NOTES
Called patient to provide instructions on lab results.    K+ 3.0 on most recent lab, patient is stable. It appears that patient has intermittent hypokalemia since 2021. She is stable at the time of phone call. Will give her KCL 20 mEq PO BID x5 days. Instructed her to repeat BMP and magnesium level after completing KCL supplement.  Chlorthalidone does carry a risk for hypokalemia. Per chart review, she was taking the medication from April to August 2023 and February 2024 to present. Perhaps she may need to be switched to a different antihypertensive.   She had concern with her vitamin D level. Her last vitamin D level was 26 in 2023. Will give her vitamin D3 1000 unit daily.   She also had concern regarding her CBC; it was communicated to the patient that these findings can be investigated at the next clinic follow-up appointment.     Patient verbalized understanding.     Elisabeth Holder DO  [facility] Internal Medicine PGY-III

## 2024-10-25 DIAGNOSIS — G43.011 MIGRAINE WITHOUT AURA, INTRACTABLE, WITH STATUS MIGRAINOSUS: ICD-10-CM

## 2024-11-06 ENCOUNTER — HOSPITAL ENCOUNTER (OUTPATIENT)
Dept: RADIOLOGY | Facility: HOSPITAL | Age: 44
Discharge: HOME | End: 2024-11-06
Attending: NURSE PRACTITIONER
Payer: MEDICAID

## 2024-11-06 DIAGNOSIS — Z12.31 VISIT FOR SCREENING MAMMOGRAM: ICD-10-CM

## 2024-11-06 PROCEDURE — 77067 SCR MAMMO BI INCL CAD: CPT | Mod: TC

## 2024-11-06 PROCEDURE — 77063 BREAST TOMOSYNTHESIS BI: CPT | Mod: 26,,, | Performed by: STUDENT IN AN ORGANIZED HEALTH CARE EDUCATION/TRAINING PROGRAM

## 2024-11-06 PROCEDURE — 77067 SCR MAMMO BI INCL CAD: CPT | Mod: 26,,, | Performed by: STUDENT IN AN ORGANIZED HEALTH CARE EDUCATION/TRAINING PROGRAM

## 2024-11-20 ENCOUNTER — LAB VISIT (OUTPATIENT)
Dept: LAB | Facility: HOSPITAL | Age: 44
End: 2024-11-20
Attending: INTERNAL MEDICINE
Payer: MEDICAID

## 2024-11-20 DIAGNOSIS — K25.9 GASTRIC ULCER DUE TO HELICOBACTER PYLORI, UNSPECIFIED CHRONICITY: Primary | ICD-10-CM

## 2024-11-20 DIAGNOSIS — I10 PRIMARY HYPERTENSION: ICD-10-CM

## 2024-11-20 DIAGNOSIS — B96.81 GASTRIC ULCER DUE TO HELICOBACTER PYLORI, UNSPECIFIED CHRONICITY: Primary | ICD-10-CM

## 2024-11-20 DIAGNOSIS — E87.6 HYPOKALEMIA: ICD-10-CM

## 2024-11-20 LAB
ALBUMIN SERPL-MCNC: 3.8 G/DL (ref 3.5–5)
ALBUMIN/GLOB SERPL: 0.8 RATIO (ref 1.1–2)
ALP SERPL-CCNC: 83 UNIT/L (ref 40–150)
ALT SERPL-CCNC: 16 UNIT/L (ref 0–55)
ANION GAP SERPL CALC-SCNC: 9 MEQ/L
AST SERPL-CCNC: 17 UNIT/L (ref 5–34)
BASOPHILS # BLD AUTO: 0.03 X10(3)/MCL
BASOPHILS NFR BLD AUTO: 0.3 %
BILIRUB SERPL-MCNC: 0.5 MG/DL
BUN SERPL-MCNC: 12.2 MG/DL (ref 7–18.7)
CALCIUM SERPL-MCNC: 9 MG/DL (ref 8.4–10.2)
CHLORIDE SERPL-SCNC: 101 MMOL/L (ref 98–107)
CO2 SERPL-SCNC: 30 MMOL/L (ref 22–29)
CREAT SERPL-MCNC: 0.78 MG/DL (ref 0.55–1.02)
CREAT/UREA NIT SERPL: 16
EOSINOPHIL # BLD AUTO: 0.06 X10(3)/MCL (ref 0–0.9)
EOSINOPHIL NFR BLD AUTO: 0.6 %
ERYTHROCYTE [DISTWIDTH] IN BLOOD BY AUTOMATED COUNT: 14.9 % (ref 11.5–17)
EST. AVERAGE GLUCOSE BLD GHB EST-MCNC: 114 MG/DL
GFR SERPLBLD CREATININE-BSD FMLA CKD-EPI: >60 ML/MIN/1.73/M2
GLOBULIN SER-MCNC: 4.6 GM/DL (ref 2.4–3.5)
GLUCOSE SERPL-MCNC: 97 MG/DL (ref 74–100)
H. PYLORI STOOL: NEGATIVE
HBA1C MFR BLD: 5.6 %
HCT VFR BLD AUTO: 40.3 % (ref 37–47)
HGB BLD-MCNC: 13.7 G/DL (ref 12–16)
IMM GRANULOCYTES # BLD AUTO: 0.01 X10(3)/MCL (ref 0–0.04)
IMM GRANULOCYTES NFR BLD AUTO: 0.1 %
LYMPHOCYTES # BLD AUTO: 1.8 X10(3)/MCL (ref 0.6–4.6)
LYMPHOCYTES NFR BLD AUTO: 19.5 %
MAGNESIUM SERPL-MCNC: 1.8 MG/DL (ref 1.6–2.6)
MCH RBC QN AUTO: 27 PG (ref 27–31)
MCHC RBC AUTO-ENTMCNC: 34 G/DL (ref 33–36)
MCV RBC AUTO: 79.5 FL (ref 80–94)
MONOCYTES # BLD AUTO: 0.69 X10(3)/MCL (ref 0.1–1.3)
MONOCYTES NFR BLD AUTO: 7.5 %
NEUTROPHILS # BLD AUTO: 6.66 X10(3)/MCL (ref 2.1–9.2)
NEUTROPHILS NFR BLD AUTO: 72 %
PLATELET # BLD AUTO: 330 X10(3)/MCL (ref 130–400)
PMV BLD AUTO: 9.8 FL (ref 7.4–10.4)
POTASSIUM SERPL-SCNC: 3.3 MMOL/L (ref 3.5–5.1)
PROT SERPL-MCNC: 8.4 GM/DL (ref 6.4–8.3)
RBC # BLD AUTO: 5.07 X10(6)/MCL (ref 4.2–5.4)
SODIUM SERPL-SCNC: 140 MMOL/L (ref 136–145)
WBC # BLD AUTO: 9.25 X10(3)/MCL (ref 4.5–11.5)

## 2024-11-20 PROCEDURE — 85025 COMPLETE CBC W/AUTO DIFF WBC: CPT

## 2024-11-20 PROCEDURE — 36415 COLL VENOUS BLD VENIPUNCTURE: CPT

## 2024-11-20 PROCEDURE — 80053 COMPREHEN METABOLIC PANEL: CPT

## 2024-11-20 PROCEDURE — 83735 ASSAY OF MAGNESIUM: CPT

## 2024-11-20 PROCEDURE — 87338 HPYLORI STOOL AG IA: CPT | Performed by: INTERNAL MEDICINE

## 2024-11-20 PROCEDURE — 83036 HEMOGLOBIN GLYCOSYLATED A1C: CPT

## 2024-12-30 ENCOUNTER — TELEPHONE (OUTPATIENT)
Dept: INTERNAL MEDICINE | Facility: CLINIC | Age: 44
End: 2024-12-30
Payer: MEDICAID

## 2024-12-30 NOTE — TELEPHONE ENCOUNTER
DR. GASPAR,      PHARMACY NOTE TO PRESCRIBER:  NEED NEW DX CODE.  Z68.42 AND E66.01 ARE NOT VALID FOR [facility].      PLEASE PROVIDE A VALID DX CODE.  USUALLY IF IT IS [place] WEIGHT LOSS IT IS NOT APPROVED.         PLEASE ADVISE.

## 2025-01-03 NOTE — TELEPHONE ENCOUNTER
Called and spoke to patient that naltrexone will have to be out of pocket. There is no diangosis code that is working just for weight loss that will be covered by insurance. Patient states she will pay for it out of pocket because she feels significant improvement on medication. Reviewed labs as well and advised to take daily vitamin d supplement. Will watch potassium closely. No further questions at this time.    Lyla Matt MD   [facility] Internal Medicine PGY2

## 2025-01-13 ENCOUNTER — PATIENT MESSAGE (OUTPATIENT)
Dept: INTERNAL MEDICINE | Facility: CLINIC | Age: 45
End: 2025-01-13
Payer: MEDICAID

## 2025-01-27 ENCOUNTER — PATIENT MESSAGE (OUTPATIENT)
Dept: INTERNAL MEDICINE | Facility: CLINIC | Age: 45
End: 2025-01-27
Payer: MEDICAID

## 2025-01-27 DIAGNOSIS — M77.9 BONE SPUR: Primary | ICD-10-CM

## 2025-02-04 RX ORDER — BUPROPION HYDROCHLORIDE 150 MG/1
150 TABLET, EXTENDED RELEASE ORAL 2 TIMES DAILY
Qty: 180 TABLET | Refills: 3 | Status: SHIPPED | OUTPATIENT
Start: 2025-02-04 | End: 2026-02-04

## 2025-02-20 RX ORDER — ZOLPIDEM TARTRATE 10 MG
10 TABLET ORAL NIGHTLY PRN
COMMUNITY
Start: 2024-11-05 | End: 2025-06-04

## 2025-02-20 RX ORDER — QUETIAPINE FUMARATE 300 MG/1
300 TABLET, FILM COATED ORAL NIGHTLY
COMMUNITY

## 2025-02-20 RX ORDER — PANTOPRAZOLE SODIUM 40 MG/1
40 TABLET, DELAYED RELEASE ORAL 2 TIMES DAILY
COMMUNITY

## 2025-02-21 ENCOUNTER — OFFICE VISIT (OUTPATIENT)
Dept: NEUROLOGY | Facility: CLINIC | Age: 45
End: 2025-02-21
Payer: MEDICAID

## 2025-02-21 VITALS
HEIGHT: 67 IN | RESPIRATION RATE: 18 BRPM | SYSTOLIC BLOOD PRESSURE: 121 MMHG | HEART RATE: 90 BPM | TEMPERATURE: 98 F | OXYGEN SATURATION: 99 % | BODY MASS INDEX: 45.99 KG/M2 | DIASTOLIC BLOOD PRESSURE: 79 MMHG | WEIGHT: 293 LBS

## 2025-02-21 DIAGNOSIS — G43.011 MIGRAINE WITHOUT AURA, INTRACTABLE, WITH STATUS MIGRAINOSUS: Primary | ICD-10-CM

## 2025-02-21 DIAGNOSIS — E66.01 CLASS 3 SEVERE OBESITY DUE TO EXCESS CALORIES WITH SERIOUS COMORBIDITY AND BODY MASS INDEX (BMI) OF 45.0 TO 49.9 IN ADULT: ICD-10-CM

## 2025-02-21 DIAGNOSIS — G47.33 OSA ON CPAP: ICD-10-CM

## 2025-02-21 DIAGNOSIS — E66.813 CLASS 3 SEVERE OBESITY DUE TO EXCESS CALORIES WITH SERIOUS COMORBIDITY AND BODY MASS INDEX (BMI) OF 45.0 TO 49.9 IN ADULT: ICD-10-CM

## 2025-02-21 PROCEDURE — 99215 OFFICE O/P EST HI 40 MIN: CPT | Mod: PBBFAC | Performed by: NURSE PRACTITIONER

## 2025-02-21 NOTE — PROGRESS NOTES
[facility] Neurology Follow Up Office Visit Note    Subjective:      Patient ID: Abdi Combs is a 44 y.o. female.    Chief Complaint: Migraine (Pt states the migraines are better)    HPI   This is a 43 YO  R Handed F with PMHX of HTN, Depression, Anxiety, hysterectomy, LAURA on CPAP, pancreatitis was referred for Migraine w/ status migrainosus. She was last seen on 10/15/2024. During that visit, warren 300 mg BID and Ubrelvy 100mg PO BID PRN were continued. Emgality monthly was initiated.     Today, Pt denies migraines on Emgality. Ubrelvy continues to be effective as abortive therapy, but has not required since Emgality. Taking gabapentin 300mg BID. Not currently utilizing CPAP machine as she states she needs a mask. Sleeping well on quetiapine    Age of Onset - around the time of puberty    Semiology - bifrontal, pounding, 10/10, lasting 1 day, w/ nausea and photophobia; .    Frequency - 12 migraine days per month; none on Emgality    Risk Factors -  - Family history of headache disorder? Mom with headache  - History of Head Trauma? denied  - History of CNS infection? denied  - History of underlying mood disorder? anxiety, depression.  - Illicit drug use? denied  - Tobacco use? denied  - History of sleep disorder? moderate LAURA. Started on CPAP in 9/2020. Sleeping better with CPAP.    Workup -  - MRI Brain: denied  [facility] no [facility] abnormality identified    Current ppx meds -  Venlafaxine XR 150mg BID (4/29/2024 - present)- not effective for migraine  Gabapentin 300mg PO QHS (3/4/2024 - present) not effective at this dose  Wellbutrin SR 150mg PO BID (9/17/2024 - present)  Emgality 120mg/mL SQ monthly (10/15/2024 - present) effective    Current abortive meds -  Ubrelvy 100mg PO BID PRN (3/4/2024 - present) effective    Prior ppx meds -  Elavil [id number] - ineffective  TPM 100mg daily (1/2018 - 4/2018) - ineffective  Lexapro (stopped 2/26/2020)  Effexor 150mg daily (5/27/2020 -  6/27/2020) - dizziness  Ajovy once per month (11/16/2020 - 8/16/2021) - ineffective  Lisinopril 10mg daily  Ketorolac 10mg PO BID PRN (3/4/2024 - 9/24/2024) effective  Procardia XL 90mg - not effective for migraine     Prior abortive meds -   Compazine - dystonia  Fioricet prn - only partially effective. Has rebound headaches afterwards.  Naproxen prn - BID  Sumatriptan 100mg PO Qday PRN    Results for orders placed or performed in visit on 11/20/24   Helicobacter Pylori Antigen Fecal EIA    Collection Time: 11/19/24  5:00 PM   Result Value Ref Range    H. pylori Stool Negative    Comprehensive Metabolic Panel    Collection Time: 11/20/24  7:20 AM   Result Value Ref Range    Sodium 140 136 - 145 mmol/L    Potassium 3.3 (L) 3.5 - 5.1 mmol/L    Chloride 101 98 - 107 mmol/L    CO2 30 (H) 22 - 29 mmol/L    Glucose 97 74 - 100 mg/dL    Blood Urea Nitrogen 12.2 7.0 - 18.7 mg/dL    Creatinine 0.78 0.55 - 1.02 mg/dL    Calcium 9.0 8.4 - 10.2 mg/dL    Protein Total 8.4 (H) 6.4 - 8.3 gm/dL    Albumin 3.8 3.5 - 5.0 g/dL    Globulin 4.6 (H) 2.4 - 3.5 gm/dL    Albumin/Globulin Ratio 0.8 (L) 1.1 - 2.0 ratio    Bilirubin Total 0.5 <=1.5 mg/dL    ALP 83 40 - 150 unit/L    ALT 16 0 - 55 unit/L    AST 17 5 - 34 unit/L    eGFR >60 mL/min/1.73/m2    Anion Gap 9.0 mEq/L    BUN/Creatinine Ratio 16    Hemoglobin A1C    Collection Time: 11/20/24  7:20 AM   Result Value Ref Range    Hemoglobin A1c 5.6 <=7.0 %    Estimated Average Glucose 114.0 mg/dL   Magnesium    Collection Time: 11/20/24  7:20 AM   Result Value Ref Range    Magnesium Level 1.80 1.60 - 2.60 mg/dL   CBC with Differential    Collection Time: 11/20/24  7:20 AM   Result Value Ref Range    WBC 9.25 4.50 - 11.50 x10(3)/mcL    RBC 5.07 4.20 - 5.40 x10(6)/mcL    Hgb 13.7 12.0 - 16.0 g/dL    Hct 40.3 37.0 - 47.0 %    MCV 79.5 (L) 80.0 - 94.0 fL    MCH 27.0 27.0 - 31.0 pg    MCHC 34.0 33.0 - 36.0 g/dL    RDW 14.9 11.5 - 17.0 %    Platelet 330 130 - 400 x10(3)/mcL    MPV 9.8 7.4 -  10.4 fL    Neut % 72.0 %    Lymph % 19.5 %    Mono % 7.5 %    Eos % 0.6 %    Basophil % 0.3 %    Lymph # 1.80 0.6 - 4.6 x10(3)/mcL    Neut # 6.66 2.1 - 9.2 x10(3)/mcL    Mono # 0.69 0.1 - 1.3 x10(3)/mcL    Eos # 0.06 0 - 0.9 x10(3)/mcL    Baso # 0.03 <=0.2 x10(3)/mcL    Imm Gran # 0.01 0 - 0.04 x10(3)/mcL    Imm Grans % 0.1 %       Review of Systems    Constitutional: no fever, fatigue, weakness  Eye: no vision loss, eye redness, drainage, or pain  ENMT: no sore throat, ear pain, sinus pain/congestion, nasal congestion/drainage  Respiratory: no cough, no wheezing, no shortness of breath  Cardiovascular: no chest pain, no palpitations, no edema  Gastrointestinal: no nausea, vomiting, or diarrhea. No abdominal pain  Genitourinary: no dysuria, no urinary frequency or urgency, no hematuria  Hema/Lymph: no abnormal bruising or bleeding  Endocrine: no heat or cold intolerance, no excessive thirst or excessive urination  Musculoskeletal: no muscle or joint pain, no joint swelling  Integumentary: no skin rash or abnormal lesion  Psychiatric: no depressed mood, no anxiety, no visual/auditory hallucinations, no delusions, no suicidal or homicidal ideation  Neurologic: as per HPI    Objective:      Physical Exam    General: well-developed well-nourished in no acute distress  Eye: clear conjunctiva, eyelids normal  HENT: oropharynx without erythema/exudate, oropharynx mucosal surface moist  Neck: full range of motion  Respiratory: no distress on RA  Cardiovascular: RRR  Gastrointestinal: round, no distension, no guarding  Musculoskeletal: full range of motion of all extremities  Integumentary: no visible rashes rashes, lesions or jaundice    Comprehensive Neurological Exam:  Mental Status: Alert Oriented to Self, Date, and Place. Naming, repetition, and reading wnl. Comprehension wnl. No dysarthria.   CN II - XII: JEFFY, No APD, VA grossly intact to finger counting at 6 ft, VFFC, No ptosis OU, EOMI without nystagmus, LT/Temp  symmetric in CN V1-3 distribution, Hearing grossly intact, Face Symmetric, Tongue and Uvula midline, Trapezius symmetric bilateral.   Motor: tone and bulk wnl throughout, no abnormal involuntary or voluntary movements, 5/5 to confrontation, Fine finger movements wnl b/l, No pronator drift.   Sensory: LT, Proprioception, Vibration, PP, Temp symmetric. No sensory simultagnosia.   Reflexes: 2+ throughout   Cerebellar: FNF wnl b/l  Romberg: Negative  Gait: normal. Heel Gait, Toe Gait, Tandem Gait wnl. Bilat arm swing intact     Assessment:       This is a 45 YO  R Handed F with PMHX of HTN, Depression, Anxiety, hysterectomy, LAURA on CPAP (not currently), pancreatitis, with Migraine w/ status migrainosus. Had a history of [place] Syndrome in 12/2020. Denies migraine on Emgality. Ubrelvy effective as abortive therapy, but has not required since Emgality. Sleeping well on Seroquel, however, having difficulty acquiring her CPAP mask. This is not a medication overuse HA. Walking daily as a     1. Migraine without aura, intractable, with status migrainosus    2. Class 3 severe obesity due to excess calories with serious comorbidity and body mass index (BMI) of 45.0 to 49.9 in adult    3. LAURA on CPAP        Plan:       [] c/w Ubrelvy 100mg PO BID at onset of migraine; triptans contraindicated in uncontrolled HTN  [] c/w warren 300mg to BID or 2 caps nightly for migraine and sleep  [] c/w Emgality montly with loading dose on first month followed by maintenance dose. To be used as preventative therapy  [] start exercise program 30 min daily x 5 days weekly for overall health and wellness  [] handout on diet and health    RTC 6 months    Headache education provided - including good sleep hygiene, stress management, medication overuse education provided - using more 3 OTC per week may worsen headaches, High intensity interval training has shown to reduce headache frequency. Low carb, high protein  has shown to reduce headache frequency. Patient is instructed to keep headache diary.    I have explained the treatment plan, diagnosis, and prognosis to the patient, caretaker, family. All questions are answered to the best of my knowledge.    Face to Face Time 30 minute, including, counseling, education, review of test results, relevant medical records, and coordination of care.     Kayleigh Retana, NP-C  General Neurology

## 2025-02-24 ENCOUNTER — OFFICE VISIT (OUTPATIENT)
Dept: INTERNAL MEDICINE | Facility: CLINIC | Age: 45
End: 2025-02-24
Payer: MEDICAID

## 2025-02-24 VITALS
DIASTOLIC BLOOD PRESSURE: 89 MMHG | SYSTOLIC BLOOD PRESSURE: 121 MMHG | HEART RATE: 102 BPM | RESPIRATION RATE: 20 BRPM | OXYGEN SATURATION: 99 % | BODY MASS INDEX: 47.21 KG/M2 | WEIGHT: 293 LBS | TEMPERATURE: 98 F

## 2025-02-24 DIAGNOSIS — N95.1 HOT FLASHES DUE TO MENOPAUSE: Primary | ICD-10-CM

## 2025-02-24 DIAGNOSIS — E66.01 MORBID OBESITY: ICD-10-CM

## 2025-02-24 DIAGNOSIS — E66.813 CLASS 3 SEVERE OBESITY DUE TO EXCESS CALORIES WITH SERIOUS COMORBIDITY AND BODY MASS INDEX (BMI) OF 45.0 TO 49.9 IN ADULT: ICD-10-CM

## 2025-02-24 DIAGNOSIS — E66.01 CLASS 3 SEVERE OBESITY DUE TO EXCESS CALORIES WITH SERIOUS COMORBIDITY AND BODY MASS INDEX (BMI) OF 45.0 TO 49.9 IN ADULT: ICD-10-CM

## 2025-02-24 DIAGNOSIS — E55.9 VITAMIN D DEFICIENCY: ICD-10-CM

## 2025-02-24 DIAGNOSIS — M77.9 BONE SPUR: ICD-10-CM

## 2025-02-24 DIAGNOSIS — I10 PRIMARY HYPERTENSION: ICD-10-CM

## 2025-02-24 PROCEDURE — 99213 OFFICE O/P EST LOW 20 MIN: CPT | Mod: PBBFAC

## 2025-02-24 RX ORDER — NALTREXONE HYDROCHLORIDE 50 MG/1
50 TABLET, FILM COATED ORAL DAILY
Qty: 30 TABLET | Refills: 0 | Status: SHIPPED | OUTPATIENT
Start: 2025-02-24 | End: 2025-03-26

## 2025-02-24 RX ORDER — CHOLECALCIFEROL (VITAMIN D3) 25 MCG
1000 TABLET ORAL DAILY
Qty: 90 TABLET | Refills: 1 | Status: SHIPPED | OUTPATIENT
Start: 2025-02-24 | End: 2025-08-23

## 2025-02-24 RX ORDER — NIFEDIPINE 60 MG/1
60 TABLET, EXTENDED RELEASE ORAL DAILY
Qty: 30 TABLET | Refills: 11 | Status: SHIPPED | OUTPATIENT
Start: 2025-02-24 | End: 2026-02-24

## 2025-02-24 RX ORDER — IBUPROFEN 800 MG/1
800 TABLET ORAL 3 TIMES DAILY
Qty: 5 TABLET | Refills: 0 | Status: SHIPPED | OUTPATIENT
Start: 2025-02-24

## 2025-02-24 NOTE — PROGRESS NOTES
Patient Name: Abdi Combs  MRN: [id number]              [facility]    Subjective:        Ms. Abdi Combs is a 42 y.o. female w/ PMH hypertension, GERD, anxiety/depression, allergic rhinitis. s/p FESS 8/11/17 who comes to clinic today for follow up.  Patient has been having nonspecific abdominal pain s/p 1-2 hours after the.  Patient says this pain is chronic and last.  Patient was hospitalized in May and was found to have edematous pancreatitis patient underwent MRCP with no underlying blockage.  Patient's triglyceride and does not have any alcohol use, patient's pancreatitis was then attributed to lisinopril even otherwise noted chronic changes noted.      7/2022 Patient has been miserable she says she is scared of eating, she says that she has only been eating bananas because she says that it is good for your stomach and causes less pain.  Patient is still having pain.  Patient also says that she also been having some migraines which have been intermittent, she says she does not have any migraines at this time and she has an appointment later this month when Neurology.     10/2022:  Patient says that her chest pain is much improved, but she does have some chest pain that radiates to her back similar to the  pain when she had her pancreatitis.  Patient says that this is seldomly, approximately 1-2 times per month.  These episodes only last approximately 15 minutes.  Patient says that she is able to eat without any issues, except for things that or spicy.  Patient's migraines are much improved s/p treatment.     04/2023:   -patient is having issues with anxiety, says that she is unable to sleep.  She is taking Klonopin for her anxiety.  She says that she had been taking some antidepressant in the past however her doctor has put her on Ativan and now currently on Klonopin.  Her main complaint today is knee pain which she says has gotten worse, she has continued to gain weight and  currently has BMI 47.55 she is not heard back from the surgeons for possible knee replacement.  She has not done physical therapy at all.    11/2023  Anxiety and depression symptoms are uncontrolled and worse at this time. Patient says that she lost her family member who was shot, she feels guilty because that she was in a meeting, and unable to see him. Denies any SI. Sleep is an issue. Patient would like contrave.     2/2024  More bills than work. Sleep is 3-4 hours, trouble going to sleep. Anhedonia. Guilt, wants more than she has. Energy issues. Concentration. No si/hi. Appetite over eating. Patient would like to continue contrave. She has severe MDD, and usually just stays in the dark without doing anything.     9/2024  Patient continuing to complain about anxiety today. Says she found support group at [facility] outpatient but it is conflicting with her work times now. States she had about 17 lb weight loss with naltrexone and buproprion and would like to continue with it. Continuing to follow with neurology for migraines. Is using CPAP and compliant with it. States she needs a new mask and has been having trouble with getting one from [employer].     2/2025  Patient continuing to complain of anxiety and depression. States she continues to see outside psychiatrist and therapist now more often than normal and she is having her meds adjusted as well. Is ready to pay for naltrexone but states it needs to be sent to [facility] instead. Has bone spur present on left back of ankle that is tender to palpation and is going on a 5 mile walk next week and is asking for pain med. Waiting on ortho surgery referral. Continues to feel hot flashes. Does not want estrogen therapy at this time.      Review of Systems:  The remainder of the 14 system ROS is noncontributory or negative unless mentioned/reviewed above.    Objective:     Vitals:    02/24/25 1419   BP: 121/89   Pulse: 102   Resp: 20   Temp: 98.4 °F (36.9 °C)  "      Laboratory:  Lab Results   Component Value Date    WBC 8.2 02/02/2023    HGB 13.3 02/02/2023    HCT 41.0 02/02/2023     02/02/2023    MCV 75.6 (L) 02/02/2023    RDW 15.5 02/02/2023    IRON 41 (L) 02/02/2023    TIBC 241 (L) 02/02/2023    FERRITIN 48.50 02/02/2023     Lab Results   Component Value Date    HGBA1C 5.4 05/22/2022    .8 09/08/2021    CREATININE 0.71 02/02/2023    Lab Results   Component Value Date     02/02/2023    K 3.5 02/02/2023    CHLORIDE 102 02/02/2023    CO2 28 02/02/2023    BUN 8.5 02/02/2023    CREATININE 0.71 02/02/2023    CALCIUM 9.5 02/02/2023    MG 1.90 02/02/2023    PHOS 4.4 02/02/2023     Lab Results   Component Value Date    TSH 1.453 02/02/2023    [id number] 1.06 02/02/2023            Current Medications:  Current Outpatient Medications   Medication Instructions    buPROPion (WELLBUTRIN SR) 150 mg, Oral, 2 times daily    cholecalciferol (vitamin D3) (VITAMIN D3) 1,000 Units, Oral, Daily    clonazePAM (KLONOPIN) 2 mg, Oral, Daily    ferrous sulfate 324 mg, Oral, Every other day    fluticasone propionate (FLONASE) 50 mcg/actuation nasal spray 1 spray, Nasal, Daily PRN    gabapentin (NEURONTIN) 300 mg, Oral, Nightly    hydroCHLOROthiazide (HYDRODIURIL) 25 mg, Oral, Daily    ketorolac (TORADOL) 10 mg, Oral, 2 times daily    NIFEdipine (PROCARDIA-XL) 90 mg, Oral, Daily    pantoprazole (PROTONIX) 40 mg, Oral, Daily    sumatriptan (IMITREX) 100 mg, Oral, Every 12 hours PRN    venlafaxine (EFFEXOR-XR) 150 mg, Oral, 2 times daily          2D ECHO Results  No results found for this or any previous visit.      Pulmonary Functions Testing Results:    No results found for: "FEV1", "FVC", "VNB8CPG", "TLC", "DLCO"  Assessment and Plan:        Health Maintenance Due   Topic Date Due    Colorectal Cancer Screening  Never done    Mammogram  07/29/2023    Influenza Vaccine (1) 09/01/2023    COVID-19 Vaccine (3 - 2023-24 season) 09/01/2023      Hypertension:  -well controlled   - " continue Procardia and chlorthalidone    Allergies:  -cetirizine     MDD/anxiety:  -Wellbutrin 150 bid, seroquel 3000 qhs for sleep  -clonazepam 2 mg daily  Ambien 10 mg prn    Morbid obesity:  -contrave- wellbutrin/naltrexone   -naltrexone not being covered by insurance will have to pay out of pocket     Migraines:  -continue Ubrelvy 100 mg po bid   -gabapentin 300 mg BID for migraine and sleep-patient stopped due to not helping    -continue emgality monthly   -continue effexor    Hot flashes   -had hysterectomy and does not want estrogen therapy   - prescribed veozah which is non estrogen therapy to trial     Bone spur  -referral to ortho surgery sent     LAURA with CPAP:  -sleep study in [facility]   -reports CPAP compliance and benefit from using   - needs help with supplies, given fam's number today     RTC in 6 months.     Lyla Matt MD  Internal Medicine PGY-II

## 2025-03-11 ENCOUNTER — OFFICE VISIT (OUTPATIENT)
Dept: GASTROENTEROLOGY | Facility: CLINIC | Age: 45
End: 2025-03-11
Payer: MEDICAID

## 2025-03-11 ENCOUNTER — RESULTS FOLLOW-UP (OUTPATIENT)
Dept: GASTROENTEROLOGY | Facility: CLINIC | Age: 45
End: 2025-03-11

## 2025-03-11 ENCOUNTER — LAB VISIT (OUTPATIENT)
Dept: LAB | Facility: HOSPITAL | Age: 45
End: 2025-03-11
Attending: NURSE PRACTITIONER
Payer: MEDICAID

## 2025-03-11 VITALS
WEIGHT: 293 LBS | HEIGHT: 67 IN | RESPIRATION RATE: 16 BRPM | TEMPERATURE: 98 F | HEART RATE: 94 BPM | BODY MASS INDEX: 45.99 KG/M2 | DIASTOLIC BLOOD PRESSURE: 83 MMHG | OXYGEN SATURATION: 99 % | SYSTOLIC BLOOD PRESSURE: 117 MMHG

## 2025-03-11 DIAGNOSIS — I10 PRIMARY HYPERTENSION: ICD-10-CM

## 2025-03-11 DIAGNOSIS — K21.9 GASTROESOPHAGEAL REFLUX DISEASE, UNSPECIFIED WHETHER ESOPHAGITIS PRESENT: ICD-10-CM

## 2025-03-11 DIAGNOSIS — A04.8 HELICOBACTER PYLORI INFECTION: ICD-10-CM

## 2025-03-11 DIAGNOSIS — Z12.11 SCREENING FOR COLON CANCER: ICD-10-CM

## 2025-03-11 DIAGNOSIS — K86.1 CHRONIC PANCREATITIS, UNSPECIFIED PANCREATITIS TYPE: Primary | ICD-10-CM

## 2025-03-11 DIAGNOSIS — K59.04 CHRONIC IDIOPATHIC CONSTIPATION: ICD-10-CM

## 2025-03-11 DIAGNOSIS — K76.0 HEPATIC STEATOSIS: ICD-10-CM

## 2025-03-11 DIAGNOSIS — E55.9 VITAMIN D DEFICIENCY: ICD-10-CM

## 2025-03-11 LAB
25(OH)D3+25(OH)D2 SERPL-MCNC: 38 NG/ML (ref 30–80)
ALBUMIN SERPL-MCNC: 3.9 G/DL (ref 3.5–5)
ALBUMIN/GLOB SERPL: 0.8 RATIO (ref 1.1–2)
ALP SERPL-CCNC: 76 UNIT/L (ref 40–150)
ALT SERPL-CCNC: 17 UNIT/L (ref 0–55)
ANION GAP SERPL CALC-SCNC: 9 MEQ/L
AST SERPL-CCNC: 20 UNIT/L (ref 5–34)
BASOPHILS # BLD AUTO: 0.03 X10(3)/MCL
BASOPHILS NFR BLD AUTO: 0.3 %
BILIRUB SERPL-MCNC: 0.5 MG/DL
BUN SERPL-MCNC: 14.6 MG/DL (ref 7–18.7)
CALCIUM SERPL-MCNC: 9.4 MG/DL (ref 8.4–10.2)
CHLORIDE SERPL-SCNC: 100 MMOL/L (ref 98–107)
CO2 SERPL-SCNC: 31 MMOL/L (ref 22–29)
CREAT SERPL-MCNC: 0.76 MG/DL (ref 0.55–1.02)
CREAT/UREA NIT SERPL: 19
EOSINOPHIL # BLD AUTO: 0.04 X10(3)/MCL (ref 0–0.9)
EOSINOPHIL NFR BLD AUTO: 0.4 %
ERYTHROCYTE [DISTWIDTH] IN BLOOD BY AUTOMATED COUNT: 15.4 % (ref 11.5–17)
EST. AVERAGE GLUCOSE BLD GHB EST-MCNC: 108.3 MG/DL
GFR SERPLBLD CREATININE-BSD FMLA CKD-EPI: >60 ML/MIN/1.73/M2
GLOBULIN SER-MCNC: 4.9 GM/DL (ref 2.4–3.5)
GLUCOSE SERPL-MCNC: 107 MG/DL (ref 74–100)
HBA1C MFR BLD: 5.4 %
HCT VFR BLD AUTO: 41.6 % (ref 37–47)
HGB BLD-MCNC: 14.3 G/DL (ref 12–16)
IMM GRANULOCYTES # BLD AUTO: 0.02 X10(3)/MCL (ref 0–0.04)
IMM GRANULOCYTES NFR BLD AUTO: 0.2 %
INR PPP: 1
LYMPHOCYTES # BLD AUTO: 2.45 X10(3)/MCL (ref 0.6–4.6)
LYMPHOCYTES NFR BLD AUTO: 24.6 %
MCH RBC QN AUTO: 26.5 PG (ref 27–31)
MCHC RBC AUTO-ENTMCNC: 34.4 G/DL (ref 33–36)
MCV RBC AUTO: 77 FL (ref 80–94)
MONOCYTES # BLD AUTO: 0.78 X10(3)/MCL (ref 0.1–1.3)
MONOCYTES NFR BLD AUTO: 7.8 %
NEUTROPHILS # BLD AUTO: 6.63 X10(3)/MCL (ref 2.1–9.2)
NEUTROPHILS NFR BLD AUTO: 66.7 %
NRBC BLD AUTO-RTO: 0 %
PLATELET # BLD AUTO: 379 X10(3)/MCL (ref 130–400)
PMV BLD AUTO: 10.6 FL (ref 7.4–10.4)
POTASSIUM SERPL-SCNC: 3.1 MMOL/L (ref 3.5–5.1)
PROT SERPL-MCNC: 8.8 GM/DL (ref 6.4–8.3)
PROTHROMBIN TIME: 13.3 SECONDS (ref 11.4–14)
RBC # BLD AUTO: 5.4 X10(6)/MCL (ref 4.2–5.4)
SODIUM SERPL-SCNC: 140 MMOL/L (ref 136–145)
WBC # BLD AUTO: 9.95 X10(3)/MCL (ref 4.5–11.5)

## 2025-03-11 PROCEDURE — 3008F BODY MASS INDEX DOCD: CPT | Mod: CPTII,,, | Performed by: NURSE PRACTITIONER

## 2025-03-11 PROCEDURE — 3074F SYST BP LT 130 MM HG: CPT | Mod: CPTII,,, | Performed by: NURSE PRACTITIONER

## 2025-03-11 PROCEDURE — 99215 OFFICE O/P EST HI 40 MIN: CPT | Mod: PBBFAC | Performed by: NURSE PRACTITIONER

## 2025-03-11 PROCEDURE — 99214 OFFICE O/P EST MOD 30 MIN: CPT | Mod: S$PBB,,, | Performed by: NURSE PRACTITIONER

## 2025-03-11 PROCEDURE — 3079F DIAST BP 80-89 MM HG: CPT | Mod: CPTII,,, | Performed by: NURSE PRACTITIONER

## 2025-03-11 PROCEDURE — 36415 COLL VENOUS BLD VENIPUNCTURE: CPT

## 2025-03-11 PROCEDURE — 82306 VITAMIN D 25 HYDROXY: CPT

## 2025-03-11 PROCEDURE — 85610 PROTHROMBIN TIME: CPT

## 2025-03-11 PROCEDURE — 80053 COMPREHEN METABOLIC PANEL: CPT

## 2025-03-11 PROCEDURE — 1159F MED LIST DOCD IN RCRD: CPT | Mod: CPTII,,, | Performed by: NURSE PRACTITIONER

## 2025-03-11 PROCEDURE — 83036 HEMOGLOBIN GLYCOSYLATED A1C: CPT

## 2025-03-11 PROCEDURE — 0003M LIVER DIS 10 ASSAYS W/NASH: CPT

## 2025-03-11 PROCEDURE — 1160F RVW MEDS BY RX/DR IN RCRD: CPT | Mod: CPTII,,, | Performed by: NURSE PRACTITIONER

## 2025-03-11 PROCEDURE — 85025 COMPLETE CBC W/AUTO DIFF WBC: CPT

## 2025-03-11 RX ORDER — LUBIPROSTONE 24 UG/1
24 CAPSULE ORAL 2 TIMES DAILY
Qty: 60 CAPSULE | Refills: 5 | Status: SHIPPED | OUTPATIENT
Start: 2025-03-11

## 2025-03-11 RX ORDER — PANTOPRAZOLE SODIUM 40 MG/1
40 TABLET, DELAYED RELEASE ORAL DAILY
Qty: 30 TABLET | Refills: 5 | Status: SHIPPED | OUTPATIENT
Start: 2025-03-11

## 2025-03-11 NOTE — ASSESSMENT & PLAN NOTE
She underwent EUS with Dr. Bowen April 24, 2024 with findings of mild antral erythema, mild lobularity without honeycombing and scattered hyperechoic foci and strands throughout the pancreatic parenchyma, changes likely secondary to previous acute pancreatitis episode or not sufficient to diagnose chronic pancreatitis.  Liver with upper a colic echotexture consistent with steatosis.  Pathology revealed focally active chronic gastritis positive for Helicobacter organisms by immunohistochemical staining.  H pylori stool antigen positive July 19, 2024.  She was treated at this time.  H pylori stool antigen negative November 19, 2024.  Lifestyle and dietary modifications provided  Recommend good control of comorbidities  Recommend weight loss  CBC, CMP, PT/INR, Fibrosure  Abdominal ultrasound  Unable to complete Fibroscan due to BMI 47.46

## 2025-03-11 NOTE — PROGRESS NOTES
Subjective:       Patient ID: Abdi Combs is a 44 y.o. female.    Chief Complaint: Chronic pancreatitis, unspecified pancreatitis type (Pt states that she's having food get stuck in her throat. Things like donuts, bread, crackers and so on, wont digest well. Mostly bread stuff. )    This 44-year-old  female with anxiety, depression, hypertension, cholecystectomy, and pancreatitis presents unaccompanied for a follow-up visit.  She was referred for pancreatitis and GERD and seen January 23, 2024.  She reported being out of pantoprazole for the past week with frequent regurgitation of acid and occasional subsequent vomiting of a foamy substance that awoke her in the middle of the night.  She would notice symptoms throughout the day depending on what she ate as well.  Her appetite was good and her weight was stable.  She denied fever, chills, hematemesis, odynophagia, dysphagia, early satiety, or abdominal pain.  She reported a longstanding history of chronic constipation and having 1-2 bowel movements per week of small amount.  She would sit on the toilet for long periods of time in order to have a bowel movement.  She had tried Dulcolax, MiraLax, and stool softeners in the past with minimal relief noted.  She denied melena, hematochezia, fecal urgency, fecal incontinence, or pain with defecation.  She reported good relief with dilation after last EGD procedure April 2019 and had no further issues of dysphagia since that time.     CT chest, abdomen and pelvis with contrast November 8, 2022 revealed interval resolution of the inflammatory changes in the upper mid mesentery and peripancreatic region when compared to the prior examination.  No acute process seen.      CT abdomen with contrast July 28, 2022 revealed mild stranding involving the superior mesentery and near the pancreatic head/uncinate.  This may relate to pancreatitis or nonspecific mild mesenteric congestion.  No organized fluid  collection or evidence of pancreatic necrosis.      CT scan abdomen and pelvis without contrast June 28, 2022 revealed thickening of the small bowel in the mid and upper abdomen with subtle mesenteric vascular congestion.  An element of enteritis is a concern.  There is mild peripancreatic fat stranding at the level of the uncinate process of the pancreas (series 3, image 49).  An element of pancreatitis is a concern.  Details and other findings as discussed above.  No significant discrepancy with overnight report.      CT scan abdomen and pelvis with contrast May 24, 2022 revealed fatty induration or inflammatory change fall thickening involving the distal stomach and proximal duodenum.  The fatty induration is in the region of the head of the pancreas but the pancreas enhances normally.  Suspect acute inflammatory process arises from the stomach and duodenum.  There is no evidence of pancreatic necrosis.     MRI MRCP without contrast May 23, 2022 revealed acute pancreatitis.  No evidence of choledocholithiasis.  1 cm right renal cyst.  Moderate hepatic steatosis.  Cholecystectomy.     Abdominal ultrasound May 22, 2022 was unremarkable.     CT scan abdomen and pelvis without contrast May 22, 2022 revealed findings consistent with acute interstitial edematous pancreatitis, without associated ductal dilatation, focal lesion, or peripancreatic collection.  No other evidence of acute or suspicious intra-abdominal process.  No significant discrepancy identified in relation to the teleradiology preliminary report.  Additional details provided above.    She underwent EUS with Dr. Bowen April 24, 2024 with findings of mild antral erythema, mild lobularity without honeycombing and scattered hyperechoic foci and strands throughout the pancreatic parenchyma, changes likely secondary to previous acute pancreatitis episode or not sufficient to diagnose chronic pancreatitis.  Liver with upper a colic echotexture consistent  with steatosis.  Pathology revealed focally active chronic gastritis positive for Helicobacter organisms by immunohistochemical staining.  H pylori stool antigen positive July 19, 2024.  She was treated at this time.  H pylori stool antigen negative November 19, 2024.    Colonoscopy scheduled June 9, 2025.    Today, she presents for a follow-up visit.  She reports taking Linzess 145 mcg as needed mostly on the weekends as she has urgency and diarrhea when she takes it during the week while she is at work.  She has limited access to the restroom during working hours.  She reports going up to 1 week without a bowel movement when she does not take Linzess.  She has occasional straining.  She does not always feel completely evacuated with defecation.  She denies melena, hematochezia, fecal urgency, fecal incontinence, or pain with defecation.  She has frequent acid reflux and regurgitation of acid noted throughout the day.  This is relieved with pantoprazole 40 mg daily when she takes her medication.  She has been out of her medication for the past few days.  She has occasional dysphagia with solid foods, specifically bread and crackers.  She describes a sensation of something feeling stuck at the back of her throat with subsequent choking and vomiting at times.  She has relief with vomiting.  Emesis is nonbilious and nonbloody.  Her appetite is good and her weight is stable.  She denies fever, chills, hematemesis, odynophagia, early satiety, or abdominal pain.  She denies icterus, jaundice, pruritus, confusion, headaches, vision changes, cough, shortness of breath, chest pain, abdominal/lower extremity swelling, dark-colored urine, or pale-colored stools.     She underwent EGD April 15, 2019 secondary to GERD, dysphagia, and abnormal UGI with findings of GE junction at 39 cm, GE junction intubated at an angle with a shelf-like appearance to the esophagus just proximal to it, lumen appeared narrowed without any obvious  stricture, balloon dilation was performed with CRE balloon with 15-16.5-18 mm, normal stomach, normal duodenum.     She denies ever having a colonoscopy done.  She denies regular NSAID use or use of blood thinners.  She denies tobacco or alcohol use.  She denies illicit drug use.  She denies a family history of IBD, colon polyps, or colon cancer.    Review of patient's allergies indicates:  No Known Allergies    Past Medical History:   Diagnosis Date    Acute pancreatitis     Anxiety     Cholelithiasis     Depression     GERD (gastroesophageal reflux disease)     Headache     Hepatic steatosis 3/11/2025    Hypertension      Past Surgical History:   Procedure Laterality Date     SECTION      CHOLECYSTECTOMY      HYSTERECTOMY      TUBAL LIGATION      UPPER GASTROINTESTINAL ENDOSCOPY       Family History:   family history includes Diabetes in her father; Heart disease in her father; Hypertension in her father and mother; Kidney disease in her maternal grandmother; Learning disabilities in her son.    Social History:    reports that she has never smoked. She has never used smokeless tobacco. She reports that she does not currently use alcohol. She reports that she does not use drugs.    Review of Systems  Negative except as noted in the HPI.      Objective:      Physical Exam  Constitutional:       Appearance: Normal appearance.   HENT:      Head: Normocephalic.      Mouth/Throat:      Mouth: Mucous membranes are moist.   Eyes:      Extraocular Movements: Extraocular movements intact.      Conjunctiva/sclera: Conjunctivae normal.      Pupils: Pupils are equal, round, and reactive to light.   Cardiovascular:      Rate and Rhythm: Normal rate and regular rhythm.      Pulses: Normal pulses.      Heart sounds: Normal heart sounds.   Pulmonary:      Effort: Pulmonary effort is normal.      Breath sounds: Normal breath sounds.   Abdominal:      General: Bowel sounds are normal.      Palpations: Abdomen is soft.    Musculoskeletal:         General: Normal range of motion.      Cervical back: Normal range of motion and neck supple.   Skin:     General: Skin is warm and dry.   Neurological:      General: No focal deficit present.      Mental Status: She is alert and oriented to person, place, and time.   Psychiatric:         Mood and Affect: Mood normal.         Behavior: Behavior normal.         Thought Content: Thought content normal.         Judgment: Judgment normal.         Home Medications:     Current Outpatient Medications   Medication Sig    AMBIEN 10 mg Tab Take 10 mg by mouth nightly as needed.    ASHWAGANDHA EXTRACT ORAL Take by mouth.    buPROPion (WELLBUTRIN SR) 150 MG TBSR 12 hr tablet Take 1 tablet (150 mg total) by mouth 2 (two) times daily.    chlorthalidone (HYGROTEN) 25 MG Tab Take 0.5 tablets (12.5 mg total) by mouth once daily.    clonazePAM (KLONOPIN) 2 MG Tab Take 2 mg by mouth once daily.    galcanezumab-gnlm 120 mg/mL PnIj Inject 1 mL (120 mg total) into the skin every 28 days. maintenance dose    naltrexone (DEPADE) 50 mg tablet Take 1 tablet (50 mg total) by mouth once daily.    NIFEdipine (PROCARDIA-XL) 60 MG (OSM) 24 hr tablet Take 1 tablet (60 mg total) by mouth once daily.    QUEtiapine (SEROQUEL) 300 MG Tab Take 300 mg by mouth every evening.    ubrogepant (UBRELVY) 100 mg tablet Take 1 tablet (100 mg total) by mouth 2 (two) times a day. If symptoms persist or return, may repeat dose after 2 hours. Maximum: 200 mg per 24 hours    venlafaxine (EFFEXOR-XR) 150 MG Cp24 Take 150 mg by mouth once daily.    vitamin D (VITAMIN D3) 1000 units Tab Take 1 tablet (1,000 Units total) by mouth once daily.    fezolinetant 45 mg Tab Take 45 mg by mouth once daily. (Patient not taking: Reported on 3/11/2025)    ibuprofen (ADVIL,MOTRIN) 800 MG tablet Take 1 tablet (800 mg total) by mouth 3 (three) times daily. (Patient not taking: Reported on 3/11/2025)    lubiprostone (AMITIZA) 24 MCG Cap Take 1 capsule (24  mcg total) by mouth 2 (two) times daily.    pantoprazole (PROTONIX) 40 MG tablet Take 1 tablet (40 mg total) by mouth once daily.     No current facility-administered medications for this visit.     Laboratory Results:     Recent Results (from the past 24 weeks)   Helicobacter Pylori Antigen Fecal EIA    Collection Time: 11/19/24  5:00 PM   Result Value Ref Range    H. pylori Stool Negative    Comprehensive Metabolic Panel    Collection Time: 11/20/24  7:20 AM   Result Value Ref Range    Sodium 140 136 - 145 mmol/L    Potassium 3.3 (L) 3.5 - 5.1 mmol/L    Chloride 101 98 - 107 mmol/L    CO2 30 (H) 22 - 29 mmol/L    Glucose 97 74 - 100 mg/dL    Blood Urea Nitrogen 12.2 7.0 - 18.7 mg/dL    Creatinine 0.78 0.55 - 1.02 mg/dL    Calcium 9.0 8.4 - 10.2 mg/dL    Protein Total 8.4 (H) 6.4 - 8.3 gm/dL    Albumin 3.8 3.5 - 5.0 g/dL    Globulin 4.6 (H) 2.4 - 3.5 gm/dL    Albumin/Globulin Ratio 0.8 (L) 1.1 - 2.0 ratio    Bilirubin Total 0.5 <=1.5 mg/dL    ALP 83 40 - 150 unit/L    ALT 16 0 - 55 unit/L    AST 17 5 - 34 unit/L    eGFR >60 mL/min/1.73/m2    Anion Gap 9.0 mEq/L    BUN/Creatinine Ratio 16    Hemoglobin A1C    Collection Time: 11/20/24  7:20 AM   Result Value Ref Range    Hemoglobin A1c 5.6 <=7.0 %    Estimated Average Glucose 114.0 mg/dL   Magnesium    Collection Time: 11/20/24  7:20 AM   Result Value Ref Range    Magnesium Level 1.80 1.60 - 2.60 mg/dL   CBC with Differential    Collection Time: 11/20/24  7:20 AM   Result Value Ref Range    WBC 9.25 4.50 - 11.50 x10(3)/mcL    RBC 5.07 4.20 - 5.40 x10(6)/mcL    Hgb 13.7 12.0 - 16.0 g/dL    Hct 40.3 37.0 - 47.0 %    MCV 79.5 (L) 80.0 - 94.0 fL    MCH 27.0 27.0 - 31.0 pg    MCHC 34.0 33.0 - 36.0 g/dL    RDW 14.9 11.5 - 17.0 %    Platelet 330 130 - 400 x10(3)/mcL    MPV 9.8 7.4 - 10.4 fL    Neut % 72.0 %    Lymph % 19.5 %    Mono % 7.5 %    Eos % 0.6 %    Basophil % 0.3 %    Lymph # 1.80 0.6 - 4.6 x10(3)/mcL    Neut # 6.66 2.1 - 9.2 x10(3)/mcL    Mono # 0.69 0.1 - 1.3  x10(3)/mcL    Eos # 0.06 0 - 0.9 x10(3)/mcL    Baso # 0.03 <=0.2 x10(3)/mcL    Imm Gran # 0.01 0 - 0.04 x10(3)/mcL    Imm Grans % 0.1 %     Assessment/Plan:     Problem List Items Addressed This Visit          GI    Chronic pancreatitis - Primary    CT chest, abdomen and pelvis with contrast November 8, 2022 revealed interval resolution of the inflammatory changes in the upper mid mesentery and peripancreatic region when compared to the prior examination.  No acute process seen.   CT abdomen with contrast July 28, 2022 revealed mild stranding involving the superior mesentery and near the pancreatic head/uncinate.  This may relate to pancreatitis or nonspecific mild mesenteric congestion.  No organized fluid collection or evidence of pancreatic necrosis.   CT scan abdomen and pelvis without contrast June 28, 2022 revealed thickening of the small bowel in the mid and upper abdomen with subtle mesenteric vascular congestion.  An element of enteritis is a concern.  There is mild peripancreatic fat stranding at the level of the uncinate process of the pancreas (series 3, image 49).  An element of pancreatitis is a concern.  Details and other findings as discussed above.  No significant discrepancy with overnight report.   CT scan abdomen and pelvis with contrast May 24, 2022 revealed fatty induration or inflammatory change fall thickening involving the distal stomach and proximal duodenum.  The fatty induration is in the region of the head of the pancreas but the pancreas enhances normally.  Suspect acute inflammatory process arises from the stomach and duodenum.  There is no evidence of pancreatic necrosis.  MRI MRCP without contrast May 23, 2022 revealed acute pancreatitis.  No evidence of choledocholithiasis.  1 cm right renal cyst.  Moderate hepatic steatosis.  Cholecystectomy.  Abdominal ultrasound May 22, 2022 was unremarkable.  CT scan abdomen and pelvis without contrast May 22, 2022 revealed findings consistent  with acute interstitial edematous pancreatitis, without associated ductal dilatation, focal lesion, or peripancreatic collection.  No other evidence of acute or suspicious intra-abdominal process.  No significant discrepancy identified in relation to the teleradiology preliminary report.  Additional details provided above.  Denies problems with pancreatitis since 2022  She underwent EUS with Dr. Bowen April 24, 2024 with findings of mild antral erythema, mild lobularity without honeycombing and scattered hyperechoic foci and strands throughout the pancreatic parenchyma, changes likely secondary to previous acute pancreatitis episode or not sufficient to diagnose chronic pancreatitis.  Liver with upper a colic echotexture consistent with steatosis.  Pathology revealed focally active chronic gastritis positive for Helicobacter organisms by immunohistochemical staining.  H pylori stool antigen positive July 19, 2024.  She was treated at this time.  H pylori stool antigen negative November 19, 2024.  Call with updates  ER precautions provided  F/u clinic visit with NP in 6 months in a 45 minute slot (after date of scheduled procedure)         Gastroesophageal reflux disease    See above  She underwent EGD April 15, 2019 secondary to GERD, dysphagia, and abnormal UGI with findings of GE junction at 39 cm, GE junction intubated at an angle with a shelf-like appearance to the esophagus just proximal to it, lumen appeared narrowed without any obvious stricture, balloon dilation was performed with CRE balloon with 15-16.5-18 mm, normal stomach, normal duodenum.  GERD lifestyle modifications  Reflux precautions  Limit NSAID use  Will refill pantoprazole 40 mg daily  EGD with possible dilation (will try to add to colonoscopy procedure June 9, 2025 - message sent to [facility])         Relevant Medications    pantoprazole (PROTONIX) 40 MG tablet    Other Relevant Orders    Case Request Endoscopy: EGD (ESOPHAGOGASTRODUODENOSCOPY)  (Completed)    Helicobacter pylori infection    She underwent EUS with Dr. Bowen April 24, 2024 with findings of mild antral erythema, mild lobularity without honeycombing and scattered hyperechoic foci and strands throughout the pancreatic parenchyma, changes likely secondary to previous acute pancreatitis episode or not sufficient to diagnose chronic pancreatitis.  Liver with upper a colic echotexture consistent with steatosis.  Pathology revealed focally active chronic gastritis positive for Helicobacter organisms by immunohistochemical staining.    H pylori stool antigen positive July 19, 2024.  She was treated at this time.    H pylori stool antigen negative November 19, 2024.         Chronic idiopathic constipation    Recommend soluble fiber supplementation  Avoid straining or sitting on the toilet for long periods of time  Stop Linzess 145 mcg daily and start Amitiza 24 mcg twice daily         Relevant Medications    lubiprostone (AMITIZA) 24 MCG Cap    Hepatic steatosis    She underwent EUS with Dr. Bowen April 24, 2024 with findings of mild antral erythema, mild lobularity without honeycombing and scattered hyperechoic foci and strands throughout the pancreatic parenchyma, changes likely secondary to previous acute pancreatitis episode or not sufficient to diagnose chronic pancreatitis.  Liver with upper a colic echotexture consistent with steatosis.  Pathology revealed focally active chronic gastritis positive for Helicobacter organisms by immunohistochemical staining.  H pylori stool antigen positive July 19, 2024.  She was treated at this time.  H pylori stool antigen negative November 19, 2024.  Lifestyle and dietary modifications provided  Recommend good control of comorbidities  Recommend weight loss  CBC, CMP, PT/INR, Fibrosure  Abdominal ultrasound  Unable to complete Fibroscan due to BMI 47.46         Relevant Orders    CBC Auto Differential    Comprehensive Metabolic Panel    Protime-INR    US  Abdomen Limited    ESTEVEZ Fibrosure    Screening for colon cancer    Colonoscopy scheduled June 9, 2025

## 2025-03-11 NOTE — ASSESSMENT & PLAN NOTE
Recommend soluble fiber supplementation  Avoid straining or sitting on the toilet for long periods of time  Stop Linzess 145 mcg daily and start Amitiza 24 mcg twice daily

## 2025-03-11 NOTE — ASSESSMENT & PLAN NOTE
See above  She underwent EGD April 15, 2019 secondary to GERD, dysphagia, and abnormal UGI with findings of GE junction at 39 cm, GE junction intubated at an angle with a shelf-like appearance to the esophagus just proximal to it, lumen appeared narrowed without any obvious stricture, balloon dilation was performed with CRE balloon with 15-16.5-18 mm, normal stomach, normal duodenum.  GERD lifestyle modifications  Reflux precautions  Limit NSAID use  Will refill pantoprazole 40 mg daily  EGD with possible dilation (will try to add to colonoscopy procedure June 9, 2025 - message sent to Nancy)

## 2025-03-11 NOTE — LETTER
March 11, 2025      [facility]  [address] [address]  Phone: [phone]       Patient: Abdi Combs   YOB: 1980  Date of Visit: 03/11/2025    To Whom It May Concern:    Delfina Combs  was at [facility] on 03/11/2025. The patient may return to work/school on 03/11/25 with no restrictions. If you have any questions or concerns, or if I can be of further assistance, please do not hesitate to contact me.    Sincerely,    Kristen Phillip NP

## 2025-03-11 NOTE — ASSESSMENT & PLAN NOTE
CT chest, abdomen and pelvis with contrast November 8, 2022 revealed interval resolution of the inflammatory changes in the upper mid mesentery and peripancreatic region when compared to the prior examination.  No acute process seen.   CT abdomen with contrast July 28, 2022 revealed mild stranding involving the superior mesentery and near the pancreatic head/uncinate.  This may relate to pancreatitis or nonspecific mild mesenteric congestion.  No organized fluid collection or evidence of pancreatic necrosis.   CT scan abdomen and pelvis without contrast June 28, 2022 revealed thickening of the small bowel in the mid and upper abdomen with subtle mesenteric vascular congestion.  An element of enteritis is a concern.  There is mild peripancreatic fat stranding at the level of the uncinate process of the pancreas (series 3, image 49).  An element of pancreatitis is a concern.  Details and other findings as discussed above.  No significant discrepancy with overnight report.   CT scan abdomen and pelvis with contrast May 24, 2022 revealed fatty induration or inflammatory change fall thickening involving the distal stomach and proximal duodenum.  The fatty induration is in the region of the head of the pancreas but the pancreas enhances normally.  Suspect acute inflammatory process arises from the stomach and duodenum.  There is no evidence of pancreatic necrosis.  MRI MRCP without contrast May 23, 2022 revealed acute pancreatitis.  No evidence of choledocholithiasis.  1 cm right renal cyst.  Moderate hepatic steatosis.  Cholecystectomy.  Abdominal ultrasound May 22, 2022 was unremarkable.  CT scan abdomen and pelvis without contrast May 22, 2022 revealed findings consistent with acute interstitial edematous pancreatitis, without associated ductal dilatation, focal lesion, or peripancreatic collection.  No other evidence of acute or suspicious intra-abdominal process.  No significant discrepancy identified in relation to  the teleradiology preliminary report.  Additional details provided above.  Denies problems with pancreatitis since 2022  She underwent EUS with Dr. Bowen April 24, 2024 with findings of mild antral erythema, mild lobularity without honeycombing and scattered hyperechoic foci and strands throughout the pancreatic parenchyma, changes likely secondary to previous acute pancreatitis episode or not sufficient to diagnose chronic pancreatitis.  Liver with upper a colic echotexture consistent with steatosis.  Pathology revealed focally active chronic gastritis positive for Helicobacter organisms by immunohistochemical staining.  H pylori stool antigen positive July 19, 2024.  She was treated at this time.  H pylori stool antigen negative November 19, 2024.  Call with updates  ER precautions provided  F/u clinic visit with NP in 6 months in a 45 minute slot (after date of scheduled procedure)

## 2025-03-11 NOTE — ASSESSMENT & PLAN NOTE
She underwent EUS with Dr. Bowen April 24, 2024 with findings of mild antral erythema, mild lobularity without honeycombing and scattered hyperechoic foci and strands throughout the pancreatic parenchyma, changes likely secondary to previous acute pancreatitis episode or not sufficient to diagnose chronic pancreatitis.  Liver with upper a colic echotexture consistent with steatosis.  Pathology revealed focally active chronic gastritis positive for Helicobacter organisms by immunohistochemical staining.    H pylori stool antigen positive July 19, 2024.  She was treated at this time.    H pylori stool antigen negative November 19, 2024.

## 2025-03-11 NOTE — PROGRESS NOTES
Please notify PT/INR within normal limits.  CMP with low potassium.  Would recommend potassium rich foods and defer to PCP for continued monitoring.  LFTs within normal limits.  CBC okay.  Thanks

## 2025-03-14 LAB
A2 MACROGLOB SERPL-MCNC: 161 MG/DL (ref 100–280)
ALT SERPL W P-5'-P-CCNC: 17 U/L (ref 7–45)
ANNOTATION COMMENT IMP: ABNORMAL
APO A-I SERPL-MCNC: 159 MG/DL
AST SERPL W P-5'-P-CCNC: 23 U/L (ref 8–43)
BILIRUB SERPL-MCNC: 0.4 MG/DL (ref 0–1.2)
CHOLEST SERPL-MCNC: 199 MG/DL
FIBROSIS STAGE SERPL QL: ABNORMAL
GGT SERPL-CCNC: 40 U/L (ref 5–36)
GLUCOSE P FAST SERPL-MCNC: 106 MG/DL (ref 70–100)
HAPTOGLOB SERPL NEPH-MCNC: 86 MG/DL (ref 30–200)
LIVER FIBR SCORE SERPL CALC.FIBROSURE: 0.12
LIVER FIBROSIS INTERPRETATION SER-IMP: ABNORMAL
LIVER STEATOSIS GRADE SERPL QL: ABNORMAL
LIVER STEATOSIS INTERP SERPL-IMP: ABNORMAL
LIVER STEATOSIS SCORE SERPL: 0.46
NASH GRADE SERPL QL: ABNORMAL
NASH INTERPRETATION SERPL-IMP: ABNORMAL
NASH SCORE SERPL: 0.3
SERIAL #: ABNORMAL
TRIGL SERPL-MCNC: 123 MG/DL

## 2025-03-19 ENCOUNTER — HOSPITAL ENCOUNTER (OUTPATIENT)
Dept: RADIOLOGY | Facility: HOSPITAL | Age: 45
Discharge: HOME | End: 2025-03-19
Attending: NURSE PRACTITIONER
Payer: MEDICAID

## 2025-03-19 DIAGNOSIS — K76.0 HEPATIC STEATOSIS: ICD-10-CM

## 2025-03-19 PROCEDURE — 76705 ECHO EXAM OF ABDOMEN: CPT | Mod: TC

## 2025-03-19 NOTE — PROGRESS NOTES
Please notify borderline size of the liver with demonstration of diffuse increased echogenicity which may reflect fatty infiltration of the liver.  Thanks

## 2025-03-31 RX ORDER — NALTREXONE HYDROCHLORIDE 50 MG/1
50 TABLET, FILM COATED ORAL DAILY
COMMUNITY
Start: 2025-03-03 | End: 2025-03-31 | Stop reason: SDUPTHER

## 2025-03-31 RX ORDER — NALTREXONE HYDROCHLORIDE 50 MG/1
50 TABLET, FILM COATED ORAL DAILY
Qty: 30 TABLET | Refills: 3 | Status: SHIPPED | OUTPATIENT
Start: 2025-03-31

## 2025-04-15 RX ORDER — CHLORTHALIDONE 25 MG/1
12.5 TABLET ORAL DAILY
Qty: 15 TABLET | Refills: 11 | Status: SHIPPED | OUTPATIENT
Start: 2025-04-15 | End: 2026-04-15

## 2025-05-05 DIAGNOSIS — Z12.11 SCREENING FOR COLON CANCER: Primary | ICD-10-CM

## 2025-05-05 RX ORDER — POLYETHYLENE GLYCOL 3350, SODIUM SULFATE, SODIUM CHLORIDE, POTASSIUM CHLORIDE, SODIUM ASCORBATE, AND ASCORBIC ACID 7.5-2.691G
2000 KIT ORAL SEE ADMIN INSTRUCTIONS
Qty: 1 KIT | Refills: 0 | Status: SHIPPED | OUTPATIENT
Start: 2025-05-05

## 2025-05-05 NOTE — TELEPHONE ENCOUNTER
----- Message from Luzma sent at 8/7/2024  1:45 PM CDT -----  Regarding: Prep dori 6/9/25  Mov[facility]

## 2025-05-22 DIAGNOSIS — G43.011 MIGRAINE WITHOUT AURA, INTRACTABLE, WITH STATUS MIGRAINOSUS: Primary | ICD-10-CM

## 2025-06-09 ENCOUNTER — HOSPITAL ENCOUNTER (OUTPATIENT)
Facility: HOSPITAL | Age: 45
Discharge: HOME | End: 2025-06-09
Attending: INTERNAL MEDICINE | Admitting: INTERNAL MEDICINE
Payer: MEDICAID

## 2025-06-09 ENCOUNTER — ANESTHESIA EVENT (OUTPATIENT)
Dept: ENDOSCOPY | Facility: HOSPITAL | Age: 45
End: 2025-06-09
Payer: MEDICAID

## 2025-06-09 ENCOUNTER — ANESTHESIA (OUTPATIENT)
Dept: ENDOSCOPY | Facility: HOSPITAL | Age: 45
End: 2025-06-09
Payer: MEDICAID

## 2025-06-09 DIAGNOSIS — Z12.11 SCREENING FOR COLON CANCER: ICD-10-CM

## 2025-06-09 DIAGNOSIS — R13.10 DYSPHAGIA, UNSPECIFIED TYPE: Primary | ICD-10-CM

## 2025-06-09 DIAGNOSIS — R13.10 DYSPHAGIA, UNSPECIFIED TYPE: ICD-10-CM

## 2025-06-09 PROBLEM — A04.8 HELICOBACTER PYLORI INFECTION: Status: RESOLVED | Noted: 2025-03-11 | Resolved: 2025-06-09

## 2025-06-09 PROBLEM — K85.90 ACUTE PANCREATITIS: Status: RESOLVED | Noted: 2022-05-22 | Resolved: 2025-06-09

## 2025-06-09 PROCEDURE — 63600175 PHARM REV CODE 636 W HCPCS: Performed by: ANESTHESIOLOGY

## 2025-06-09 PROCEDURE — 88312 SPECIAL STAINS GROUP 1: CPT | Mod: TC | Performed by: INTERNAL MEDICINE

## 2025-06-09 PROCEDURE — 37000008 HC ANESTHESIA 1ST 15 MINUTES: Performed by: INTERNAL MEDICINE

## 2025-06-09 PROCEDURE — 43239 EGD BIOPSY SINGLE/MULTIPLE: CPT | Performed by: INTERNAL MEDICINE

## 2025-06-09 PROCEDURE — 25000003 PHARM REV CODE 250: Performed by: INTERNAL MEDICINE

## 2025-06-09 PROCEDURE — 27201423 OPTIME MED/SURG SUP & DEVICES STERILE SUPPLY: Performed by: INTERNAL MEDICINE

## 2025-06-09 PROCEDURE — 37000009 HC ANESTHESIA EA ADD 15 MINS: Performed by: INTERNAL MEDICINE

## 2025-06-09 PROCEDURE — 45378 DIAGNOSTIC COLONOSCOPY: CPT | Performed by: INTERNAL MEDICINE

## 2025-06-09 PROCEDURE — 63600175 PHARM REV CODE 636 W HCPCS: Performed by: NURSE ANESTHETIST, CERTIFIED REGISTERED

## 2025-06-09 RX ORDER — LIDOCAINE HYDROCHLORIDE 20 MG/ML
INJECTION INTRAVENOUS
Status: DISCONTINUED | OUTPATIENT
Start: 2025-06-09 | End: 2025-06-09

## 2025-06-09 RX ORDER — PROPOFOL 10 MG/ML
INJECTION, EMULSION INTRAVENOUS
Status: DISCONTINUED | OUTPATIENT
Start: 2025-06-09 | End: 2025-06-09

## 2025-06-09 RX ORDER — SODIUM CHLORIDE, SODIUM LACTATE, POTASSIUM CHLORIDE, CALCIUM CHLORIDE 600; 310; 30; 20 MG/100ML; MG/100ML; MG/100ML; MG/100ML
125 INJECTION, SOLUTION INTRAVENOUS CONTINUOUS
Status: ACTIVE | OUTPATIENT
Start: 2025-06-09 | End: 2025-06-09

## 2025-06-09 RX ORDER — GLUCAGON 1 MG
1 KIT INJECTION
OUTPATIENT
Start: 2025-06-09

## 2025-06-09 RX ORDER — DEXTROMETHORPHAN/PSEUDOEPHED 2.5-7.5/.8
DROPS ORAL
Status: COMPLETED | OUTPATIENT
Start: 2025-06-09 | End: 2025-06-09

## 2025-06-09 RX ORDER — OXYCODONE AND ACETAMINOPHEN 5; 325 MG/1; MG/1
1 TABLET ORAL
Refills: 0 | OUTPATIENT
Start: 2025-06-09

## 2025-06-09 RX ORDER — ONDANSETRON HYDROCHLORIDE 2 MG/ML
4 INJECTION, SOLUTION INTRAVENOUS ONCE AS NEEDED
OUTPATIENT
Start: 2025-06-09 | End: 2036-11-04

## 2025-06-09 RX ORDER — IPRATROPIUM BROMIDE AND ALBUTEROL SULFATE 2.5; .5 MG/3ML; MG/3ML
3 SOLUTION RESPIRATORY (INHALATION) ONCE AS NEEDED
OUTPATIENT
Start: 2025-06-09 | End: 2036-11-04

## 2025-06-09 RX ORDER — HYDROMORPHONE HYDROCHLORIDE 1 MG/ML
0.4 INJECTION, SOLUTION INTRAMUSCULAR; INTRAVENOUS; SUBCUTANEOUS EVERY 10 MIN PRN
Refills: 0 | OUTPATIENT
Start: 2025-06-09

## 2025-06-09 RX ORDER — DIPHENHYDRAMINE HYDROCHLORIDE 50 MG/ML
12.5 INJECTION, SOLUTION INTRAMUSCULAR; INTRAVENOUS ONCE AS NEEDED
OUTPATIENT
Start: 2025-06-09 | End: 2036-11-04

## 2025-06-09 RX ADMIN — PROPOFOL 120 MCG/KG/MIN: 10 INJECTION, EMULSION INTRAVENOUS at 09:06

## 2025-06-09 RX ADMIN — SODIUM CHLORIDE, POTASSIUM CHLORIDE, SODIUM LACTATE AND CALCIUM CHLORIDE 10 ML/HR: 600; 310; 30; 20 INJECTION, SOLUTION INTRAVENOUS at 08:06

## 2025-06-09 RX ADMIN — LIDOCAINE HYDROCHLORIDE 50 MG: 20 INJECTION INTRAVENOUS at 08:06

## 2025-06-09 RX ADMIN — PROPOFOL 60 MG: 10 INJECTION, EMULSION INTRAVENOUS at 09:06

## 2025-06-09 NOTE — PROVATION PATIENT INSTRUCTIONS
Discharge Summary/Instructions after an Endoscopic Procedure  Patient Name: Abdi Combs  Patient MRN: [id number]  Patient YOB: 1980 Monday, June 9, 2025  Marylu Hector MD  Dear patient,  As a result of recent federal legislation (The Federal Cures Act), you may   receive lab or pathology results from your procedure in your [facility]   account before your physician is able to contact you. Your physician or   their representative will relay the results to you with their   recommendations at their soonest availability.  Thank you,  RESTRICTIONS:  During your procedure today, you received medications for sedation.  These   medications may affect your judgment, balance and coordination.  Therefore,   for 24 hours, you have the following restrictions:   - DO NOT drive a car, operate machinery, make legal/financial decisions,   sign important papers or drink alcohol.    ACTIVITY:  Today: no heavy lifting, straining or running due to procedural   sedation/anesthesia.  The following day: return to full activity including work.  DIET:  Eat and drink normally unless instructed otherwise.     TREATMENT FOR COMMON SIDE EFFECTS:  - Mild abdominal pain, nausea, belching, bloating or excessive gas:  rest,   eat lightly and use a heating pad.  - Sore Throat: treat with throat lozenges and/or gargle with warm salt   water.  - Because air was used during the procedure, expelling large amounts of air   from your rectum or belching is normal.  - If a bowel prep was taken, you may not have a bowel movement for 1-3 days.    This is normal.  SYMPTOMS TO WATCH FOR AND REPORT TO YOUR PHYSICIAN:  1. Abdominal pain or bloating, other than gas cramps.  2. Chest pain.  3. Back pain.  4. Signs of infection such as: chills or fever occurring within 24 hours   after the procedure.  5. Rectal bleeding, which would show as bright red, maroon, or black stools.   (A tablespoon of blood from the rectum is not serious, especially if    hemorrhoids are present.)  6. Vomiting.  7. Weakness or dizziness.  GO DIRECTLY TO THE NEAREST EMERGENCY ROOM IF YOU HAVE ANY OF THE FOLLOWING:      Difficulty breathing              Chills and/or fever over 101 F   Persistent vomiting and/or vomiting blood   Severe abdominal pain   Severe chest pain   Black, tarry stools   Bleeding- more than one tablespoon   Any other symptom or condition that you feel may need urgent attention  Your doctor recommends these additional instructions:  If any biopsies were taken, your doctors clinic will contact you in 1 to 2   weeks with any results.  Recommendations:  - Patient has a contact number available for emergencies.  The signs and   symptoms of potential delayed complications were discussed with the   patient.  Return to normal activities tomorrow.  Written discharge   instructions were provided to the patient.   - Discharge patient to home.   - Resume previous diet.   - Continue present medications.   - Repeat colonoscopy in 10 years for screening purposes.  Impressions:  - The examined portion of the ileum was normal.   - Internal hemorrhoids.   - No specimens collected.  For questions, problems or results please call your physician - Marylu Hector MD at Work:  [phone], Work:  [phone].  [facility] , EMERGENCY ROOM PHONE NUMBER: [phone]  IF A COMPLICATION OR EMERGENCY SITUATION ARISES AND YOU ARE UNABLE TO REACH   YOUR PHYSICIAN - GO DIRECTLY TO THE EMERGENCY ROOM.  Marylu Hector MD  6/9/2025 10:15:42 AM  This report has been verified and signed electronically.  Dear patient,  As a result of recent federal legislation (The Federal Cures Act), you may   receive lab or pathology results from your procedure in your [facility]   account before your physician is able to contact you. Your physician or   their representative will relay the results to you with their   recommendations at their soonest availability.  Thank  you,  PROVATION

## 2025-06-09 NOTE — TRANSFER OF CARE
"Anesthesia Transfer of Care Note    Patient: Abdi Combs    Procedure(s) Performed: Procedure(s) (LRB):  DOUBLE (N/A)  EGD (ESOPHAGOGASTRODUODENOSCOPY) (N/A)    Patient location: GI    Anesthesia Type: general    Transport from OR: Transported from OR on room air with adequate spontaneous ventilation    Post pain: adequate analgesia    Post assessment: no apparent anesthetic complications    Post vital signs: stable    Level of consciousness: awake    Nausea/Vomiting: no nausea/vomiting    Complications: none    Transfer of care protocol was followed      Last vitals: Visit Vitals  BP (!) 137/100   Pulse 83   Temp 36.8 °C (98.2 °F) (Oral)   Resp 20   Ht 5' 7" (1.702 m)   Wt 133.4 kg (294 lb)   SpO2 97%   BMI 46.05 kg/m²     "

## 2025-06-09 NOTE — H&P
History and Physical    Patient Name: Abdi Combs  YOB: 1980  Date: 2025 8:31 AM  Date of Admission: 2025  HD#0  POD#* Day of Surgery *    PRESENTING HISTORY   Chief Complaint/Reason for Admission: <principal problem not specified>    History of Present Illness:  45 y.o. female with PMH HTN, anxiety, allergic rhinitis, pancreatitis, constipation, dysphagia with solid foods, and GERD presenting today for diagnostic EGD and screening colonoscopy. Has never had a colonoscopy before. Had EGD in 2019 with narrowed distal esophagus s/p dilation to 10 mm. She reported good resolution of her dysphagia after that but it has since worsened especially with dense foods like bread. Also had EUS w/ Dr. Bowen 2024 for chronic pancreatitis type symptoms an was noted to have H pylori gastritis for which she completed treatment (Negative stol antigen 24). She takes protonix daily which has her GERD symptoms relatively controlled but still reports some awakening with burning sensation. She takes linzess as needed and reports having a bowel movements a couple times per week with occasional straining. Denies blood in her stool, denies hematemesis or coffee ground emesis. No family history of colon cancer or IBD.     Denies any recent fever, chills, nausea, vomiting, chest pain, abdominal pain, bleeding per rectum.    Abdominal symptoms - chronic reflux, occasional dysphagia to dense foods, chronic constipation  Family history - none  Prior colonoscopy - never  Anticoagulation - none    Review of Systems:  12 point ROS negative except as stated in HPI    PAST HISTORY:   Past medical history:  Past Medical History:   Diagnosis Date    Acute pancreatitis     Anxiety     Cholelithiasis     Depression     GERD (gastroesophageal reflux disease)     Headache     Hepatic steatosis 3/11/2025    Hypertension        Past surgical history:  Past Surgical History:   Procedure Laterality Date      SECTION      CHOLECYSTECTOMY      HYSTERECTOMY      TUBAL LIGATION      UPPER GASTROINTESTINAL ENDOSCOPY         Family history:  Family History   Problem Relation Name Age of Onset    Diabetes Father      Hypertension Father      Heart disease Father      Hypertension Mother Jess Wade     Kidney disease Maternal Grandmother Nereida Wade     Learning disabilities Son Rosalinda Combs        Social history:  Social History     Socioeconomic History    Marital status: Single    Number of children: 2   Occupational History    Occupation:    Tobacco Use    Smoking status: Never    Smokeless tobacco: Never   Vaping Use    Vaping status: Never Used   Substance and Sexual Activity    Alcohol use: Not Currently    Drug use: Never    Sexual activity: Not Currently     Partners: Male     Comment: Celibate     Social Drivers of Health     Financial Resource Strain: High Risk (2/21/2025)    Overall Financial Resource Strain (CARDIA)     Difficulty of Paying Living Expenses: Hard   Food Insecurity: Food Insecurity Present (2/21/2025)    Hunger Vital Sign     Ran Out of Food in the Last Year: Sometimes true   Transportation Needs: Unmet Transportation Needs (2/21/2025)    PRAPARE - Transportation     Lack of Transportation (Medical): Yes     Lack of Transportation (Non-Medical): Yes   Physical Activity: Sufficiently Active (2/21/2025)    Exercise Vital Sign     Days of Exercise per Week: 5 days     Minutes of Exercise per Session: 60 min   Stress: Stress Concern Present (2/21/2025)    Citizen of [place] [employer] - Occupational Stress Questionnaire     Feeling of Stress : Very much   Housing Stability: High Risk (2/21/2025)    Housing Stability Vital Sign     Unable to Pay for Housing in the Last Year: Yes     Number of Times Moved in the Last Year: 0     Homeless in the Last Year: No     Tobacco Use History[1]   Social History     Substance and Sexual Activity   Alcohol Use Not Currently     "    MEDICATIONS & ALLERGIES:     No current facility-administered medications on file prior to encounter.     Current Outpatient Medications on File Prior to Encounter   Medication Sig    clonazePAM (KLONOPIN) 2 MG Tab Take 2 mg by mouth once daily.       Allergies: Review of patient's allergies indicates:  No Known Allergies    Scheduled Meds:    Continuous Infusions:   lactated ringers  125 mL/hr Intravenous Continuous           PRN Meds:    OBJECTIVE:   Vital Signs:  VITAL SIGNS: 24 HR MIN & MAX LAST   Temp  Min: 98.2 °F (36.8 °C)  Max: 98.2 °F (36.8 °C)  98.2 °F (36.8 °C)   BP  Min: 137/100  Max: 137/100  (!) 137/100    Pulse  Min: 83  Max: 83  83    Resp  Min: 20  Max: 20  20    SpO2  Min: 97 %  Max: 97 %  97 %      HT: 5' 7" (170.2 cm)  WT: 133.4 kg (294 lb)  BMI: 46     Intake/output:  Intake/Output - Last 3 Shifts       None          No intake or output data in the 24 hours ending 06/09/25 0831      Physical Exam:  General: Well developed, well nourished, no acute distress  HEENT: Normocephalic, atraumatic, PERRL  CV: RR  Resp: NWOB  GI:  Abdomen soft, non-tender, non-distended, no guarding, no rebound, normoactive bowel sounds, no masses   :  Deferred  MSK: No muscle atrophy, cyanosis, peripheral edema, moving all extremities spontaneously  Skin/wounds:  No rashes, ulcers, erythema  Neuro:  CNII-XII grossly intact, alert and oriented to person, place, and time    Labs:  Troponin:  No results for input(s): "TROPONINI" in the last 72 hours.  CBC:  No results for input(s): "WBC", "RBC", "HGB", "HCT", "PLT", "MCV", "MCH", "MCHC" in the last 72 hours.  CMP:  No results for input(s): "GLU", "CALCIUM", "ALBUMIN", "PROT", "NA", "K", "CO2", "CL", "BUN", "CREATININE", "ALKPHOS", "ALT", "AST", "BILITOT" in the last 72 hours.  Lactic Acid:  No results for input(s): "LACTATE" in the last 72 hours.  ETOH:  No results for input(s): "ETHANOL" in the last 72 hours.   Urine Drug Screen:  No results for input(s): "COCAINE", " ""OPIATE", "BARBITURATE", "AMPHETAMINE", "FENTANYL", "CANNABINOIDS", "MDMA" in the last 72 hours.    Invalid input(s): "BENZODIAZEPINE", "PHENCYCLIDINE"   ABG:  No results for input(s): "PH", "PO2", "PCO2", "HCO3", "BE" in the last 168 hours.     Diagnostic Results:  No orders to display       ASSESSMENT & PLAN:    Abdi Combs is a 45 y.o. female presenting today for diagnostic EGD and screening colonoscopy    Consent signed at bedside  Endo suite today for EGD and colonoscopy    Farrukh Esparza MD  [employer] General Surgery, PGY-2  06/09/2025         [1]   Social History  Tobacco Use   Smoking Status Never   Smokeless Tobacco Never     "

## 2025-06-09 NOTE — ANESTHESIA POSTPROCEDURE EVALUATION
Anesthesia Post Evaluation    Patient: Abdi Combs    Procedure(s) Performed: Procedure(s) (LRB):  DOUBLE (N/A)  EGD (ESOPHAGOGASTRODUODENOSCOPY) (N/A)    Final Anesthesia Type: general      Patient location during evaluation: GI PACU  Patient participation: Yes- Able to Participate  Level of consciousness: awake and alert  Post-procedure vital signs: reviewed and stable  Pain management: adequate  Airway patency: patent    PONV status at discharge: No PONV  Anesthetic complications: no      Cardiovascular status: blood pressure returned to baseline  Respiratory status: unassisted  Hydration status: euvolemic  Follow-up not needed.              Vitals Value Taken Time   /100 06/09/25 08:45   Temp 36.8 °C (98.2 °F) 06/09/25 08:27   Pulse 83 06/09/25 08:27   Resp 20 06/09/25 08:27   SpO2 97 % 06/09/25 08:27         No case tracking events are documented in the log.      Pain/Dudley Score: No data recorded

## 2025-06-09 NOTE — ANESTHESIA PREPROCEDURE EVALUATION
06/09/2025  Abdi Combs is a 45 y.o., female for colonoscopy.      Pre-op Assessment    I have reviewed the Patient Summary Reports.     I have reviewed the Nursing Notes. I have reviewed the NPO Status.   I have reviewed the Medications.     Review of Systems  Cardiovascular:     Hypertension, well controlled                                          Pulmonary:        Sleep Apnea, CPAP                Hepatic/GI:     GERD Liver Disease,               Neurological:      Headaches                                 Psych:   anxiety depression                Physical Exam  General: Well nourished, Cooperative, Alert and Oriented    Airway:  Mallampati: II / II  Mouth Opening: Normal  TM Distance: Normal  Tongue: Normal  Neck ROM: Normal ROM    Dental:  Intact        Anesthesia Plan  Type of Anesthesia, risks & benefits discussed:    Anesthesia Type: MAC  Intra-op Monitoring Plan: Standard ASA Monitors  Post Op Pain Control Plan: IV/PO Opioids PRN  Induction:  IV  Informed Consent: Informed consent signed with the Patient and all parties understand the risks and agree with anesthesia plan.  All questions answered.   ASA Score: 3    Ready For Surgery From Anesthesia Perspective.     .

## 2025-06-09 NOTE — PROVATION PATIENT INSTRUCTIONS
Discharge Summary/Instructions after an Endoscopic Procedure  Patient Name: Abdi Combs  Patient MRN: [id number]  Patient YOB: 1980 Monday, June 9, 2025  Marylu Hector MD  Dear patient,  As a result of recent federal legislation (The Federal Cures Act), you may   receive lab or pathology results from your procedure in your [facility]   account before your physician is able to contact you. Your physician or   their representative will relay the results to you with their   recommendations at their soonest availability.  Thank you,  RESTRICTIONS:  During your procedure today, you received medications for sedation.  These   medications may affect your judgment, balance and coordination.  Therefore,   for 24 hours, you have the following restrictions:   - DO NOT drive a car, operate machinery, make legal/financial decisions,   sign important papers or drink alcohol.    ACTIVITY:  Today: no heavy lifting, straining or running due to procedural   sedation/anesthesia.  The following day: return to full activity including work.  DIET:  Eat and drink normally unless instructed otherwise.     TREATMENT FOR COMMON SIDE EFFECTS:  - Mild abdominal pain, nausea, belching, bloating or excessive gas:  rest,   eat lightly and use a heating pad.  - Sore Throat: treat with throat lozenges and/or gargle with warm salt   water.  - Because air was used during the procedure, expelling large amounts of air   from your rectum or belching is normal.  - If a bowel prep was taken, you may not have a bowel movement for 1-3 days.    This is normal.  SYMPTOMS TO WATCH FOR AND REPORT TO YOUR PHYSICIAN:  1. Abdominal pain or bloating, other than gas cramps.  2. Chest pain.  3. Back pain.  4. Signs of infection such as: chills or fever occurring within 24 hours   after the procedure.  5. Rectal bleeding, which would show as bright red, maroon, or black stools.   (A tablespoon of blood from the rectum is not serious, especially if    hemorrhoids are present.)  6. Vomiting.  7. Weakness or dizziness.  GO DIRECTLY TO THE NEAREST EMERGENCY ROOM IF YOU HAVE ANY OF THE FOLLOWING:      Difficulty breathing              Chills and/or fever over 101 F   Persistent vomiting and/or vomiting blood   Severe abdominal pain   Severe chest pain   Black, tarry stools   Bleeding- more than one tablespoon   Any other symptom or condition that you feel may need urgent attention  Your doctor recommends these additional instructions:  If any biopsies were taken, your doctors clinic will contact you in 1 to 2   weeks with any results.  Recommendations:  - Patient has a contact number available for emergencies.  The signs and   symptoms of potential delayed complications were discussed with the   patient.  Return to normal activities tomorrow.  Written discharge   instructions were provided to the patient.   - Discharge patient to home.   - Resume previous diet.   - Continue present medications.   - Await pathology results.   - Perform routine esophageal manometry at appointment to be scheduled.  Impressions:  - Dilation in the upper third of the esophagus.   - Otherwise normal esophagus.   - Erythematous mucosa in the stomach.  Biopsied.   - Normal examined duodenum.   - Biopsies were taken with a cold forceps for evaluation of eosinophilic   esophagitis.  For questions, problems or results please call your physician - Marylu Hector MD at Work:  [phone], Work:  [phone].  [facility] , EMERGENCY ROOM PHONE NUMBER: [phone]  IF A COMPLICATION OR EMERGENCY SITUATION ARISES AND YOU ARE UNABLE TO REACH   YOUR PHYSICIAN - GO DIRECTLY TO THE EMERGENCY ROOM.  Marylu Hector MD  6/9/2025 10:17:28 AM  This report has been verified and signed electronically.  Dear patient,  As a result of recent federal legislation (The Federal Cures Act), you may   receive lab or pathology results from your procedure in your [facility]    account before your physician is able to contact you. Your physician or   their representative will relay the results to you with their   recommendations at their soonest availability.  Thank you,  PROVATION

## 2025-06-10 VITALS
SYSTOLIC BLOOD PRESSURE: 135 MMHG | HEIGHT: 67 IN | TEMPERATURE: 98 F | BODY MASS INDEX: 45.99 KG/M2 | OXYGEN SATURATION: 99 % | DIASTOLIC BLOOD PRESSURE: 76 MMHG | WEIGHT: 293 LBS | RESPIRATION RATE: 18 BRPM | HEART RATE: 73 BPM

## 2025-06-11 LAB
ESTROGEN SERPL-MCNC: NORMAL PG/ML
INSULIN SERPL-ACNC: NORMAL U[IU]/ML
LAB AP CLINICAL INFORMATION: NORMAL
LAB AP GROSS DESCRIPTION: NORMAL
LAB AP REPORT FOOTNOTES: NORMAL

## 2025-06-25 ENCOUNTER — HOSPITAL ENCOUNTER (OUTPATIENT)
Facility: HOSPITAL | Age: 45
Discharge: HOME | End: 2025-06-25
Attending: INTERNAL MEDICINE | Admitting: INTERNAL MEDICINE
Payer: MEDICAID

## 2025-06-25 PROCEDURE — 91010 ESOPHAGUS MOTILITY STUDY: CPT | Mod: TC | Performed by: INTERNAL MEDICINE

## 2025-06-25 RX ORDER — LIDOCAINE HYDROCHLORIDE 20 MG/ML
SOLUTION OROPHARYNGEAL
Status: DISCONTINUED
Start: 2025-06-25 | End: 2025-06-25 | Stop reason: HOSPADM

## 2025-06-25 NOTE — NURSING
Patient presented to advanced GI for manometry study, appropriate, cooperative, pleasant without any distress or anxiety.  Patient describes being instructed and prepared for procedure adequately.

## 2025-06-26 NOTE — PROGRESS NOTES
"Subjective:    Patient ID: Abdi Combs is a 45 y.o. female  who presented to [facility] for new visit..      Chief Complaint: Pain of the Left Foot and Pain of the Right Foot      History of Present Illness:    Abdi Combs is a 45-year-old female who presents to clinic today for bilateral heel pain that has been gone for greater than 6 months.  She has tried different shoes, insoles, heel cups, formal physical therapy without any significant improvement in her symptoms.  She reports her pain today as a 3/10 with the left worse than the right.  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   On bad days, she reports that her pain can be a 10/10 where she is unable to walk.  She reports that she had had tried a tall walking boot for a few weeks without any improvement in her symptoms.  She has done physical therapy for plantar fasciitis.  She denied any family history of any autoimmune disease such as lupus or rheumatoid arthritis.    Ankle/Foot Review of Systems:  Swelling?  yes  Instability?  no  Mechanical sx?  no  <30 min AM stiffness? no  Limited ROM? no  Fever/Chills? no    Comorbid Conditions  Morbid obesity         Objective:      Physical Exam:    /80 (Patient Position: Sitting)   Pulse 94   Temp 97.6 °F (36.4 °C)   Ht 5' 7" (1.702 m)   Wt 133.2 kg (293 lb 10.4 oz)   BMI 45.99 kg/m²     Ortho/SPM Exam    Appearance:  limping  FWB  Soft tissue swelling: Left: yes Right: yes  Effusion: Left:  Negative Right: Negative  Erythema: Left no Right: no  Ecchymosis: Left: no Right: no  Atrophy: Left: no Right: no    Palpation:  Ankle/Foot Tenderness: Left: achilles insertion Right: achilles insertion.    Range of motion:  Ankle dorsiflexion (20 degrees):     Left: 20 Right: 20  Ankle Plantar flexion (50 degrees): Left 50 Right: 50  Subtalar inversion (5 degrees): Left: 5 Right 5  Subtalar eversion (5 " degrees):  Left: 5 Right 5  Transverse/midtarsal: adduction (20 degrees): Left: 20 Right: 20  Transverse/midtarsal abduction (10 degrees): Left: 10 Right: 10    Strength:  Foot inversion/dorsiflexion (Deep Peroneal N. L4):Left: 5/5  Pain: no Right: 5/5  Pain: no  1st toe Dorsiflexion (Deep Peroneal N. L5): Left: 5/5  Pain: no Right: 5/5  Pain: no  Foot plantarflexion (Tibial N. S1): Left: 5/5  Pain: no Right: 5/5  Pain: no  Foot eversion (superficial peroneal L4-S1): Left: 5/5  Pain: no Right: 5/5  Pain: no      Special Tests:  Hui:  Left: Not performed     Right: Not performed   Anterior drawer: Left: Negative     Right: Negative   Talar tilt: Left: Negative      Right: Negative   External rotation stress (Kleiger's): Left: Negative  Right: Negative  Eversion stress: Left: Negative Right: Negative   Squeeze: Left: Negative  Right: Negative  Heel rise: Left: Not performed Right: Not performed  Too many toes sign: Left: Not performed Right: Not performed      General appearance: NAD  Peripheral pulses: normal bilaterally   Reflexes: Left: normal Right normal   Sensation: normal    Labs:  Last A1c: 5.4     Imaging:   Previous images reviewed.  X-rays ordered and performed today: yes  # of views: 3 Laterality: bilateral  My Interpretation:  Bilateral foot x-rays shows spurring at the insertion of the Achilles with the right foot worse than the left.      Assessment:        Encounter Diagnoses   Code Name Primary?    M76.61, M76.62 Achilles tendonitis, bilateral Yes    E66.01 Obesity, morbid, BMI 40.0-49.9         Plan:   MDM: Prior external referring provider notes reviewed. Prior external referring provider studies reviewed.   Dx:  Bilateral insertional Achilles tendonitis, new problem  Treatment Plan: Discussed with patient diagnosis and treatment recommendations. Handout given. Recommend conservative treatment to include: avoidance of aggravating activity, significant modification of daily activities, hot/cold  therapies, topical and oral medications, braces, HEP/PT/OT, and injections.   Patient with bilateral insertional Achilles tendonitis at this time with the left worse than the right.  We will start patient on oral topical medications for pain relief.  We will give patient a tall walking boot of the left foot since out on her more.  We will give a trial of nitroglycerin patches to try over the insertion of the Achilles.  Counseled patient to continue doing eccentric heel drop exercises with her symptoms.  We will see patient back in 4-6 weeks for re-evaluation.  Imaging: radiological studies ordered and independently reviewed; discussed with patient; agree with radiologist interpretation.   Activity: Activity as tolerated; HEP to include aerobic conditioning and strength training with non-painful activity. ROM/STG exercises. Proper footware; assistive devises to avoid limping.   Therapy: No formal therapy  Medication: START over-the-counter acetaminophen (Tylenol 1000 mg three times per day as needed)  START diclofenac 75mg twice a day  CONTINUE Voltaren Gel 1% as prescribed. Please see your primary care physician for further refills.  RTC: 6 weeks.           Pool Pat D.O.  Sports Medicine Fellow

## 2025-06-27 ENCOUNTER — HOSPITAL ENCOUNTER (OUTPATIENT)
Dept: RADIOLOGY | Facility: HOSPITAL | Age: 45
Discharge: HOME | End: 2025-06-27
Attending: STUDENT IN AN ORGANIZED HEALTH CARE EDUCATION/TRAINING PROGRAM
Payer: MEDICAID

## 2025-06-27 ENCOUNTER — RESULTS FOLLOW-UP (OUTPATIENT)
Dept: GASTROENTEROLOGY | Facility: CLINIC | Age: 45
End: 2025-06-27

## 2025-06-27 ENCOUNTER — OFFICE VISIT (OUTPATIENT)
Dept: ORTHOPEDICS | Facility: CLINIC | Age: 45
End: 2025-06-27
Payer: MEDICAID

## 2025-06-27 VITALS
BODY MASS INDEX: 45.99 KG/M2 | WEIGHT: 293 LBS | HEART RATE: 94 BPM | DIASTOLIC BLOOD PRESSURE: 80 MMHG | TEMPERATURE: 98 F | HEIGHT: 67 IN | SYSTOLIC BLOOD PRESSURE: 114 MMHG

## 2025-06-27 DIAGNOSIS — M76.61 ACHILLES TENDONITIS, BILATERAL: Primary | ICD-10-CM

## 2025-06-27 DIAGNOSIS — E66.01 OBESITY, MORBID, BMI 40.0-49.9: ICD-10-CM

## 2025-06-27 DIAGNOSIS — M79.671 RIGHT FOOT PAIN: ICD-10-CM

## 2025-06-27 DIAGNOSIS — M79.672 LEFT FOOT PAIN: ICD-10-CM

## 2025-06-27 DIAGNOSIS — M76.62 ACHILLES TENDONITIS, BILATERAL: Primary | ICD-10-CM

## 2025-06-27 PROCEDURE — 73630 X-RAY EXAM OF FOOT: CPT | Mod: TC,LT

## 2025-06-27 PROCEDURE — 73630 X-RAY EXAM OF FOOT: CPT | Mod: TC,RT

## 2025-06-27 PROCEDURE — 99214 OFFICE O/P EST MOD 30 MIN: CPT | Mod: PBBFAC,25 | Performed by: STUDENT IN AN ORGANIZED HEALTH CARE EDUCATION/TRAINING PROGRAM

## 2025-06-27 RX ORDER — DICLOFENAC SODIUM 75 MG/1
75 TABLET, DELAYED RELEASE ORAL 2 TIMES DAILY
Qty: 60 TABLET | Refills: 0 | Status: SHIPPED | OUTPATIENT
Start: 2025-06-27 | End: 2025-07-27

## 2025-06-27 RX ORDER — PANTOPRAZOLE SODIUM 40 MG/1
40 TABLET, DELAYED RELEASE ORAL 2 TIMES DAILY
Qty: 28 TABLET | Refills: 0 | Status: SHIPPED | OUTPATIENT
Start: 2025-06-27 | End: 2025-07-11

## 2025-06-27 RX ORDER — NAPROXEN 500 MG/1
500 TABLET ORAL EVERY 12 HOURS PRN
COMMUNITY
Start: 2025-04-13

## 2025-06-27 RX ORDER — NITROGLYCERIN 20 MG/1
1 PATCH TRANSDERMAL DAILY
Qty: 30 PATCH | Refills: 0 | Status: SHIPPED | OUTPATIENT
Start: 2025-06-27

## 2025-06-27 RX ORDER — GABAPENTIN 300 MG/1
1 CAPSULE ORAL
COMMUNITY

## 2025-06-27 RX ORDER — METRONIDAZOLE 500 MG/1
500 TABLET ORAL 4 TIMES DAILY
Qty: 56 TABLET | Refills: 0 | Status: SHIPPED | OUTPATIENT
Start: 2025-06-27 | End: 2025-07-29

## 2025-06-27 RX ORDER — TETRACYCLINE HYDROCHLORIDE 500 MG/1
500 CAPSULE ORAL 4 TIMES DAILY
Qty: 56 CAPSULE | Refills: 0 | Status: SHIPPED | OUTPATIENT
Start: 2025-06-27 | End: 2025-07-11

## 2025-06-27 NOTE — PROVATION PATIENT INSTRUCTIONS
Discharge Summary/Instructions after an Endoscopic Procedure  Patient Name: Abdi Combs  Patient MRN: [id number]  Patient YOB: 1980 Wednesday, June 25, 2025  Marylu Hector MD  Dear patient,  As a result of recent federal legislation (The Federal Cures Act), you may   receive lab or pathology results from your procedure in your [facility]   account before your physician is able to contact you. Your physician or   their representative will relay the results to you with their   recommendations at their soonest availability.  Thank you,  RESTRICTIONS:  During your procedure today, you received medications for sedation.  These   medications may affect your judgment, balance and coordination.  Therefore,   for 24 hours, you have the following restrictions:   - DO NOT drive a car, operate machinery, make legal/financial decisions,   sign important papers or drink alcohol.    ACTIVITY:  Today: no heavy lifting, straining or running due to procedural   sedation/anesthesia.  The following day: return to full activity including work.  DIET:  Eat and drink normally unless instructed otherwise.     TREATMENT FOR COMMON SIDE EFFECTS:  - Mild abdominal pain, nausea, belching, bloating or excessive gas:  rest,   eat lightly and use a heating pad.  - Sore Throat: treat with throat lozenges and/or gargle with warm salt   water.  - Because air was used during the procedure, expelling large amounts of air   from your rectum or belching is normal.  - If a bowel prep was taken, you may not have a bowel movement for 1-3 days.    This is normal.  SYMPTOMS TO WATCH FOR AND REPORT TO YOUR PHYSICIAN:  1. Abdominal pain or bloating, other than gas cramps.  2. Chest pain.  3. Back pain.  4. Signs of infection such as: chills or fever occurring within 24 hours   after the procedure.  5. Rectal bleeding, which would show as bright red, maroon, or black stools.   (A tablespoon of blood from the rectum is not serious, especially  if   hemorrhoids are present.)  6. Vomiting.  7. Weakness or dizziness.  GO DIRECTLY TO THE NEAREST EMERGENCY ROOM IF YOU HAVE ANY OF THE FOLLOWING:      Difficulty breathing              Chills and/or fever over 101 F   Persistent vomiting and/or vomiting blood   Severe abdominal pain   Severe chest pain   Black, tarry stools   Bleeding- more than one tablespoon   Any other symptom or condition that you feel may need urgent attention  Your doctor recommends these additional instructions:  If any biopsies were taken, your doctors clinic will contact you in 1 to 2   weeks with any results.  Recommendations:  - Discharge patient to home.   - Patient has a contact number available for emergencies.  The signs and   symptoms of potential delayed complications were discussed with the   patient.  Return to normal activities tomorrow.  Written discharge   instructions were provided to the patient.   - Resume previous diet.   - Continue present medications.  Impressions:  - Distal esophageal spasm likely secondary to GERD  For questions, problems or results please call your physician - Marylu Hector MD at Work:  [phone], Work:  [phone].  [facility] ED at [phone]  IF A COMPLICATION OR EMERGENCY SITUATION ARISES AND YOU ARE UNABLE TO REACH   YOUR PHYSICIAN - GO DIRECTLY TO THE EMERGENCY ROOM.  Marylu Hector MD  6/27/2025 5:06:59 PM  This report has been verified and signed electronically.  Dear patient,  As a result of recent federal legislation (The Federal Cures Act), you may   receive lab or pathology results from your procedure in your [facility]   account before your physician is able to contact you. Your physician or   their representative will relay the results to you with their   recommendations at their soonest availability.  Thank you,  PROVATION

## 2025-07-02 NOTE — PROGRESS NOTES
Faculty Attestation: Abdi Combs  was seen in Sports Medicine Clinic Discussed with Dr. Pat at the time of the visit. History of Present Illness, Physical Exam, and Assessment and Plan reviewed.     Treatment plan is reasonable and appropriate. Compliance with treatment recommendations is important.      Radiology images independently reviewed and agree with fellow interpretation.     No procedure was performed.    Mauro Wilson MD  Sports Medicine

## 2025-07-25 ENCOUNTER — OFFICE VISIT (OUTPATIENT)
Dept: ORTHOPEDICS | Facility: CLINIC | Age: 45
End: 2025-07-25
Payer: MEDICAID

## 2025-07-25 ENCOUNTER — LAB VISIT (OUTPATIENT)
Dept: LAB | Facility: HOSPITAL | Age: 45
End: 2025-07-25
Attending: STUDENT IN AN ORGANIZED HEALTH CARE EDUCATION/TRAINING PROGRAM
Payer: MEDICAID

## 2025-07-25 VITALS
BODY MASS INDEX: 45.99 KG/M2 | WEIGHT: 293 LBS | HEIGHT: 67 IN | HEART RATE: 83 BPM | TEMPERATURE: 98 F | DIASTOLIC BLOOD PRESSURE: 83 MMHG | OXYGEN SATURATION: 98 % | SYSTOLIC BLOOD PRESSURE: 123 MMHG

## 2025-07-25 DIAGNOSIS — E66.01 OBESITY, MORBID, BMI 40.0-49.9: ICD-10-CM

## 2025-07-25 DIAGNOSIS — M76.62 ACHILLES TENDONITIS, BILATERAL: ICD-10-CM

## 2025-07-25 DIAGNOSIS — M19.90 INFLAMMATORY ARTHRITIS: Primary | ICD-10-CM

## 2025-07-25 DIAGNOSIS — M19.90 INFLAMMATORY ARTHRITIS: ICD-10-CM

## 2025-07-25 DIAGNOSIS — M76.61 ACHILLES TENDONITIS, BILATERAL: ICD-10-CM

## 2025-07-25 LAB
ALBUMIN SERPL-MCNC: 3.6 G/DL (ref 3.5–5)
ALBUMIN/GLOB SERPL: 0.7 RATIO (ref 1.1–2)
ALP SERPL-CCNC: 76 UNIT/L (ref 40–150)
ALT SERPL-CCNC: 32 UNIT/L (ref 0–55)
ANION GAP SERPL CALC-SCNC: 10 MEQ/L
AST SERPL-CCNC: 22 UNIT/L (ref 11–45)
BASOPHILS # BLD AUTO: 0.03 X10(3)/MCL
BASOPHILS NFR BLD AUTO: 0.3 %
BILIRUB SERPL-MCNC: 0.3 MG/DL
BUN SERPL-MCNC: 16.8 MG/DL (ref 7–18.7)
CALCIUM SERPL-MCNC: 9.1 MG/DL (ref 8.4–10.2)
CHLORIDE SERPL-SCNC: 101 MMOL/L (ref 98–107)
CK SERPL-CCNC: 60 U/L (ref 29–168)
CO2 SERPL-SCNC: 29 MMOL/L (ref 22–29)
CREAT SERPL-MCNC: 0.98 MG/DL (ref 0.55–1.02)
CREAT/UREA NIT SERPL: 17
CRP SERPL-MCNC: 14.5 MG/L
EOSINOPHIL # BLD AUTO: 0.06 X10(3)/MCL (ref 0–0.9)
EOSINOPHIL NFR BLD AUTO: 0.7 %
ERYTHROCYTE [DISTWIDTH] IN BLOOD BY AUTOMATED COUNT: 15.6 % (ref 11.5–17)
ERYTHROCYTE [SEDIMENTATION RATE] IN BLOOD: 36 MM/HR (ref 0–15)
GFR SERPLBLD CREATININE-BSD FMLA CKD-EPI: >60 ML/MIN/1.73/M2
GLOBULIN SER-MCNC: 4.9 GM/DL (ref 2.4–3.5)
GLUCOSE SERPL-MCNC: 91 MG/DL (ref 74–100)
HCT VFR BLD AUTO: 38.7 % (ref 37–47)
HGB BLD-MCNC: 13.3 G/DL (ref 12–16)
IMM GRANULOCYTES # BLD AUTO: 0.03 X10(3)/MCL (ref 0–0.04)
IMM GRANULOCYTES NFR BLD AUTO: 0.3 %
LYMPHOCYTES # BLD AUTO: 2.33 X10(3)/MCL (ref 0.6–4.6)
LYMPHOCYTES NFR BLD AUTO: 26.5 %
MCH RBC QN AUTO: 26.7 PG (ref 27–31)
MCHC RBC AUTO-ENTMCNC: 34.4 G/DL (ref 33–36)
MCV RBC AUTO: 77.6 FL (ref 80–94)
MONOCYTES # BLD AUTO: 0.96 X10(3)/MCL (ref 0.1–1.3)
MONOCYTES NFR BLD AUTO: 10.9 %
NEUTROPHILS # BLD AUTO: 5.37 X10(3)/MCL (ref 2.1–9.2)
NEUTROPHILS NFR BLD AUTO: 61.3 %
NRBC BLD AUTO-RTO: 0 %
PLATELET # BLD AUTO: 309 X10(3)/MCL (ref 130–400)
PMV BLD AUTO: 10.1 FL (ref 7.4–10.4)
POTASSIUM SERPL-SCNC: 3.5 MMOL/L (ref 3.5–5.1)
PROT SERPL-MCNC: 8.5 GM/DL (ref 6.4–8.3)
RBC # BLD AUTO: 4.99 X10(6)/MCL (ref 4.2–5.4)
RHEUMATOID FACT SERPL-ACNC: <13 IU/ML
SODIUM SERPL-SCNC: 140 MMOL/L (ref 136–145)
WBC # BLD AUTO: 8.78 X10(3)/MCL (ref 4.5–11.5)

## 2025-07-25 PROCEDURE — 80053 COMPREHEN METABOLIC PANEL: CPT

## 2025-07-25 PROCEDURE — 86039 ANTINUCLEAR ANTIBODIES (ANA): CPT

## 2025-07-25 PROCEDURE — 86431 RHEUMATOID FACTOR QUANT: CPT

## 2025-07-25 PROCEDURE — 85025 COMPLETE CBC W/AUTO DIFF WBC: CPT

## 2025-07-25 PROCEDURE — 99215 OFFICE O/P EST HI 40 MIN: CPT | Mod: PBBFAC | Performed by: STUDENT IN AN ORGANIZED HEALTH CARE EDUCATION/TRAINING PROGRAM

## 2025-07-25 PROCEDURE — 86140 C-REACTIVE PROTEIN: CPT

## 2025-07-25 PROCEDURE — 86431 RHEUMATOID FACTOR QUANT: CPT | Mod: 59

## 2025-07-25 PROCEDURE — 36415 COLL VENOUS BLD VENIPUNCTURE: CPT

## 2025-07-25 PROCEDURE — 85652 RBC SED RATE AUTOMATED: CPT

## 2025-07-25 PROCEDURE — 86200 CCP ANTIBODY: CPT

## 2025-07-25 PROCEDURE — 82550 ASSAY OF CK (CPK): CPT

## 2025-07-25 RX ORDER — METHYLPREDNISOLONE 4 MG/1
TABLET ORAL
Qty: 1 EACH | Refills: 0 | Status: SHIPPED | OUTPATIENT
Start: 2025-07-25

## 2025-07-25 RX ORDER — ZOLPIDEM TARTRATE 10 MG/1
TABLET ORAL
COMMUNITY
Start: 2025-07-22 | End: 2025-11-19

## 2025-07-25 RX ORDER — DEXTROMETHORPHAN HYDROBROMIDE, BUPROPION HYDROCHLORIDE 105; 45 MG/1; MG/1
TABLET, MULTILAYER, EXTENDED RELEASE ORAL
COMMUNITY
Start: 2025-07-22

## 2025-07-25 NOTE — PROGRESS NOTES
"Subjective:    Patient ID: Abdi Combs is a 45 y.o. female  who presented to [facility] for follow up..      Chief Complaint: Pain of the Left Ankle and Pain of the Right Ankle      History of Present Illness:    Abdi Combs is a 45-year-old female who presents to clinic today for evaluation of bilateral insertional Achilles heel pain that has been going on for multiple months.  During her last visit she reports that her left heel was worse than her right.  We will place patient in a tall walking boot which initially did provide her with some relief.  Today she reports that her pain has returned bilaterally rating her pain as an 8/10 not improving with any oral topical medication.  He reports that the boot is no longer helping her with the symptoms.  She does report having a cousin with rheumatoid arthritis but has never been diagnosed with rheumatoid arthritis 4.  She has never had a neurological evaluation before.  She rates her pain as dull and achy with any standing or walking activities.  She denied any additional trauma since this time.  She denied any numbness tingling or radiation of symptoms down her legs.    Ankle/Foot Review of Systems:  Swelling?  yes  Instability?  no  Mechanical sx?  no  <30 min AM stiffness? no  Limited ROM? no  Fever/Chills? no    Comorbid Conditions  Morbid obesity       Objective:      Physical Exam:    /83 (Patient Position: Sitting)   Pulse 83   Temp 98.1 °F (36.7 °C)   Ht 5' 7" (1.702 m)   Wt 135.2 kg (298 lb)   SpO2 98%   BMI 46.67 kg/m²     Ortho/SPM Exam    Appearance:  limping  FWB  Soft tissue swelling: Left: no Right: no  Effusion: Left:  Negative Right: Negative  Erythema: Left no Right: no  Ecchymosis: Left: no Right: no  Atrophy: Left: no Right: no    Palpation:  Ankle/Foot Tenderness: Left: achilles insertion Right: achilles insertion.    Range of motion:  Ankle dorsiflexion (20 degrees):     " Left: 20 Right: 20  Ankle Plantar flexion (50 degrees): Left 50 Right: 50  Subtalar inversion (5 degrees): Left: 5 Right 5  Subtalar eversion (5 degrees):  Left: 5 Right 5  Transverse/midtarsal: adduction (20 degrees): Left: 20 Right: 20  Transverse/midtarsal abduction (10 degrees): Left: 10 Right: 10    Strength:  Foot inversion/dorsiflexion (Deep Peroneal N. L4):Left: 5/5  Pain: no Right: 5/5  Pain: no  1st toe Dorsiflexion (Deep Peroneal N. L5): Left: 5/5  Pain: no Right: 5/5  Pain: no  Foot plantarflexion (Tibial N. S1): Left: 5/5  Pain: no Right: 5/5  Pain: no  Foot eversion (superficial peroneal L4-S1): Left: 5/5  Pain: no Right: 5/5  Pain: no      Special Tests:  Hui:  Left: Not performed     Right: Not performed   Anterior drawer: Left: Negative     Right: Negative   Talar tilt: Left: Negative      Right: Negative   External rotation stress (Kleiger's): Left: Negative  Right: Negative  Eversion stress: Left: Negative Right: Negative   Squeeze: Left: Negative  Right: Negative      General appearance: NAD  Peripheral pulses: normal bilaterally   Reflexes: Left: normal Right normal   Sensation: normal    Labs:  Last A1c: 5.4     Imaging:   Previous images reviewed.  X-rays ordered and performed today: no      Assessment:        Encounter Diagnoses   Code Name Primary?    M19.90 Inflammatory arthritis Yes    M76.61, M76.62 Achilles tendonitis, bilateral     E66.01 Obesity, morbid, BMI 40.0-49.9         Plan:     Dx:  Bilateral insertional Achilles tendinitis, moderate exacerbation  Treatment Plan: Discussed with patient diagnosis and treatment recommendations. Handout given. Recommend conservative treatment to include: avoidance of aggravating activity, significant modification of daily activities, hot/cold therapies, topical and oral medications, braces, HEP/PT/OT, and injections.   Patient with insertional Achilles tendonitis not improving with stretching and booting at this time.  She has even tried  nitroglycerin strips without any improvement.  Patient has never been worked up for rhythm rheumatologist goal inflammatory arthritis.  We will obtain lab work at this time to further evaluate concern for rheumatological issues.  Counseled patient to continue using oral and topical medications for pain relief.  We will see patient back after the lab work has been completed.  Imaging: radiological studies ordered and independently reviewed; discussed with patient; agree with radiologist interpretation.   Weight Management: is paramount. recommend at least 10% of total body weight loss if your bmi is 30-34.9. A bmi 24.9 or less may provide further relief..   Procedure: Discussed CSI as treatment options; patient not a candidate for injections at this time.  Activity: Activity as tolerated; HEP to include aerobic conditioning and strength training with non-painful activity. ROM/STG exercises. Proper footware; assistive devises to avoid limping.   Therapy: Physical Therapy  Medication: CONTINUE over-the-counter acetaminophen (Tylenol 1000 mg three times per day as needed)  CONTINUE Voltaren Gel 1% as prescribed  CONTINUE over-the-counter NSAIDs (ibuprofen 200mg three tablets three times a day as needed). Please see your primary care physician for further refills.  RTC: PRN; call if any issues.               Pool Pat D.O.  Sports Medicine Fellow

## 2025-07-26 LAB — ANA SER QL HEP2 SUBST: NORMAL

## 2025-07-27 DIAGNOSIS — K21.9 GASTROESOPHAGEAL REFLUX DISEASE, UNSPECIFIED WHETHER ESOPHAGITIS PRESENT: Primary | ICD-10-CM

## 2025-07-28 LAB
CYCLIC CITRULLINATED PEPTIDE (CCP) (OHS): 2.5 U/ML
RHEUMATOID FACTOR IGA QUANTITATIVE (OLG): 6.1 IU/ML
RHEUMATOID FACTOR IGM QUANTITATIVE (OLG): 0.9 IU/ML

## 2025-07-29 ENCOUNTER — OFFICE VISIT (OUTPATIENT)
Dept: GYNECOLOGY | Facility: CLINIC | Age: 45
End: 2025-07-29
Payer: MEDICAID

## 2025-07-29 ENCOUNTER — TELEPHONE (OUTPATIENT)
Dept: ORTHOPEDICS | Facility: CLINIC | Age: 45
End: 2025-07-29
Payer: MEDICAID

## 2025-07-29 VITALS
SYSTOLIC BLOOD PRESSURE: 113 MMHG | HEIGHT: 67 IN | TEMPERATURE: 99 F | OXYGEN SATURATION: 100 % | HEART RATE: 93 BPM | RESPIRATION RATE: 20 BRPM | BODY MASS INDEX: 45.99 KG/M2 | WEIGHT: 293 LBS | DIASTOLIC BLOOD PRESSURE: 75 MMHG

## 2025-07-29 DIAGNOSIS — Z01.419 ENCOUNTER FOR ANNUAL ROUTINE GYNECOLOGICAL EXAMINATION: Primary | ICD-10-CM

## 2025-07-29 PROCEDURE — 99215 OFFICE O/P EST HI 40 MIN: CPT | Mod: PBBFAC | Performed by: NURSE PRACTITIONER

## 2025-07-29 PROCEDURE — 3008F BODY MASS INDEX DOCD: CPT | Mod: CPTII,,, | Performed by: NURSE PRACTITIONER

## 2025-07-29 PROCEDURE — 3044F HG A1C LEVEL LT 7.0%: CPT | Mod: CPTII,,, | Performed by: NURSE PRACTITIONER

## 2025-07-29 PROCEDURE — 3074F SYST BP LT 130 MM HG: CPT | Mod: CPTII,,, | Performed by: NURSE PRACTITIONER

## 2025-07-29 PROCEDURE — 99396 PREV VISIT EST AGE 40-64: CPT | Mod: S$PBB,,, | Performed by: NURSE PRACTITIONER

## 2025-07-29 PROCEDURE — 1159F MED LIST DOCD IN RCRD: CPT | Mod: CPTII,,, | Performed by: NURSE PRACTITIONER

## 2025-07-29 PROCEDURE — 3078F DIAST BP <80 MM HG: CPT | Mod: CPTII,,, | Performed by: NURSE PRACTITIONER

## 2025-07-29 RX ORDER — CLONAZEPAM 2 MG/1
TABLET ORAL
COMMUNITY
Start: 2025-05-06

## 2025-07-29 RX ORDER — DICLOFENAC SODIUM 75 MG/1
75 TABLET, DELAYED RELEASE ORAL 2 TIMES DAILY
Qty: 60 TABLET | Refills: 1 | Status: SHIPPED | OUTPATIENT
Start: 2025-07-29 | End: 2025-09-27

## 2025-07-29 NOTE — PROGRESS NOTES
"  [facility] -  Gynecology / Women's Health Clinic    Subjective:       Patient ID: Abdi Combs is a 45 y.o. female.    Chief Complaint:  Gynecologic Exam    History of Present Illness  The patient  here for annual exam. Pt is s/p TVH/BS/Cysto on 10/19/21 for AUB-P/L. Denies history of abnormal paps. MG-24 & BIRADS 1. Denies breast or urinary complaints. Denies pelvic pain, abnormal bleeding or discharge. Pt reports trichomonas in the past. Pt does admit to hot flashes that occur since hysterectomy, used estradiol patch in the past. Declines further treatment at this time. Denies tobacco use. Followed by Dr. Melgoza for behavioral health. IM provider for primary care. Denies fly hx of breast, ovarian, uterine or colon cancer.      GYN & OB History  No LMP recorded. Patient has had a hysterectomy.       Review of patient's allergies indicates:  No Known Allergies  Past Medical History:   Diagnosis Date    Acute pancreatitis     Anxiety     Cholelithiasis     Depression     GERD (gastroesophageal reflux disease)     Headache     Hepatic steatosis 3/11/2025    Hypertension      OB History    Para Term  AB Living   4 2   2    SAB IAB Ectopic Multiple Live Births   2          # Outcome Date GA Lbr Devan/2nd Weight Sex Type Anes PTL Lv   4 Para            3 Para            2 SAB            1 SAB                 Review of Systems  Review of Systems    Negative except for pertinent findings for positives per HPI     Objective:    Physical Exam    /75 (BP Location: Left arm, Patient Position: Sitting)   Pulse 93   Temp 98.6 °F (37 °C) (Oral)   Resp 20   Ht 5' 7" (1.702 m)   Wt (!) 136.4 kg (300 lb 12.8 oz)   SpO2 100%   BMI 47.11 kg/m²   GENERAL: Well-developed female in no acute distress.  SKIN: Normal to inspection,warm, dry and intact.  BREASTS: No rashes or erythema. No masses, lumps, discharge, tenderness.  VULVA: General appearance WNL; external genitalia " with no lesions or erythema.  BIMANUAL EXAM: Vaginal mucosa/vault pink, Vaginal cuff intact. Uterus/Cervix surgically absent. Daryn adnexa reveal no tenderness.  PSYCHIATRIC: Patient is oriented to person, place, and time. Mood and affect are normal.    Assessment:         ICD-10-CM ICD-9-CM   1. Encounter for annual routine gynecological examination  Z01.419 V72.31     Plan:   Abdi was seen today for gynecologic exam.    Diagnoses and all orders for this visit:    Encounter for annual routine gynecological examination    Pelvic today, pap deferred d/t hysterectomy  Call clinic with any vaginal bleeding which would be abnormal.  Follow up in about 1 year (around 7/29/2026) for annual exam.

## 2025-07-30 NOTE — PROGRESS NOTES
Faculty Attestation: Abdi Combs  was seen in Sports Medicine Clinic Discussed with Dr. Pat at the time of the visit. History of Present Illness, Physical Exam, and Assessment and Plan reviewed.     Treatment plan is reasonable and appropriate. Compliance with treatment recommendations is important.      No imaging studies were done today.     Procedure note reviewed. Patient tolerated procedure well.     Mauro Wilson MD  Sports Medicine

## 2025-08-04 RX ORDER — PANTOPRAZOLE SODIUM 40 MG/1
40 TABLET, DELAYED RELEASE ORAL
Qty: 90 TABLET | Refills: 1 | Status: SHIPPED | OUTPATIENT
Start: 2025-08-04

## 2025-08-13 ENCOUNTER — TELEPHONE (OUTPATIENT)
Dept: GASTROENTEROLOGY | Facility: CLINIC | Age: 45
End: 2025-08-13
Payer: MEDICAID

## 2025-08-13 ENCOUNTER — OFFICE VISIT (OUTPATIENT)
Dept: INTERNAL MEDICINE | Facility: CLINIC | Age: 45
End: 2025-08-13
Payer: MEDICAID

## 2025-08-13 VITALS
BODY MASS INDEX: 46.89 KG/M2 | HEART RATE: 86 BPM | RESPIRATION RATE: 20 BRPM | TEMPERATURE: 98 F | SYSTOLIC BLOOD PRESSURE: 113 MMHG | WEIGHT: 293 LBS | OXYGEN SATURATION: 98 % | DIASTOLIC BLOOD PRESSURE: 79 MMHG

## 2025-08-13 DIAGNOSIS — I10 PRIMARY HYPERTENSION: ICD-10-CM

## 2025-08-13 DIAGNOSIS — Z12.39 ENCOUNTER FOR SCREENING FOR MALIGNANT NEOPLASM OF BREAST, UNSPECIFIED SCREENING MODALITY: ICD-10-CM

## 2025-08-13 DIAGNOSIS — Z23 NEED FOR VACCINATION: Primary | ICD-10-CM

## 2025-08-13 PROCEDURE — 90471 IMMUNIZATION ADMIN: CPT | Mod: PBBFAC

## 2025-08-13 PROCEDURE — 99214 OFFICE O/P EST MOD 30 MIN: CPT | Mod: PBBFAC

## 2025-08-13 PROCEDURE — 90677 PCV20 VACCINE IM: CPT | Mod: PBBFAC

## 2025-08-13 RX ADMIN — PNEUMOCOCCAL 20-VALENT CONJUGATE VACCINE 0.5 ML
2.2; 2.2; 2.2; 2.2; 2.2; 2.2; 2.2; 2.2; 2.2; 2.2; 2.2; 2.2; 2.2; 2.2; 2.2; 2.2; 4.4; 2.2; 2.2; 2.2 INJECTION, SUSPENSION INTRAMUSCULAR at 08:08

## 2025-08-21 ENCOUNTER — TELEPHONE (OUTPATIENT)
Dept: NEUROLOGY | Facility: CLINIC | Age: 45
End: 2025-08-21
Payer: MEDICAID

## 2025-08-25 ENCOUNTER — OFFICE VISIT (OUTPATIENT)
Dept: NEUROLOGY | Facility: CLINIC | Age: 45
End: 2025-08-25
Payer: MEDICAID

## 2025-08-25 ENCOUNTER — PATIENT MESSAGE (OUTPATIENT)
Dept: NEUROLOGY | Facility: CLINIC | Age: 45
End: 2025-08-25

## 2025-08-25 DIAGNOSIS — E66.813 CLASS 3 SEVERE OBESITY DUE TO EXCESS CALORIES WITH SERIOUS COMORBIDITY AND BODY MASS INDEX (BMI) OF 45.0 TO 49.9 IN ADULT: Chronic | ICD-10-CM

## 2025-08-25 DIAGNOSIS — G47.00 INSOMNIA, UNSPECIFIED TYPE: Chronic | ICD-10-CM

## 2025-08-25 DIAGNOSIS — G43.011 MIGRAINE WITHOUT AURA, INTRACTABLE, WITH STATUS MIGRAINOSUS: Primary | Chronic | ICD-10-CM

## 2025-08-25 DIAGNOSIS — G43.009 MIGRAINE WITHOUT AURA AND RESPONSIVE TO TREATMENT: Chronic | ICD-10-CM

## 2025-08-25 DIAGNOSIS — G47.33 OSA ON CPAP: Chronic | ICD-10-CM

## 2025-08-25 PROCEDURE — 98005 SYNCH AUDIO-VIDEO EST LOW 20: CPT | Mod: 95,,, | Performed by: NURSE PRACTITIONER

## 2025-08-25 PROCEDURE — 3044F HG A1C LEVEL LT 7.0%: CPT | Mod: CPTII,95,, | Performed by: NURSE PRACTITIONER

## 2025-08-25 PROCEDURE — 1160F RVW MEDS BY RX/DR IN RCRD: CPT | Mod: CPTII,95,, | Performed by: NURSE PRACTITIONER

## 2025-08-25 PROCEDURE — 1159F MED LIST DOCD IN RCRD: CPT | Mod: CPTII,95,, | Performed by: NURSE PRACTITIONER

## 2025-08-25 RX ORDER — TETRACYCLINE HYDROCHLORIDE 500 MG/1
CAPSULE ORAL 4 TIMES DAILY
COMMUNITY
Start: 2025-08-13

## (undated) DEVICE — MOUTHPIECE ENDO 60FR

## (undated) DEVICE — KIT SURGICAL COLON .25 1.1OZ

## (undated) DEVICE — FORCEP ALLIGATOR 2.8MM W/NDL

## (undated) DEVICE — MANIFOLD 4 PORT

## (undated) DEVICE — SYR 50ML CATH TIP

## (undated) DEVICE — SOL IRR SOD CHL .9% POUR

## (undated) DEVICE — BLOCK BLOX BITE DENT RIM 54FR